# Patient Record
Sex: FEMALE | Race: WHITE | Employment: OTHER | URBAN - METROPOLITAN AREA
[De-identification: names, ages, dates, MRNs, and addresses within clinical notes are randomized per-mention and may not be internally consistent; named-entity substitution may affect disease eponyms.]

---

## 2018-10-14 ASSESSMENT — MIFFLIN-ST. JEOR
SCORE: 1066.95
SCORE: 1094.63

## 2018-10-15 ASSESSMENT — MIFFLIN-ST. JEOR: SCORE: 1114.63

## 2018-10-16 ASSESSMENT — MIFFLIN-ST. JEOR: SCORE: 1106.63

## 2018-10-17 ASSESSMENT — MIFFLIN-ST. JEOR: SCORE: 1136.63

## 2018-10-18 ENCOUNTER — ANESTHESIA - HEALTHEAST (OUTPATIENT)
Dept: SURGERY | Facility: CLINIC | Age: 73
End: 2018-10-18

## 2018-10-18 ASSESSMENT — MIFFLIN-ST. JEOR: SCORE: 1138.63

## 2018-10-19 ENCOUNTER — SURGERY - HEALTHEAST (OUTPATIENT)
Dept: SURGERY | Facility: CLINIC | Age: 73
End: 2018-10-19

## 2018-10-19 ASSESSMENT — MIFFLIN-ST. JEOR: SCORE: 1121.38

## 2018-10-20 ASSESSMENT — MIFFLIN-ST. JEOR: SCORE: 1128.18

## 2018-10-22 ENCOUNTER — COMMUNICATION - HEALTHEAST (OUTPATIENT)
Dept: NEUROSURGERY | Facility: CLINIC | Age: 73
End: 2018-10-22

## 2018-10-22 DIAGNOSIS — I61.9 HEMORRHAGIC STROKE (H): ICD-10-CM

## 2018-10-29 ENCOUNTER — COMMUNICATION - HEALTHEAST (OUTPATIENT)
Dept: NEUROSURGERY | Facility: CLINIC | Age: 73
End: 2018-10-29

## 2018-10-31 ENCOUNTER — OFFICE VISIT - HEALTHEAST (OUTPATIENT)
Dept: NEUROSURGERY | Facility: CLINIC | Age: 73
End: 2018-10-31

## 2018-10-31 ENCOUNTER — RECORDS - HEALTHEAST (OUTPATIENT)
Dept: LAB | Facility: CLINIC | Age: 73
End: 2018-10-31

## 2018-10-31 DIAGNOSIS — I62.9 INTRACRANIAL BLEED (H): ICD-10-CM

## 2018-10-31 LAB
ALBUMIN SERPL-MCNC: 2.8 G/DL (ref 3.5–5)
ALP SERPL-CCNC: 94 U/L (ref 45–120)
ALT SERPL W P-5'-P-CCNC: <9 U/L (ref 0–45)
ANION GAP SERPL CALCULATED.3IONS-SCNC: 12 MMOL/L (ref 5–18)
AST SERPL W P-5'-P-CCNC: 17 U/L (ref 0–40)
BILIRUB SERPL-MCNC: 0.5 MG/DL (ref 0–1)
BUN SERPL-MCNC: 9 MG/DL (ref 8–28)
CALCIUM SERPL-MCNC: 9.1 MG/DL (ref 8.5–10.5)
CHLORIDE BLD-SCNC: 102 MMOL/L (ref 98–107)
CO2 SERPL-SCNC: 24 MMOL/L (ref 22–31)
CREAT SERPL-MCNC: 0.67 MG/DL (ref 0.6–1.1)
DIGOXIN LEVEL LHE- HISTORICAL: 0.8 NG/ML (ref 0.5–2)
ERYTHROCYTE [DISTWIDTH] IN BLOOD BY AUTOMATED COUNT: 13.1 % (ref 11–14.5)
GFR SERPL CREATININE-BSD FRML MDRD: >60 ML/MIN/1.73M2
GLUCOSE BLD-MCNC: 89 MG/DL (ref 70–125)
HCT VFR BLD AUTO: 40 % (ref 35–47)
HGB BLD-MCNC: 12.8 G/DL (ref 12–16)
LEVETIRACETAM (KEPPRA): 24.2 UG/ML (ref 6–46)
MCH RBC QN AUTO: 32.1 PG (ref 27–34)
MCHC RBC AUTO-ENTMCNC: 32 G/DL (ref 32–36)
MCV RBC AUTO: 100 FL (ref 80–100)
PLATELET # BLD AUTO: 362 THOU/UL (ref 140–440)
PMV BLD AUTO: 9.7 FL (ref 8.5–12.5)
POTASSIUM BLD-SCNC: 4.1 MMOL/L (ref 3.5–5)
PROT SERPL-MCNC: 5.9 G/DL (ref 6–8)
RBC # BLD AUTO: 3.99 MILL/UL (ref 3.8–5.4)
SODIUM SERPL-SCNC: 138 MMOL/L (ref 136–145)
WBC: 9.1 THOU/UL (ref 4–11)

## 2018-10-31 ASSESSMENT — MIFFLIN-ST. JEOR: SCORE: 1121.38
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
## 2018-11-14 PROBLEM — S62.101A WRIST FRACTURE, RIGHT, CLOSED, INITIAL ENCOUNTER: Status: ACTIVE | Noted: 2018-10-16

## 2018-11-14 PROBLEM — I48.91 ATRIAL FIBRILLATION WITH RAPID VENTRICULAR RESPONSE (H): Status: ACTIVE | Noted: 2018-10-15

## 2018-11-14 PROBLEM — R41.4 HEMI-NEGLECT OF RIGHT SIDE: Status: ACTIVE | Noted: 2018-10-16

## 2018-11-14 PROBLEM — T79.6XXA TRAUMATIC RHABDOMYOLYSIS, INITIAL ENCOUNTER (H): Status: ACTIVE | Noted: 2018-10-15

## 2018-11-14 PROBLEM — L03.114 CELLULITIS OF LEFT FOREARM: Status: ACTIVE | Noted: 2018-10-18

## 2018-11-14 PROBLEM — R41.82 ALTERED MENTAL STATUS, UNSPECIFIED ALTERED MENTAL STATUS TYPE: Status: ACTIVE | Noted: 2018-10-16

## 2018-11-14 PROBLEM — S52.501A FRACTURE OF RIGHT DISTAL RADIUS: Status: ACTIVE | Noted: 2018-10-14

## 2018-11-14 PROBLEM — G93.5 COMPRESSION OF BRAIN (H): Status: ACTIVE | Noted: 2018-10-15

## 2018-11-20 ENCOUNTER — NURSING HOME VISIT (OUTPATIENT)
Dept: GERIATRICS | Facility: CLINIC | Age: 73
End: 2018-11-20
Payer: MEDICARE

## 2018-11-20 VITALS
DIASTOLIC BLOOD PRESSURE: 83 MMHG | SYSTOLIC BLOOD PRESSURE: 126 MMHG | OXYGEN SATURATION: 97 % | WEIGHT: 117 LBS | RESPIRATION RATE: 18 BRPM | HEART RATE: 68 BPM | TEMPERATURE: 97.2 F

## 2018-11-20 DIAGNOSIS — I63.422 CEREBROVASCULAR ACCIDENT (CVA) DUE TO EMBOLISM OF LEFT ANTERIOR CEREBRAL ARTERY (H): ICD-10-CM

## 2018-11-20 DIAGNOSIS — R29.6 FREQUENT FALLS: ICD-10-CM

## 2018-11-20 DIAGNOSIS — I62.9 INTRACRANIAL BLEED (H): Primary | ICD-10-CM

## 2018-11-20 DIAGNOSIS — I48.91 ATRIAL FIBRILLATION WITH RAPID VENTRICULAR RESPONSE (H): ICD-10-CM

## 2018-11-20 DIAGNOSIS — I65.23 CAROTID STENOSIS, BILATERAL: ICD-10-CM

## 2018-11-20 DIAGNOSIS — R41.82 ALTERED MENTAL STATUS, UNSPECIFIED ALTERED MENTAL STATUS TYPE: ICD-10-CM

## 2018-11-20 DIAGNOSIS — S62.101A WRIST FRACTURE, RIGHT, CLOSED, INITIAL ENCOUNTER: ICD-10-CM

## 2018-11-20 DIAGNOSIS — R41.89 COGNITIVE IMPAIRMENT: ICD-10-CM

## 2018-11-20 DIAGNOSIS — K92.2 GASTROINTESTINAL HEMORRHAGE, UNSPECIFIED GASTROINTESTINAL HEMORRHAGE TYPE: ICD-10-CM

## 2018-11-20 DIAGNOSIS — R41.4 HEMI-NEGLECT OF RIGHT SIDE: ICD-10-CM

## 2018-11-20 DIAGNOSIS — S52.501D CLOSED FRACTURE OF DISTAL END OF RIGHT RADIUS WITH ROUTINE HEALING, UNSPECIFIED FRACTURE MORPHOLOGY, SUBSEQUENT ENCOUNTER: ICD-10-CM

## 2018-11-20 DIAGNOSIS — R33.9 URINARY RETENTION WITH INCOMPLETE BLADDER EMPTYING: ICD-10-CM

## 2018-11-20 PROCEDURE — 99310 SBSQ NF CARE HIGH MDM 45: CPT | Performed by: NURSE PRACTITIONER

## 2018-11-20 RX ORDER — HYDROCODONE BITARTRATE AND ACETAMINOPHEN 7.5; 325 MG/1; MG/1
1-2 TABLET ORAL EVERY 4 HOURS PRN
COMMUNITY
End: 2019-01-02

## 2018-11-20 RX ORDER — HYDROCODONE BITARTRATE AND ACETAMINOPHEN 5; 325 MG/1; MG/1
1-2 TABLET ORAL EVERY 4 HOURS PRN
COMMUNITY
End: 2018-11-20

## 2018-11-20 NOTE — LETTER
11/20/2018        RE: Citlalli Villarreal  [address]  [facility] [address]        [facility]  PRIMARY CARE PROVIDER AND CLINIC:  No primary care provider on file. No primary physician on file.  Chief Complaint   Patient presents with     Hospital F/U     [facility] Medical Record Number:  [id number]  Place of Service where encounter took place:  [facility]  ([facility]) [[id number]]    HPI:    Citlalli Villarreal is a 72 year old  (1945),admitted to the above facility from  [facility].  Hospital stay 10/14/18 through 10/20/18, then [facility] TCU 10/20/18-10/26/18, and [facility] ED 10/26/18, then back to [facility] until her move to [facility] on 11/14/18.  Admitted to this facility for  rehab, medical management and nursing care.  HPI information obtained from: facility chart records, facility staff, patient report and [facility] chart review.      Citlalli Villarreal is a 73 yo patient who had not received medical care for several years, of unknown length.  She has been living in her own home, has 2 cats and 5 birds. She had been estranged from her spouse for many years (over 20 years?) and her children for over 15 years. Her significant other, who lived with her, was moved to a senior living option several years ago and she had been alone. Her home is reported to be in significant disrepair, dirty, and with cat feces and bird droppings throughout the home.  There is apparently an open vulnerable adult case regarding her situation.    She was admitted to the hospital on 10/14/18 after she did not answer her phone for at least 2 days, and was found down on the floor in her home, reporting that she hit her head on the table and the floor-trying to get up but was unable to do so. A R wrist deformity was noted, several bruises and low body temperature. She was found to have Afib with RVR, rhabdomyolysis, intracranial head bleed, + FOBT.    She was  found to have subacute large L occipital intraparenchymal hemorrhage with suspected ischemic infarct, possibly embolic in the setting of Afib. Also found to have severe high-grade narrowing of intracranial vertebral and basilar arteries, L PCA, L MCA, R MCA as well as L ICA stenosis 70-80%, R ICA stenosis 60-70%. She had R hemineglect, worsening dysarthria.  She was stabilized and transitioned to a TCU for ongoing care. At that TCU, she was found unresponsive on the floor and was seen at [facility] ED.    There, she was found to have urinary retention and CT scans of neck, head unremarkable for new issues, so she was returned to that TCU while awaiting placement at another facility.     Current issues are:         Intracranial bleed (H)  Cerebrovascular accident (CVA) due to embolism of left anterior cerebral artery (H)  Altered mental status, unspecified altered mental status type  Deny-neglect of right side  Atrial fibrillation with rapid ventricular response (H)  Carotid stenosis, bilateral  Frequent falls  Wrist fracture, right, closed, initial encounter  Closed fracture of distal end of right radius with routine healing, unspecified fracture morphology, subsequent encounter  Urinary retention with incomplete bladder emptying  Gastrointestinal hemorrhage, unspecified gastrointestinal hemorrhage type     Today, patient reports pain in the R chest wall without dyspnea, palpitations, or vital signs changes. The pain is un-reproducible and she reports it has been there 2-3 weeks.  She also reports significant feelings of dizziness leading to feeling as if she is going to fall.     CODE STATUS/ADVANCE DIRECTIVES DISCUSSION:   CPR/Full code   Patient's living condition: lives alone    ALLERGIES:Review of patient's allergies indicates no known allergies.  PAST MEDICAL HISTORY:  has a past medical history of Cerebral infarction (H) and Right wrist fracture (10/2018). She also has no past medical history of Thyroid  disease.  PAST SURGICAL HISTORY:  has no past surgical history on file.  FAMILY HISTORY: Family history is unknown by patient.  SOCIAL HISTORY:  unknown if smoking or alcohol use.     Post Discharge Medication Reconciliation Status: discharge medications reconciled and changed, per note/orders (see AVS).  Current Outpatient Prescriptions   Medication Sig Dispense Refill     ACETAMINOPHEN PO Take 650 mg by mouth 3 times daily And give 650 mg PO TID PRN for pain 1-3 out of 10       AMIODARONE HCL PO Take 400 mg by mouth daily Until 11/22 then decrease to 200 mg PO every day       ASPIRIN PO Take 81 mg by mouth daily       ATORVASTATIN CALCIUM PO Take 20 mg by mouth daily       DIGOXIN PO Take 125 mcg by mouth daily       DILTIAZEM HCL PO Take 240 mg by mouth daily       GABAPENTIN PO Take 100 mg by mouth At Bedtime       HYDROcodone-acetaminophen (NORCO) 7.5-325 MG per tablet Take 1-2 tablets by mouth every 4 hours as needed for severe pain Give 1 tab for pain 4-7 out of 10 and 2 tab for pain 8-10 out of 10       Multiple Vitamins-Minerals (MULTIVITAL-M PO) Take 1 tablet by mouth daily       OMEPRAZOLE PO Take 20 mg by mouth daily         ROS:  Limited secondary to cognitive impairment but today pt reports pain in chest wall, dizziness, weakness, difficulty sleeping.     Exam:  /83  Pulse 68  Temp 97.2  F (36.2  C)  Resp 18  Wt 117 lb (53.1 kg)  SpO2 97%  GENERAL APPEARANCE:  Alert, thin, appears ill, anxious  ENT:  Mouth and posterior oropharynx normal, moist mucous membranes  EYES:  EOM, conjunctivae, lids, pupils and irises normal  RESP:  respiratory effort and palpation of chest normal, lungs clear to auscultation , no respiratory distress  CV:  Palpation and auscultation of heart done , no edema, irregular rhythm irregularly irregular, grade systolic 2/6 murmur, No JVD, chest wall tender to palpation R mid chest at sternum  ABDOMEN:  normal bowel sounds, soft, nontender, no hepatosplenomegaly or other  masses  M/S:   Gait and station abnormal walking only with staff assist, transfers, high falls risk  SKIN:  Inspection of skin and subcutaneous tissue reveals no open areas, per nursing report  PSYCH:  oriented to month, town of [place], 4 children, vague recall of events    Lab/Diagnostic data:             ASSESSMENT/PLAN:  Intracranial bleed (H)  Cerebrovascular accident (CVA) due to embolism of left anterior cerebral artery (H)  Altered mental status, unspecified altered mental status type  Deny-neglect of right side  Found down on the floor after 2 days, with history of several falls.  Found to have suibactue L PCA infarct with hemorrhagic conversion, diffuse intracranial atherosclerosis.  Also has severe high-grade atherosclerotic narrowing of intracranial vertebral and basilar arteries, L PCA, L MCA, R MCA.  She completed a course of Keppra-EEG nonspecific, and is following with Dr. White, neurology at [facility].  Plan is for repeat CT on 10/21/18 and if resolution of hemorrhage is occurring to start aspirin (this was done).  Needs repeat CT head about 11/21/18 (scheduled for 11/28/18) and if compete resolution should start on Plavix.  However, if cardiology recommends anticoagulation (Eliquis or other), then discontinue aspirin and Plavix.   -neurology appointment with follow up CT next week  -if within normal limits, consider addition of Plavix  -recommend (per son Jose Armando's request) referral for ongoing follow up with neurology at [facility] - son will request transfer of care from Dr. White to [facility] at this follow up appointment.   -therapy to determine cognitive status.  -son, Jose Armando, working on guardianship paperwork    Atrial fibrillation with rapid ventricular response (H)  Found down with afib with RVR, on amiodarone taper-to 200 mg daily starting 11/23/18, digoxin 125 daily, and diltiazem 240 daily as well as aspirin, statin.  She has been lost to medical care for years. Heart rate still  irregularly irregular today with a faint murmur heard as well.  Echo 10/15/18 at [facility] shows normal LV size and function with EF 57%, moderate concentric hypertrophy noted, RV abnormal systolic function, mild aortic stenosis, pericardial effusion. Cardiology during hospital stay recommends Holter Monitor to determine extent of cardiac issues and next steps. If anticoagulation is recommended, then would re-evaluate need for aspirin and plavix for CVA.   -spoke with son, Jose Armando.  He would like all services moved to [place] due to it's proximity to [facility] and for ease of transport.    -CARDIOLOGY REFERRAL  -continue diltiazem, digoxin, amiodarone, and aspirin now  -check labs, including dig level  -orthostatic BP/P due to dizziness  -Full Code      Carotid stenosis, bilateral  Noted to have ICA stenosis L>R, 70-80%.  Will likely need vascular as well, may need evaluation for CEA.   -consider vascular referral    Frequent falls  Weak, dizziness likely contributing factor as well as R hemineglect.    -therapy   -close monitoring to prevent falls    Wrist fracture, right, closed, initial encounter  Closed fracture of distal end of right radius with routine healing, unspecified fracture morphology, subsequent encounter  Following with ortho, and reported healing is adequate. Has follow up with [facility] in the next 1-2 weeks, to determine next steps. Son would like to transition care to [facility] in [place], but needs to finish this course with [facility].   -son to transport for this follow up appointment.      Urinary retention with incomplete bladder emptying  Noted  To have urinary retention, previously had macedo cath.  No PVR while at [facility], but will continue to monitor.     Gastrointestinal hemorrhage, unspecified gastrointestinal hemorrhage type  Noted to have + FOBT while acutely ill, needs follow up with either repeat FOBT or colonoscopy as outpatient.   -check hemoglobin  -remain on PPI for now.     She  is having some R sided chest wall pain, points specifically to R medial sternal area 4th or 5th intercostal space and is somewhat tender to touch.  This is not correlated with increased dyspnea, palpitations, or vital signs changes. Relieved with Norco. May be related to muscle strain/bruising during frequent falls, does not seem to be cardiac in nature but this cannot be ruled out in this setting.   -schedule tylenol, monitor.     Cognitive  Impairment  OT and ST involved for cognitive testing, will also have in-house psych evaluation.  Family is seeking guardianship due to vulnerability of patient and lack of medical care over last many years, so will need documentation to show cognitive impairment.  -OT, ST to complete cognitive testing    Immunizations  Reviewed in [facility].  See orders. Received influenza shot 10/2/18 per [facility].        Orders:  1. Orthostatic BP and Pulse x3 at varying times over next 7 days and Record (lying, Sitting, Standing) Dx: Dizzyness  2. Ongoing PVR every shift. PVR every shift and Straight cath if >350mL Dx: Urinary Retention  3. discontinue tylenol 1000 mg Q4 PRN  4. Tylenol 650 mg TID and 650 mg PO  TID PRN Dx: Pain of 1-3 out of 10  5. Max daily dose of tylenol 4000 mg daily   6. Clarify Norco 7.5/325 1-2 tab for pain 4-7 out of 10 and 2 tabs for pain 8-10 out of 10   7. Ok for in house psych to evaluate and teat D: Depression  8. Please call cardiology to schedule consult - Tyrell lainez ([phone])  9. Administer T-dap Immunization x1 does Dx: Health Maintenance  10. Administer Prevnar 13 Vaccine IM x1 dose Dx: Health Maintenance   11. Lab Draw 11/26/18 Dx: Comprehensive Metabolic Panel, CBC, and digoxin Dx: HTN, anemia, A-fib      Total time spent with patient visit at the skilled nursing facility was 75 minutes including patient visit, review of past records and phone call to patient contact. Greater than 50% of total time spent with counseling and coordinating care  due to complex medical comorbid conditions   Time in:  9:15 am  Time out:  10:30 am     Electronically signed by:  JADA Us CNP      Sincerely,        JADA Us CNP

## 2018-11-20 NOTE — PROGRESS NOTES
[place] [facility]  PRIMARY CARE PROVIDER AND CLINIC:  No primary care provider on file. No primary physician on file.  Chief Complaint   Patient presents with     Hospital F/U     [place] Medical Record Number:  [id number]  Place of Service where encounter took place:  [facility]  ([facility]) [[id number]]    HPI:    Citlalli Villarreal is a 72 year old  (1945),admitted to the above facility from  [facility].  Hospital stay 10/14/18 through 10/20/18, then [facility] TCU 10/20/18-10/26/18, and [facility] ED 10/26/18, then back to [facility] until her move to [facility] on 11/14/18.  Admitted to this facility for  rehab, medical management and nursing care.  HPI information obtained from: facility chart records, facility staff, patient report and [facility] chart review.      Citlalli Villarreal is a 71 yo patient who had not received medical care for several years, of unknown length.  She has been living in her own home, has 2 cats and 5 birds. She had been estranged from her spouse for many years (over 20 years?) and her children for over 15 years. Her significant other, who lived with her, was moved to a senior living option several years ago and she had been alone. Her home is reported to be in significant disrepair, dirty, and with cat feces and bird droppings throughout the home.  There is apparently an open vulnerable adult case regarding her situation.    She was admitted to the hospital on 10/14/18 after she did not answer her phone for at least 2 days, and was found down on the floor in her home, reporting that she hit her head on the table and the floor-trying to get up but was unable to do so. A R wrist deformity was noted, several bruises and low body temperature. She was found to have Afib with RVR, rhabdomyolysis, intracranial head bleed, + FOBT.    She was found to have subacute large L occipital intraparenchymal hemorrhage with suspected ischemic  infarct, possibly embolic in the setting of Afib. Also found to have severe high-grade narrowing of intracranial vertebral and basilar arteries, L PCA, L MCA, R MCA as well as L ICA stenosis 70-80%, R ICA stenosis 60-70%. She had R hemineglect, worsening dysarthria.  She was stabilized and transitioned to a TCU for ongoing care. At that TCU, she was found unresponsive on the floor and was seen at [facility] ED.    There, she was found to have urinary retention and CT scans of neck, head unremarkable for new issues, so she was returned to that TCU while awaiting placement at another facility.     Current issues are:         Intracranial bleed (H)  Cerebrovascular accident (CVA) due to embolism of left anterior cerebral artery (H)  Altered mental status, unspecified altered mental status type  Deny-neglect of right side  Atrial fibrillation with rapid ventricular response (H)  Carotid stenosis, bilateral  Frequent falls  Wrist fracture, right, closed, initial encounter  Closed fracture of distal end of right radius with routine healing, unspecified fracture morphology, subsequent encounter  Urinary retention with incomplete bladder emptying  Gastrointestinal hemorrhage, unspecified gastrointestinal hemorrhage type     Today, patient reports pain in the R chest wall without dyspnea, palpitations, or vital signs changes. The pain is un-reproducible and she reports it has been there 2-3 weeks.  She also reports significant feelings of dizziness leading to feeling as if she is going to fall.     CODE STATUS/ADVANCE DIRECTIVES DISCUSSION:   CPR/Full code   Patient's living condition: lives alone    ALLERGIES:Review of patient's allergies indicates no known allergies.  PAST MEDICAL HISTORY:  has a past medical history of Cerebral infarction (H) and Right wrist fracture (10/2018). She also has no past medical history of Thyroid disease.  PAST SURGICAL HISTORY:  has no past surgical history on file.  FAMILY HISTORY: Family  history is unknown by patient.  SOCIAL HISTORY:  unknown if smoking or alcohol use.     Post Discharge Medication Reconciliation Status: discharge medications reconciled and changed, per note/orders (see AVS).  Current Outpatient Prescriptions   Medication Sig Dispense Refill     ACETAMINOPHEN PO Take 650 mg by mouth 3 times daily And give 650 mg PO TID PRN for pain 1-3 out of 10       AMIODARONE HCL PO Take 400 mg by mouth daily Until 11/22 then decrease to 200 mg PO every day       ASPIRIN PO Take 81 mg by mouth daily       ATORVASTATIN CALCIUM PO Take 20 mg by mouth daily       DIGOXIN PO Take 125 mcg by mouth daily       DILTIAZEM HCL PO Take 240 mg by mouth daily       GABAPENTIN PO Take 100 mg by mouth At Bedtime       HYDROcodone-acetaminophen (NORCO) 7.5-325 MG per tablet Take 1-2 tablets by mouth every 4 hours as needed for severe pain Give 1 tab for pain 4-7 out of 10 and 2 tab for pain 8-10 out of 10       Multiple Vitamins-Minerals (MULTIVITAL-M PO) Take 1 tablet by mouth daily       OMEPRAZOLE PO Take 20 mg by mouth daily         ROS:  Limited secondary to cognitive impairment but today pt reports pain in chest wall, dizziness, weakness, difficulty sleeping.     Exam:  /83  Pulse 68  Temp 97.2  F (36.2  C)  Resp 18  Wt 117 lb (53.1 kg)  SpO2 97%  GENERAL APPEARANCE:  Alert, thin, appears ill, anxious  ENT:  Mouth and posterior oropharynx normal, moist mucous membranes  EYES:  EOM, conjunctivae, lids, pupils and irises normal  RESP:  respiratory effort and palpation of chest normal, lungs clear to auscultation , no respiratory distress  CV:  Palpation and auscultation of heart done , no edema, irregular rhythm irregularly irregular, grade systolic 2/6 murmur, No JVD, chest wall tender to palpation R mid chest at sternum  ABDOMEN:  normal bowel sounds, soft, nontender, no hepatosplenomegaly or other masses  M/S:   Gait and station abnormal walking only with staff assist, transfers, high falls  risk  SKIN:  Inspection of skin and subcutaneous tissue reveals no open areas, per nursing report  PSYCH:  oriented to month, town of [place], 4 children, vague recall of events    Lab/Diagnostic data:             ASSESSMENT/PLAN:  Intracranial bleed (H)  Cerebrovascular accident (CVA) due to embolism of left anterior cerebral artery (H)  Altered mental status, unspecified altered mental status type  Deny-neglect of right side  Found down on the floor after 2 days, with history of several falls.  Found to have suibactue L PCA infarct with hemorrhagic conversion, diffuse intracranial atherosclerosis.  Also has severe high-grade atherosclerotic narrowing of intracranial vertebral and basilar arteries, L PCA, L MCA, R MCA.  She completed a course of Keppra-EEG nonspecific, and is following with Dr. White, neurology at [facility].  Plan is for repeat CT on 10/21/18 and if resolution of hemorrhage is occurring to start aspirin (this was done).  Needs repeat CT head about 11/21/18 (scheduled for 11/28/18) and if compete resolution should start on Plavix.  However, if cardiology recommends anticoagulation (Eliquis or other), then discontinue aspirin and Plavix.   -neurology appointment with follow up CT next week  -if within normal limits, consider addition of Plavix  -recommend (per son Jose Armando's request) referral for ongoing follow up with neurology at [facility] - son will request transfer of care from Dr. White to [facility] at this follow up appointment.   -therapy to determine cognitive status.  -son, Jose Armando, working on guardianship paperwork    Atrial fibrillation with rapid ventricular response (H)  Found down with afib with RVR, on amiodarone taper-to 200 mg daily starting 11/23/18, digoxin 125 daily, and diltiazem 240 daily as well as aspirin, statin.  She has been lost to medical care for years. Heart rate still irregularly irregular today with a faint murmur heard as well.  Echo 10/15/18 at [facility]  shows normal LV size and function with EF 57%, moderate concentric hypertrophy noted, RV abnormal systolic function, mild aortic stenosis, pericardial effusion. Cardiology during hospital stay recommends Holter Monitor to determine extent of cardiac issues and next steps. If anticoagulation is recommended, then would re-evaluate need for aspirin and plavix for CVA.   -spoke with son, Jose Armando.  He would like all services moved to [place] due to it's proximity to [facility] and for ease of transport.    -CARDIOLOGY REFERRAL  -continue diltiazem, digoxin, amiodarone, and aspirin now  -check labs, including dig level  -orthostatic BP/P due to dizziness  -Full Code      Carotid stenosis, bilateral  Noted to have ICA stenosis L>R, 70-80%.  Will likely need vascular as well, may need evaluation for CEA.   -consider vascular referral    Frequent falls  Weak, dizziness likely contributing factor as well as R hemineglect.    -therapy   -close monitoring to prevent falls    Wrist fracture, right, closed, initial encounter  Closed fracture of distal end of right radius with routine healing, unspecified fracture morphology, subsequent encounter  Following with ortho, and reported healing is adequate. Has follow up with [facility] in the next 1-2 weeks, to determine next steps. Son would like to transition care to [facility] in [place], but needs to finish this course with [facility].   -son to transport for this follow up appointment.      Urinary retention with incomplete bladder emptying  Noted  To have urinary retention, previously had macedo cath.  No PVR while at [place], but will continue to monitor.     Gastrointestinal hemorrhage, unspecified gastrointestinal hemorrhage type  Noted to have + FOBT while acutely ill, needs follow up with either repeat FOBT or colonoscopy as outpatient.   -check hemoglobin  -remain on PPI for now.     She is having some R sided chest wall pain, points specifically to R medial sternal area 4th or  5th intercostal space and is somewhat tender to touch.  This is not correlated with increased dyspnea, palpitations, or vital signs changes. Relieved with Norco. May be related to muscle strain/bruising during frequent falls, does not seem to be cardiac in nature but this cannot be ruled out in this setting.   -schedule tylenol, monitor.     Cognitive  Impairment  OT and ST involved for cognitive testing, will also have in-house psych evaluation.  Family is seeking guardianship due to vulnerability of patient and lack of medical care over last many years, so will need documentation to show cognitive impairment.  -OT, ST to complete cognitive testing    Immunizations  Reviewed in [facility], [facility].  See orders. Received influenza shot 10/2/18 per [facility].        Orders:  1. Orthostatic BP and Pulse x3 at varying times over next 7 days and Record (lying, Sitting, Standing) Dx: Dizzyness  2. Ongoing PVR every shift. PVR every shift and Straight cath if >350mL Dx: Urinary Retention  3. discontinue tylenol 1000 mg Q4 PRN  4. Tylenol 650 mg TID and 650 mg PO  TID PRN Dx: Pain of 1-3 out of 10  5. Max daily dose of tylenol 4000 mg daily   6. Clarify Norco 7.5/325 1-2 tab for pain 4-7 out of 10 and 2 tabs for pain 8-10 out of 10   7. Ok for in house psych to evaluate and teat D: Depression  8. Please call cardiology to schedule consult - Tyrell lainez ([phone])  9. Administer T-dap Immunization x1 does Dx: Health Maintenance  10. Administer Prevnar 13 Vaccine IM x1 dose Dx: Health Maintenance   11. Lab Draw 11/26/18 Dx: Comprehensive Metabolic Panel, CBC, and digoxin Dx: HTN, anemia, A-fib      Total time spent with patient visit at the skilled nursing facility was 75 minutes including patient visit, review of past records and phone call to patient contact. Greater than 50% of total time spent with counseling and coordinating care due to complex medical comorbid conditions   Time in:  9:15 am  Time out:  10:30  am     Electronically signed by:  JADA Us CNP

## 2018-11-21 PROBLEM — K92.2 GASTROINTESTINAL HEMORRHAGE, UNSPECIFIED GASTROINTESTINAL HEMORRHAGE TYPE: Status: ACTIVE | Noted: 2018-11-21

## 2018-11-26 ENCOUNTER — TRANSFERRED RECORDS (OUTPATIENT)
Dept: HEALTH INFORMATION MANAGEMENT | Facility: CLINIC | Age: 73
End: 2018-11-26

## 2018-11-26 ENCOUNTER — RECORDS - HEALTHEAST (OUTPATIENT)
Dept: LAB | Facility: CLINIC | Age: 73
End: 2018-11-26

## 2018-11-26 LAB
ALBUMIN SERPL-MCNC: 3 G/DL (ref 3.5–5)
ALBUMIN SERPL-MCNC: 3 G/DL (ref 3.5–5)
ALP SERPL-CCNC: 105 U/L (ref 45–120)
ALP SERPL-CCNC: 105 U/L (ref 45–120)
ALT SERPL W P-5'-P-CCNC: 9 U/L (ref 0–45)
ALT SERPL-CCNC: 9 U/L (ref 0–45)
ANION GAP SERPL CALCULATED.3IONS-SCNC: 7 MMOL/L (ref 5–18)
ANION GAP SERPL CALCULATED.3IONS-SCNC: 7 MMOL/L (ref 5–18)
AST SERPL W P-5'-P-CCNC: 13 U/L (ref 0–40)
AST SERPL-CCNC: 13 U/L (ref 0–40)
BILIRUB SERPL-MCNC: 0.3 MG/DL (ref 0–1)
BILIRUB SERPL-MCNC: 0.3 MG/DL (ref 0–1)
BUN SERPL-MCNC: 16 MG/DL (ref 8–28)
BUN SERPL-MCNC: 16 MG/DL (ref 8–28)
CALCIUM SERPL-MCNC: 9.3 MG/DL (ref 8.5–10.5)
CALCIUM SERPL-MCNC: 9.3 MG/DL (ref 8.5–10.5)
CHLORIDE BLD-SCNC: 105 MMOL/L (ref 98–107)
CHLORIDE SERPLBLD-SCNC: 105 MMOL/L (ref 98–107)
CO2 SERPL-SCNC: 29 MMOL/L (ref 22–31)
CO2 SERPL-SCNC: 29 MMOL/L (ref 22–31)
CREAT SERPL-MCNC: 0.83 MG/DL (ref 0.6–1.1)
CREAT SERPL-MCNC: 0.83 MG/DL (ref 0.6–1.1)
DIGOXIN LEVEL LHE- HISTORICAL: 1 NG/ML (ref 0.5–2)
ERYTHROCYTE [DISTWIDTH] IN BLOOD BY AUTOMATED COUNT: 13.7 % (ref 11–14.5)
ERYTHROCYTE [DISTWIDTH] IN BLOOD BY AUTOMATED COUNT: 13.7 % (ref 11–14.5)
GFR SERPL CREATININE-BSD FRML MDRD: >60 ML/MIN/1.73M2
GFR SERPL CREATININE-BSD FRML MDRD: >60 ML/MIN/1.73M2
GLUCOSE BLD-MCNC: 96 MG/DL (ref 70–125)
GLUCOSE SERPL-MCNC: 98 MG/DL (ref 70–125)
HCT VFR BLD AUTO: 42.9 % (ref 35–47)
HCT VFR BLD AUTO: 42.9 % (ref 35–47)
HEMOGLOBIN: 13.4 G/DL (ref 12–16)
HGB BLD-MCNC: 13.4 G/DL (ref 12–16)
MCH RBC QN AUTO: 31.2 PG (ref 27–34)
MCH RBC QN AUTO: 31.2 PG (ref 27–34)
MCHC RBC AUTO-ENTMCNC: 31.2 G/DL (ref 32–36)
MCHC RBC AUTO-ENTMCNC: 31.2 G/DL (ref 32–36)
MCV RBC AUTO: 100 FL (ref 80–100)
MCV RBC AUTO: 100 FL (ref 80–100)
PLATELET # BLD AUTO: 293 THOU/UL (ref 140–440)
PLATELET # BLD AUTO: 293 THOU/UL (ref 140–440)
PMV BLD AUTO: 10.1 FL (ref 8.5–12.5)
POTASSIUM BLD-SCNC: 4.8 MMOL/L (ref 3.5–5)
POTASSIUM SERPL-SCNC: 4.8 MMOL/L (ref 3.5–5)
PROT SERPL-MCNC: 6.3 G/DL (ref 6–8)
PROT SERPL-MCNC: 6.3 G/DL (ref 6–8)
RBC # BLD AUTO: 4.29 MILL/UL (ref 3.8–5.4)
RBC # BLD AUTO: 4.29 MILL/UL (ref 3.8–5.4)
SODIUM SERPL-SCNC: 141 MMOL/L (ref 136–145)
SODIUM SERPL-SCNC: 141 MMOL/L (ref 136–145)
WBC # BLD AUTO: 9.7 THOU/UL (ref 4–11)
WBC: 9.7 THOU/UL (ref 4–11)

## 2018-11-27 ENCOUNTER — COMMUNICATION - HEALTHEAST (OUTPATIENT)
Dept: NEUROSURGERY | Facility: CLINIC | Age: 73
End: 2018-11-27

## 2018-11-28 ENCOUNTER — NURSING HOME VISIT (OUTPATIENT)
Dept: GERIATRICS | Facility: CLINIC | Age: 73
End: 2018-11-28
Payer: MEDICARE

## 2018-11-28 VITALS
WEIGHT: 117 LBS | RESPIRATION RATE: 18 BRPM | DIASTOLIC BLOOD PRESSURE: 75 MMHG | SYSTOLIC BLOOD PRESSURE: 123 MMHG | TEMPERATURE: 98.3 F | OXYGEN SATURATION: 97 % | HEART RATE: 61 BPM

## 2018-11-28 DIAGNOSIS — S52.501D CLOSED FRACTURE OF DISTAL END OF RIGHT RADIUS WITH ROUTINE HEALING, UNSPECIFIED FRACTURE MORPHOLOGY, SUBSEQUENT ENCOUNTER: ICD-10-CM

## 2018-11-28 DIAGNOSIS — I63.422 CEREBROVASCULAR ACCIDENT (CVA) DUE TO EMBOLISM OF LEFT ANTERIOR CEREBRAL ARTERY (H): Primary | ICD-10-CM

## 2018-11-28 DIAGNOSIS — K92.2 GASTROINTESTINAL HEMORRHAGE, UNSPECIFIED GASTROINTESTINAL HEMORRHAGE TYPE: ICD-10-CM

## 2018-11-28 DIAGNOSIS — I48.91 ATRIAL FIBRILLATION WITH RAPID VENTRICULAR RESPONSE (H): ICD-10-CM

## 2018-11-28 DIAGNOSIS — R41.4 HEMI-NEGLECT OF RIGHT SIDE: ICD-10-CM

## 2018-11-28 DIAGNOSIS — I62.9 INTRACRANIAL BLEED (H): ICD-10-CM

## 2018-11-28 DIAGNOSIS — R41.89 COGNITIVE IMPAIRMENT: ICD-10-CM

## 2018-11-28 DIAGNOSIS — R33.9 URINARY RETENTION WITH INCOMPLETE BLADDER EMPTYING: ICD-10-CM

## 2018-11-28 PROCEDURE — 99309 SBSQ NF CARE MODERATE MDM 30: CPT | Performed by: NURSE PRACTITIONER

## 2018-11-28 NOTE — LETTER
11/28/2018        RE: Citlalli Villarreal  [address]  [place] [address]        [place] [facility]    Chief Complaint   Patient presents with     half-way Acute       [place] Medical Record Number:  [id number]  Place of Service where encounter took place:  [facility]  ([facility]) [[id number]]    HPI:    Citlalli Villarreal is a 72 year old  (1945), who is being seen today for an episodic care visit.  HPI information obtained from: facility chart records, facility staff, patient report and [facility] chart review.    Today's concern is:     Cerebrovascular accident (CVA) due to embolism of left anterior cerebral artery (H)  Intracranial bleed (H)  Deny-neglect of right side  Closed fracture of distal end of right radius with routine healing, unspecified fracture morphology, subsequent encounter  Atrial fibrillation with rapid ventricular response (H)  Gastrointestinal hemorrhage, unspecified gastrointestinal hemorrhage type  Urinary retention with incomplete bladder emptying  Cognitive impairment     Patient reports occasional pain in L arm, no chest pain, no cough, no congestion. She reports no dysuria.  She is angry and swearing throughout conversation, spitting and sneering.  She refuses to answer questions, rolls away (while sitting in wheelchair).     Therapy staff report the following scores on cognitive testing:  SLUMS 11/30  MOCA 11/30  CPT-refuses to complete the test, on partial completion scoring 3,3,4 on units  (perfect score 6 per unit)  Clock Drawing Test 5/67 - this is done with non-dominant hand so likely less accurate  CLQT 105/183 on memory portion and 24/37 on language portion--this indicates moderate cognitive impairment     Son, Jose Armando, is planning to try to obtain guardianship so has papers that need to be signed by physician for court.  These will be given to Dr. Johnson on Monday when he rounds.     ALLERGIES: Review of patient's allergies indicates no known  allergies.  Past Medical, Surgical, Family and Social History reviewed and updated in [facility].    Current Outpatient Prescriptions   Medication Sig Dispense Refill     ACETAMINOPHEN PO Take 650 mg by mouth 3 times daily And give 650 mg PO TID PRN for pain 1-3 out of 10       AMIODARONE HCL PO Take 200 mg by mouth daily        ASPIRIN PO Take 81 mg by mouth daily       ATORVASTATIN CALCIUM PO Take 20 mg by mouth daily       DIGOXIN PO Take 125 mcg by mouth daily       DILTIAZEM HCL PO Take 240 mg by mouth daily       GABAPENTIN PO Take 100 mg by mouth At Bedtime       HYDROcodone-acetaminophen (NORCO) 7.5-325 MG per tablet Take 1-2 tablets by mouth every 4 hours as needed for severe pain Give 1 tab for pain 4-7 out of 10 and 2 tab for pain 8-10 out of 10       Multiple Vitamins-Minerals (MULTIVITAL-M PO) Take 1 tablet by mouth daily       OMEPRAZOLE PO Take 20 mg by mouth daily       Medications reviewed:  Medications reconciled to facility chart and changes were made to reflect current medications as identified as above med list. Below are the changes that were made:   Medications stopped since last EPIC medication reconciliation:   There are no discontinued medications.    Medications started since last [facility] medication reconciliation:  No orders of the defined types were placed in this encounter.    REVIEW OF SYSTEMS:  Limited secondary to cognitive impairment but today pt reports pain in L shoulder, no other concerns and is not cooperative to answer questions.     Physical Exam:  /75  Pulse 61  Temp 98.3  F (36.8  C)  Resp 18  Wt 117 lb (53.1 kg)  SpO2 97%  GENERAL APPEARANCE:  Alert, thin, anxious, uncooperative  RESP:  respiratory effort and palpation of chest normal, lungs clear to auscultation , no respiratory distress  CV:  Palpation and auscultation of heart done , no edema, irregular rhythm with occ missed beats, extra beats-irregular pattern, rate-normal  M/S:   Gait and station abnormal ambulating  with therapy staff, unsteady on her feet  SKIN:  Inspection of skin and subcutaneous tissue reveals no open areas, dry skin of feet, dystrophic nails  PSYCH:  oriented to self with forgetfulness, insight and judgement impaired, cognitive testing as noted above, affect abnormal suspicious, belligerent, anxious, thought content scattered    Recent Labs:   11/26/2018   Hgb 13.4  11/26/18  Na 141, K 4.8, Creat 0.83  Dig 1.0  Albumin 3.0    Assessment/Plan:  Cerebrovascular accident (CVA) due to embolism of left anterior cerebral artery (H)  Intracranial bleed (H)  Deny-neglect of right side  Continues with mild right sided weakness, but is improving with therapy. She is not always compliant with therapy - sometimes angry and refusing.  She is improving from a physical standpoint. On aspirin. Per recommendation of neurology, needs follow up CT scan (currently scheduled for 12/13/18) then addition of plavix if ICH has resolved-unless cardiology recommends OAC for afib.     Closed fracture of distal end of right radius with routine healing, unspecified fracture morphology, subsequent encounter  Has removable wrist splint on R wrist. Underlying skin dry and flaking with previous incision CDI and well healed without open areas, no pain.  Has follow up appointment with ortho this week on  11/30/18.     Atrial fibrillation with rapid ventricular response (H)  Remains on aspirin, amiodarone, diltiazem, digoxin.  Most recent dig level within normal limits. Need follow up with cardiology and family requested referral to [employer] [facility] [employer] [facility], appointment not yet made.  Referral in EPIC, message left for nurse manager to follow up.     Gastrointestinal hemorrhage, unspecified gastrointestinal hemorrhage type  No reports of melena, hematochezia, or BRBPR.  Hemoglobin stable and improving as noted above, now 13.1. On omeprazole. The current medical regimen is effective;  continue present plan and medications.     Urinary retention  with incomplete bladder emptying  Urinary retention has resolved, documentation in nursing notes reveals <150 ml in bladder consistently.  No further checking needed.     Cognitive impairment  Cognitive scores as noted above. Per son, home where she was living is uninhabitable due to cat/bird feces, her bills were not paid, and she was not taking care of herself. He is pursuing guardianship, paperwork to be completed by physician.  She is safe, but unhappy, in her current living situation.       Orders:  1. discontinue PVRs now    Electronically signed by  JADA Us CNP      Sincerely,        JADA Us CNP

## 2018-11-28 NOTE — PROGRESS NOTES
[facility]    Chief Complaint   Patient presents with     correction Acute       [place] Medical Record Number:  [id number]  Place of Service where encounter took place:  [facility]  ([facility]) [[id number]]    HPI:    Citlalli Villarreal is a 72 year old  (1945), who is being seen today for an episodic care visit.  HPI information obtained from: facility chart records, facility staff, patient report and [facility] chart review.    Today's concern is:     Cerebrovascular accident (CVA) due to embolism of left anterior cerebral artery (H)  Intracranial bleed (H)  Deny-neglect of right side  Closed fracture of distal end of right radius with routine healing, unspecified fracture morphology, subsequent encounter  Atrial fibrillation with rapid ventricular response (H)  Gastrointestinal hemorrhage, unspecified gastrointestinal hemorrhage type  Urinary retention with incomplete bladder emptying  Cognitive impairment     Patient reports occasional pain in L arm, no chest pain, no cough, no congestion. She reports no dysuria.  She is angry and swearing throughout conversation, spitting and sneering.  She refuses to answer questions, rolls away (while sitting in wheelchair).     Therapy staff report the following scores on cognitive testing:  SLUMS 11/30  MOCA 11/30  CPT-refuses to complete the test, on partial completion scoring 3,3,4 on units  (perfect score 6 per unit)  Clock Drawing Test 5/67 - this is done with non-dominant hand so likely less accurate  CLQT 105/183 on memory portion and 24/37 on language portion--this indicates moderate cognitive impairment     Son, Jose Armando, is planning to try to obtain guardianship so has papers that need to be signed by physician for court.  These will be given to Dr. Johnson on Monday when he rounds.     ALLERGIES: Review of patient's allergies indicates no known allergies.  Past Medical, Surgical, Family and Social History reviewed and updated in  [facility].    Current Outpatient Prescriptions   Medication Sig Dispense Refill     ACETAMINOPHEN PO Take 650 mg by mouth 3 times daily And give 650 mg PO TID PRN for pain 1-3 out of 10       AMIODARONE HCL PO Take 200 mg by mouth daily        ASPIRIN PO Take 81 mg by mouth daily       ATORVASTATIN CALCIUM PO Take 20 mg by mouth daily       DIGOXIN PO Take 125 mcg by mouth daily       DILTIAZEM HCL PO Take 240 mg by mouth daily       GABAPENTIN PO Take 100 mg by mouth At Bedtime       HYDROcodone-acetaminophen (NORCO) 7.5-325 MG per tablet Take 1-2 tablets by mouth every 4 hours as needed for severe pain Give 1 tab for pain 4-7 out of 10 and 2 tab for pain 8-10 out of 10       Multiple Vitamins-Minerals (MULTIVITAL-M PO) Take 1 tablet by mouth daily       OMEPRAZOLE PO Take 20 mg by mouth daily       Medications reviewed:  Medications reconciled to facility chart and changes were made to reflect current medications as identified as above med list. Below are the changes that were made:   Medications stopped since last EPIC medication reconciliation:   There are no discontinued medications.    Medications started since last [facility] medication reconciliation:  No orders of the defined types were placed in this encounter.    REVIEW OF SYSTEMS:  Limited secondary to cognitive impairment but today pt reports pain in L shoulder, no other concerns and is not cooperative to answer questions.     Physical Exam:  /75  Pulse 61  Temp 98.3  F (36.8  C)  Resp 18  Wt 117 lb (53.1 kg)  SpO2 97%  GENERAL APPEARANCE:  Alert, thin, anxious, uncooperative  RESP:  respiratory effort and palpation of chest normal, lungs clear to auscultation , no respiratory distress  CV:  Palpation and auscultation of heart done , no edema, irregular rhythm with occ missed beats, extra beats-irregular pattern, rate-normal  M/S:   Gait and station abnormal ambulating with therapy staff, unsteady on her feet  SKIN:  Inspection of skin and subcutaneous  tissue reveals no open areas, dry skin of feet, dystrophic nails  PSYCH:  oriented to self with forgetfulness, insight and judgement impaired, cognitive testing as noted above, affect abnormal suspicious, belligerent, anxious, thought content scattered    Recent Labs:   11/26/2018   Hgb 13.4  11/26/18  Na 141, K 4.8, Creat 0.83  Dig 1.0  Albumin 3.0    Assessment/Plan:  Cerebrovascular accident (CVA) due to embolism of left anterior cerebral artery (H)  Intracranial bleed (H)  Deny-neglect of right side  Continues with mild right sided weakness, but is improving with therapy. She is not always compliant with therapy - sometimes angry and refusing.  She is improving from a physical standpoint. On aspirin. Per recommendation of neurology, needs follow up CT scan (currently scheduled for 12/13/18) then addition of plavix if ICH has resolved-unless cardiology recommends OAC for afib.     Closed fracture of distal end of right radius with routine healing, unspecified fracture morphology, subsequent encounter  Has removable wrist splint on R wrist. Underlying skin dry and flaking with previous incision CDI and well healed without open areas, no pain.  Has follow up appointment with ortho this week on  11/30/18.     Atrial fibrillation with rapid ventricular response (H)  Remains on aspirin, amiodarone, diltiazem, digoxin.  Most recent dig level within normal limits. Need follow up with cardiology and family requested referral to [facility], appointment not yet made.  Referral in EPIC, message left for nurse manager to follow up.     Gastrointestinal hemorrhage, unspecified gastrointestinal hemorrhage type  No reports of melena, hematochezia, or BRBPR.  Hemoglobin stable and improving as noted above, now 13.1. On omeprazole. The current medical regimen is effective;  continue present plan and medications.     Urinary retention with incomplete bladder emptying  Urinary retention has resolved, documentation in  nursing notes reveals <150 ml in bladder consistently.  No further checking needed.     Cognitive impairment  Cognitive scores as noted above. Per son, home where she was living is uninhabitable due to cat/bird feces, her bills were not paid, and she was not taking care of herself. He is pursuing guardianship, paperwork to be completed by physician.  She is safe, but unhappy, in her current living situation.       Orders:  1. discontinue PVRs now    Electronically signed by  JADA Us CNP

## 2018-11-30 NOTE — PROGRESS NOTES
[facility]  PRIMARY CARE PROVIDER AND CLINIC:  No primary care provider on file. No primary physician on file.  Chief Complaint   Patient presents with     Hospital F/U     [place] Medical Record Number:  [id number]  Place of Service where encounter took place:  [facility]  (S) [[id number]]    HPI:    Citlalli Villarreal is a 72 year old  (1945),admitted to the above facility from [facility].  Hospital stay 10/14/18 through 10/20/18, then [facility] TCU 10/20/18-10/26/18, and [facility] ED 10/26/18, then back to [facility] until her move to [facility] on 11/14/18. Admitted to this facility for  medical management and nursing care.  HPI information obtained from: facility chart records, facility staff, patient report and [place] Epic chart review.      GNP reports Resident got a new CVA, laid on the ground for 2 days before being found. She has significant cognitive impairment and a very difficult home situation, living in squalor and cannot go back there. She had been estranged from her family for 15+ years.     Current issues are:      -  Resident seen and examined. Reports aching pain in the left shoulder area, aggravated with movements.   - Reports doing fine otherwise  - Denies CP, SOB,.   - Reports sleep, appetite and BM are fine.   - RN has no concern today.   --------------------------------  CODE STATUS/ADVANCE DIRECTIVES DISCUSSION:   CPR/Full code   ALLERGIES:Review of patient's allergies indicates no known allergies.   Medical History    Medical History Date Comments   Intracranial bleed (H) 10/14/2018     HLD (hyperlipidemia) 10/15/2018     Recurrent falls 10/15/2018     Atrial fibrillation with rapid ventricular response (H) 10/15/2018     Traumatic rhabdomyolysis, initial encounter (H) 10/15/2018     Compression of brain (H) 10/15/2018     Wrist fracture, right, closed, initial encounter 10/16/2018     Intracranial  atherosclerosis 10/16/2018 Severe and diffuse   Carotid stenosis, asymptomatic, bilateral 10/16/2018     Deny-neglect of right side 10/16/2018     Altered mental status, unspecified altered mental status type 10/16/2018     Fracture of right distal radius 10/14/2018 Added automatically from request for surgery [id number]   Cellulitis of left forearm 10/18/2018     Fall, initial encounter 10/26/2018     Social History    Tobacco Use Types Packs/Day Years Used Date   Current Every Day Smoker           Smokeless Tobacco: Never Used           Alcohol Use Drinks/Week oz/Week Comments   No           Sex Assigned at Birth Date Recorded   Not on file       Job Start Date Occupation Industry   Not on file Not on file Not on file     Travel History Travel Start Travel End   No recent travel history available.       PAST SURGICAL HISTORY:     Surgery Date Laterality Comments   FOREARM FRACTURE SURGERY 10/19/2018 Right Procedure: OPEN REDUCTION INTERNAL FIXATION, FRACTURE, RADIUS RIGHT; Surgeon: Yudelka Briseno MD; Location: [facility]; Service:      FAMILY HISTORY: Family history is unknown by patient.  SOCIAL HISTORY:      Post Discharge Medication Reconciliation Status: discharge medications reconciled and changed, per note/orders (see AVS).  Current Outpatient Prescriptions   Medication Sig Dispense Refill     ACETAMINOPHEN PO Take 650 mg by mouth 3 times daily And give 650 mg PO TID PRN for pain 1-3 out of 10       AMIODARONE HCL PO Take 200 mg by mouth daily        ASPIRIN PO Take 81 mg by mouth daily       ATORVASTATIN CALCIUM PO Take 20 mg by mouth daily       DIGOXIN PO Take 125 mcg by mouth daily       DILTIAZEM HCL PO Take 240 mg by mouth daily       GABAPENTIN PO Take 100 mg by mouth At Bedtime       HYDROcodone-acetaminophen (NORCO) 7.5-325 MG per tablet Take 1-2 tablets by mouth every 4 hours as needed for severe pain Give 1 tab for pain 4-7 out of 10 and 2 tab for pain 8-10 out of 10       Multiple  Vitamins-Minerals (MULTIVITAL-M PO) Take 1 tablet by mouth daily       OMEPRAZOLE PO Take 20 mg by mouth daily         ROS:  10 point ROS of systems including Constitutional, Eyes, Respiratory, Cardiovascular, Gastroenterology, Genitourinary, Integumentary, Musculoskeletal, Psychiatric were all negative except for pertinent positives noted in my HPI.    Exam:  /66  Pulse 72  Temp 97.5  F (36.4  C)  Resp 18  SpO2 98%  GENERAL APPEARANCE:  Alert, in no distress  ENT:  Mouth and posterior oropharynx normal, moist mucous membranes  EYES:  EOM, conjunctivae, lids, pupils and irises normal  RESP:  respiratory effort and palpation of chest normal, lungs clear to auscultation   CV:  Palpation and auscultation of heart done , no edema, irregular rhythm . no murmur.   ABDOMEN:  normal bowel sounds, soft, nontender, no hepatosplenomegaly or other masses  M/S:   Gait and station abnormal uses WC.   SKIN:  Inspection of skin and subcutaneous tissue baseline, Palpation of skin and subcutaneous tissue baseline  NEURO:   Hand : 4/5 R, 5/5 L. DTR: tricep +3 on R, and 2+ on the left., no purposeful movement in upper and lower extremities  PSYCH:  oriented to person only. anxious mood.     Lab/Diagnostic data:    CBC RESULTS:   Recent Labs   Lab Test 11/26/18   WBC  9.7   RBC  4.29   HGB  13.4   HCT  42.9   MCV  100   MCH  31.2   MCHC  31.2*   RDW  13.7   PLT  293       Last Basic Metabolic Panel:  Recent Labs   Lab Test 11/26/18   NA  141   POTASSIUM  4.8   CHLORIDE  105   RODOLFO  9.3   CO2  29   BUN  16   CR  0.83   GLC  98       Liver Function Studies -   Recent Labs   Lab Test 11/26/18   PROTTOTAL  6.3   ALBUMIN  3.0*   BILITOTAL  0.3   ALKPHOS  105   AST  13   ALT  9       ASSESSMENT/PLAN:  Intracranial bleed (H)  Cerebrovascular accident (CVA) due to embolism of left anterior cerebral artery (H)  Altered mental status, unspecified altered mental status type  Deny-neglect of right side  - right sided weakness  improving. Followed by Neurology, CT scan next week, if ICH resolved, then plavix will be added unless Cardio recommend OAC for a-fib per medical records.   - continue to follow on the consultants' recommendations.     Atrial fibrillation with rapid ventricular response (H)  Carotid stenosis, bilateral  - CVR. On ASA, dig, and CCB. Followed by Cardio. Appreciate help.     Wrist fracture, right, closed, initial encounter  Closed fracture of distal end of right radius with routine healing, unspecified fracture morphology, subsequent encounter  - analgesia at  Baseline.     Gastrointestinal hemorrhage, unspecified gastrointestinal hemorrhage type  - at baseline. On omeprazole.     Cognitive Impairment  - SLUMS 11/30, MOCA 11/30, indicates severe  - CLQT 105/183 on memory portion and 24/37 on language portion--this indicates moderate cognitive impairment   - - Continue to anticipate needs. Chronic condition, ongoing decline expected.   -  Continue to provide redirection and reassurance as needed. Maintain safe living situation with goals focused on comfort.    Physical Deconditioning:  - started rehab program, making a progress, continue until desired goal is achieved.     Orders:  1. - See above, otherwise, continue the rest of the current POC.     Electronically signed by:  Pasquale Johnson MD

## 2018-12-02 VITALS
TEMPERATURE: 97.5 F | SYSTOLIC BLOOD PRESSURE: 120 MMHG | HEART RATE: 72 BPM | DIASTOLIC BLOOD PRESSURE: 66 MMHG | OXYGEN SATURATION: 98 % | RESPIRATION RATE: 18 BRPM

## 2018-12-03 ENCOUNTER — NURSING HOME VISIT (OUTPATIENT)
Dept: GERIATRICS | Facility: CLINIC | Age: 73
End: 2018-12-03
Payer: MEDICARE

## 2018-12-03 DIAGNOSIS — F03.90 DEMENTIA WITHOUT BEHAVIORAL DISTURBANCE, UNSPECIFIED DEMENTIA TYPE: ICD-10-CM

## 2018-12-03 DIAGNOSIS — I65.23 CAROTID STENOSIS, ASYMPTOMATIC, BILATERAL: ICD-10-CM

## 2018-12-03 DIAGNOSIS — I62.9 INTRACRANIAL BLEED (H): Primary | ICD-10-CM

## 2018-12-03 DIAGNOSIS — I63.422 CEREBROVASCULAR ACCIDENT (CVA) DUE TO EMBOLISM OF LEFT ANTERIOR CEREBRAL ARTERY (H): ICD-10-CM

## 2018-12-03 DIAGNOSIS — I48.20 CHRONIC ATRIAL FIBRILLATION (H): ICD-10-CM

## 2018-12-03 DIAGNOSIS — S62.101A WRIST FRACTURE, RIGHT, CLOSED, INITIAL ENCOUNTER: ICD-10-CM

## 2018-12-03 DIAGNOSIS — R41.4 HEMI-NEGLECT OF RIGHT SIDE: ICD-10-CM

## 2018-12-03 PROCEDURE — 99305 1ST NF CARE MODERATE MDM 35: CPT | Performed by: FAMILY MEDICINE

## 2018-12-03 NOTE — LETTER
12/3/2018        RE: Citlalli Villarreal  [address] [facility] [address]        [facility]  PRIMARY CARE PROVIDER AND CLINIC:  No primary care provider on file. No primary physician on file.  Chief Complaint   Patient presents with     Hospital F/U     [place] Medical Record Number:  [id number]  Place of Service where encounter took place:  [facility]  ([facility]) [[id number]]    HPI:    Citlalli Villarreal is a 72 year old  (1945),admitted to the above facility from [facility].  Hospital stay 10/14/18 through 10/20/18, then [facility] TCU 10/20/18-10/26/18, and [facility] ED 10/26/18, then back to [facility] until her move to [facility] on 11/14/18. Admitted to this facility for  medical management and nursing care.  HPI information obtained from: facility chart records, facility staff, patient report and [facility] chart review.      GNP reports Resident got a new CVA, laid on the ground for 2 days before being found. She has significant cognitive impairment and a very difficult home situation, living in squ[place]or and cannot go back there. She had been estranged from her family for 15+ years.     Current issues are:      -  Resident seen and examined. Reports aching pain in the left shoulder area, aggravated with movements.   - Reports doing fine otherwise  - Denies CP, SOB,.   - Reports sleep, appetite and BM are fine.   - RN has no concern today.   --------------------------------  CODE STATUS/ADVANCE DIRECTIVES DISCUSSION:   CPR/Full code   ALLERGIES:Review of patient's allergies indicates no known allergies.   Medical History    Medical History Date Comments   Intracranial bleed (H) 10/14/2018     HLD (hyperlipidemia) 10/15/2018     Recurrent falls 10/15/2018     Atrial fibrillation with rapid ventricular response (H) 10/15/2018     Traumatic rhabdomyolysis, initial encounter (H) 10/15/2018     Compression of brain (H) 10/15/2018      Wrist fracture, right, closed, initial encounter 10/16/2018     Intracranial atherosclerosis 10/16/2018 Severe and diffuse   Carotid stenosis, asymptomatic, bilateral 10/16/2018     Deny-neglect of right side 10/16/2018     Altered mental status, unspecified altered mental status type 10/16/2018     Fracture of right distal radius 10/14/2018 Added automatically from request for surgery [id number]   Cellulitis of left forearm 10/18/2018     Fall, initial encounter 10/26/2018     Social History    Tobacco Use Types Packs/Day Years Used Date   Current Every Day Smoker           Smokeless Tobacco: Never Used           Alcohol Use Drinks/Week oz/Week Comments   No           Sex Assigned at Birth Date Recorded   Not on file       Job Start Date Occupation Industry   Not on file Not on file Not on file     Travel History Travel Start Travel End   No recent travel history available.       PAST SURGICAL HISTORY:     Surgery Date Laterality Comments   FOREARM FRACTURE SURGERY 10/19/2018 Right Procedure: OPEN REDUCTION INTERNAL FIXATION, FRACTURE, RADIUS RIGHT; Surgeon: Yudelka Briseno MD; Location: [facility]; Service:      FAMILY HISTORY: Family history is unknown by patient.  SOCIAL HISTORY:      Post Discharge Medication Reconciliation Status: discharge medications reconciled and changed, per note/orders (see AVS).  Current Outpatient Prescriptions   Medication Sig Dispense Refill     ACETAMINOPHEN PO Take 650 mg by mouth 3 times daily And give 650 mg PO TID PRN for pain 1-3 out of 10       AMIODARONE HCL PO Take 200 mg by mouth daily        ASPIRIN PO Take 81 mg by mouth daily       ATORVASTATIN CALCIUM PO Take 20 mg by mouth daily       DIGOXIN PO Take 125 mcg by mouth daily       DILTIAZEM HCL PO Take 240 mg by mouth daily       GABAPENTIN PO Take 100 mg by mouth At Bedtime       HYDROcodone-acetaminophen (NORCO) 7.5-325 MG per tablet Take 1-2 tablets by mouth every 4 hours as needed for severe pain Give 1 tab  for pain 4-7 out of 10 and 2 tab for pain 8-10 out of 10       Multiple Vitamins-Minerals (MULTIVITAL-M PO) Take 1 tablet by mouth daily       OMEPRAZOLE PO Take 20 mg by mouth daily         ROS:  10 point ROS of systems including Constitutional, Eyes, Respiratory, Cardiovascular, Gastroenterology, Genitourinary, Integumentary, Musculoskeletal, Psychiatric were all negative except for pertinent positives noted in my HPI.    Exam:  /66  Pulse 72  Temp 97.5  F (36.4  C)  Resp 18  SpO2 98%  GENERAL APPEARANCE:  Alert, in no distress  ENT:  Mouth and posterior oropharynx normal, moist mucous membranes  EYES:  EOM, conjunctivae, lids, pupils and irises normal  RESP:  respiratory effort and palpation of chest normal, lungs clear to auscultation   CV:  Palpation and auscultation of heart done , no edema, irregular rhythm . no murmur.   ABDOMEN:  normal bowel sounds, soft, nontender, no hepatosplenomegaly or other masses  M/S:   Gait and station abnormal uses WC.   SKIN:  Inspection of skin and subcutaneous tissue baseline, Palpation of skin and subcutaneous tissue baseline  NEURO:   Hand : 4/5 R, 5/5 L. DTR: tricep +3 on R, and 2+ on the left., no purposeful movement in upper and lower extremities  PSYCH:  oriented to person only. anxious mood.     Lab/Diagnostic data:    CBC RESULTS:   Recent Labs   Lab Test 11/26/18   WBC  9.7   RBC  4.29   HGB  13.4   HCT  42.9   MCV  100   MCH  31.2   MCHC  31.2*   RDW  13.7   PLT  293       Last Basic Metabolic Panel:  Recent Labs   Lab Test 11/26/18   NA  141   POTASSIUM  4.8   CHLORIDE  105   RODOLFO  9.3   CO2  29   BUN  16   CR  0.83   GLC  98       Liver Function Studies -   Recent Labs   Lab Test 11/26/18   PROTTOTAL  6.3   ALBUMIN  3.0*   BILITOTAL  0.3   ALKPHOS  105   AST  13   ALT  9       ASSESSMENT/PLAN:  Intracranial bleed (H)  Cerebrovascular accident (CVA) due to embolism of left anterior cerebral artery (H)  Altered mental status, unspecified altered mental  status type  Deny-neglect of right side  - right sided weakness improving. Followed by Neurology, CT scan next week, if ICH resolved, then plavix will be added unless Cardio recommend OAC for a-fib per medical records.   - continue to follow on the consultants' recommendations.     Atrial fibrillation with rapid ventricular response (H)  Carotid stenosis, bilateral  - CVR. On ASA, dig, and CCB. Followed by Cardio. Appreciate help.     Wrist fracture, right, closed, initial encounter  Closed fracture of distal end of right radius with routine healing, unspecified fracture morphology, subsequent encounter  - analgesia at  Baseline.     Gastrointestinal hemorrhage, unspecified gastrointestinal hemorrhage type  - at baseline. On omeprazole.     Cognitive Impairment  - SLUMS 11/30, MOCA 11/30, indicates severe  - CLQT 105/183 on memory portion and 24/37 on language portion--this indicates moderate cognitive impairment   - - Continue to anticipate needs. Chronic condition, ongoing decline expected.   -  Continue to provide redirection and reassurance as needed. Maintain safe living situation with goals focused on comfort.    Physical Deconditioning:  - started rehab program, making a progress, continue until desired goal is achieved.     Orders:  1. - See above, otherwise, continue the rest of the current POC.     Electronically signed by:  Pasquale Johnson MD      Sincerely,        Pasquale Johnson MD

## 2018-12-10 ENCOUNTER — OFFICE VISIT (OUTPATIENT)
Dept: CARDIOLOGY | Facility: CLINIC | Age: 73
End: 2018-12-10
Attending: NURSE PRACTITIONER
Payer: MEDICARE

## 2018-12-10 ENCOUNTER — HOSPITAL ENCOUNTER (OUTPATIENT)
Dept: CARDIOLOGY | Facility: CLINIC | Age: 73
Discharge: HOME | End: 2018-12-10
Attending: INTERNAL MEDICINE | Admitting: INTERNAL MEDICINE
Payer: MEDICARE

## 2018-12-10 VITALS
WEIGHT: 121.4 LBS | HEART RATE: 67 BPM | DIASTOLIC BLOOD PRESSURE: 78 MMHG | OXYGEN SATURATION: 99 % | SYSTOLIC BLOOD PRESSURE: 154 MMHG

## 2018-12-10 DIAGNOSIS — I48.19 PERSISTENT ATRIAL FIBRILLATION (H): Primary | ICD-10-CM

## 2018-12-10 DIAGNOSIS — I48.19 PERSISTENT ATRIAL FIBRILLATION (H): ICD-10-CM

## 2018-12-10 PROCEDURE — 99204 OFFICE O/P NEW MOD 45 MIN: CPT | Mod: 25 | Performed by: INTERNAL MEDICINE

## 2018-12-10 PROCEDURE — 0296T ZIO PATCH HOLTER ADULT PEDIATRIC GREATER THAN 48 HRS: CPT

## 2018-12-10 PROCEDURE — 0298T ZIO PATCH HOLTER ADULT PEDIATRIC GREATER THAN 48 HRS: CPT | Performed by: INTERNAL MEDICINE

## 2018-12-10 RX ORDER — DILTIAZEM HYDROCHLORIDE 120 MG/1
360 TABLET, FILM COATED ORAL DAILY
Qty: 270 TABLET | Refills: 3 | COMMUNITY
Start: 2018-12-10 | End: 2019-02-03

## 2018-12-10 NOTE — PROGRESS NOTES
CARDIOLOGY CLINIC CONSULTATION    REASON FOR CONSULT: Atrial fibrillation and LVH    PRIMARY CARE PHYSICIAN:  Physician No Ref-Primary    HISTORY OF PRESENT ILLNESS:  Citlalli Villarreal is a 72 year old female who was admitted to [facility] 10/14/18 through 10/20/18, then [facility] TCU 10/20/18-10/26/18, and [facility] ED 10/26/18, then back to [facility] until her move to [facility] on 11/14/18. Patient has not received medical care for several years, of unknown length. Per prior notes - She has been living in her own home, has 2 cats and 5 birds. She had been estranged from her spouse for many years (over 20 years?) and her children for over 15 years. Her significant other, who lived with her, was moved to a senior living option several years ago and she had been alone. Her home is reported to be in significant disrepair, dirty, and with cat feces and bird droppings throughout the home.  She was admitted to the hospital on 10/14/18 after she did not answer her phone for at least 2 days, and was found down on the floor in her home, reporting that she hit her head on the table and the floor-trying to get up but was unable to do so. A R wrist deformity was noted, several bruises and low body temperature. She was found to have Afib with RVR, rhabdomyolysis, intracranial head bleed, + FOBT. She was found to have subacute large L occipital intraparenchymal hemorrhage transformation with suspected ischemic infarct, possibly embolic in the setting of Afib. Also found to have severe high-grade narrowing of intracranial vertebral and basilar arteries, L PCA, L MCA, R MCA as well as L ICA stenosis 70-80%, R ICA stenosis 60-70%. She had R hemineglect, worsening dysarthria.  She was stabilized and transitioned to a TCU for ongoing care. At that TCU, she was found unresponsive on the floor and was seen at [facility] ED. There, she was found to have urinary retention and CT scans of neck,  head unremarkable for new issues, so she was returned to that TCU while awaiting placement at another facility.     She was started on diltiazem 240 mg, amiodarone and digoxin.  That stabilized her heart rate during atrial fibrillation.  She also had an echocardiogram during that visit which reported normal EF with LVH and some trace pericardial effusion.  Some concern was mentioned about possible amyloidosis.    She was also started on statin for her stroke.  She has seen neurology and neurosurgery.  Per the last neuro notes it appears that her repeat CT scan showed significant resolution of hemorrhage.  As such aspirin was started.  Plan was to repeat a CT scan later this week and if there was no further hemorrhage then to start her on Plavix in addition to that.  However her appointment with her neurologist has gotten canceled and pushed out to end of December.      She is here to establish care with cardiology regarding her atrial fibrillation and LVH.  Patient is here with her son Jose Armando.  Denies chest pain shortness of breath syncope or presyncope.  She does complain of some palpitations.    PAST MEDICAL HISTORY:  Past Medical History:   Diagnosis Date     Cerebral infarction (H)      Right wrist fracture 10/2018       MEDICATIONS:  Current Outpatient Medications   Medication     ACETAMINOPHEN PO     AMIODARONE HCL PO     ASPIRIN PO     ATORVASTATIN CALCIUM PO     DIGOXIN PO     DILTIAZEM HCL PO     GABAPENTIN PO     HYDROcodone-acetaminophen (NORCO) 7.5-325 MG per tablet     Multiple Vitamins-Minerals (MULTIVITAL-M PO)     OMEPRAZOLE PO     No current facility-administered medications for this visit.        ALLERGIES:  No Known Allergies    SOCIAL HISTORY:  I have reviewed this patient's social history and updated it with pertinent information if needed. Citlalli Villarreal      FAMILY HISTORY:  I have reviewed this patient's family history and updated it with pertinent information if needed.   Family History    Family history unknown: Yes       REVIEW OF SYSTEMS:  Skin:  Negative     Eyes:  Positive for visual blurring;glasses  ENT:  Positive for sinus trouble  Respiratory:  Positive for shortness of breath  Cardiovascular:    Positive for;fatigue;lightheadedness;dizziness  Gastroenterology: Negative    Genitourinary:  Positive for urinary frequency;urgency  Musculoskeletal:  Positive for joint pain;joint swelling;joint stiffness  Neurologic:  Positive for stroke;numbness or tingling of hands;memory problems  Psychiatric:  Positive for anxiety;depression  Heme/Lymph/Imm:  Negative    Endocrine:  Negative        PHYSICAL EXAM:      BP: 154/78 Pulse: 67     SpO2: 99 %      Vital Signs with Ranges  Pulse:  [67-79] 67  BP: (154-195)/(78-99) 154/78  SpO2:  [99 %] 99 %  121 lbs 6.4 oz    Constitutional: awake, alert, no distress  Eyes: PERRL, sclera nonicteric  ENT: trachea midline  Respiratory: Clear to auscultation bilaterally  Cardiovascular: JVP is normal, there is no lower extremity edema.  Irregular heart sounds currently in atrial fibrillation.  Heart rate is 70.  Soft ejection systolic murmur.  No rubs or gallops.  GI: nondistended, nontender, bowel sounds present  Lymph/Hematologic: no lymphadenopathy  Skin: dry, no rash  Musculoskeletal: good muscle tone, strength 5/5 in upper and lower extremities  Neurologic: no focal deficits  Neuropsychiatric: appropriate affact    DATA:  Labs: Pertinent cardiac labs reviewed    Echocardiogram: In care everywhere from October 2018  Left Ventricle: Normal left ventricular size and systolic function.The calculated left ventricular ejection fraction is 57%. This represents a normal ejection fraction. Moderate concentric hypertrophy noted. E/e' 8 to 15, which is equivocal for estimating LV filling pressures. The left ventricular wall motion is normal. Right Ventricle: Normal right ventricular size and systolic function. TAPSE is abnormal, which is consistent with abnormal right  ventricular systolic function. Left Atrium: Left atrial volume is moderately increased. There is no atrial septal defect. Aortic Valve: Increased echodensities of the aortic valve leaflets. It is difficult to identify individual leaflets. Suspect trileaflet valve. There is moderate global calcification with reduced excursion of the aortic valve present. Mild aortic stenosis. No aortic regurgitation is present.Pericardium: Possible trace pericardial effusion. No previous study for comparison. Significant left ventricular hypertrophy along with pericardial effusion. Consider amyloidosis.    EKG: Atrial fibrillation    ASSESSMENT:  1. Atrial fibrillation persistent, ECG cannot be obtained at [facility] today.  2. Left ventricular hypertrophy  3. Ischemic stroke with hemorrhagic transformation in October 2018    RECOMMENDATIONS:  1. Patient continues to remain in atrial fibrillation.  She is currently on 200 mg of amiodarone, 125 of digoxin, and 240 mg of diltiazem.  She is currently not on anticoagulation.  As such I do not think amiodarone is a great drug for her (1) because it is not stabilizing her out of AFib and (2) due to risk of conversion and potentially precipitating another stroke as she is un-anticoagulated. I will discontinue amiodarone.  2. Her echocardiogram has shown left ventricular hypertrophy.  This is likely hypertensive heart disease however amyloidosis was mentioned on the differential.  She cannot get an MRI because she has metal in her body she says after recent hand surgery.  We will repeat an echocardiogram in 6 months to assess LVH progression.  In either case I will go ahead and stop her digoxin if it indeed turns out to be amyloid.   3. To compensate for stopping the amiodarone and digoxin I will increase her diltiazem to 360 mg daily.  I will get a 14-day cardiac monitor to assess heart rate control.  4. She is currently only on aspirin 81 mg daily.  She is scheduled to see her  neurologist end of December.  I recommend anticoagulation with DOAC if okay with the neurology teams.  5. Follow-up with advanced practice provider in 1 month after she sees neurology to make a decision on anticoagulation. If she is deemed to not be a candidate for anticoagulation per neurology due to bleeding risks, then I recommend that the advanced practice provider refer her for consideration of watchman device.  6. Can see me back in 6 months with an echo after her acute active current issues are stabilized.   7. PCP follow up for HTN management. First BP was high, repeat check was down to 150 systolic. Increase in diltiazem will help.     Nargis Bonds MD, [facility]  Cardiology - [facility] Heart  December 10, 2018

## 2018-12-10 NOTE — LETTER
12/10/2018    Physician No Ref-Primary  No address on file    RE: Citlalli Villarreal       Dear Colleague,    I had the pleasure of seeing Citlalli Villarreal in the [facility].    CARDIOLOGY CLINIC CONSULTATION    REASON FOR CONSULT: Atrial fibrillation and LVH    PRIMARY CARE PHYSICIAN:  Physician No Ref-Primary    HISTORY OF PRESENT ILLNESS:  Citlalli Villarreal is a 72 year old female who was admitted to [facility] 10/14/18 through 10/20/18, then [facility] TCU 10/20/18-10/26/18, and [facility] ED 10/26/18, then back to [facility] until her move to [facility] on 11/14/18. Patient has not received medical care for several years, of unknown length. Per prior notes - She has been living in her own home, has 2 cats and 5 birds. She had been estranged from her spouse for many years (over 20 years?) and her children for over 15 years. Her significant other, who lived with her, was moved to a senior living option several years ago and she had been alone. Her home is reported to be in significant disrepair, dirty, and with cat feces and bird droppings throughout the home.  She was admitted to the hospital on 10/14/18 after she did not answer her phone for at least 2 days, and was found down on the floor in her home, reporting that she hit her head on the table and the floor-trying to get up but was unable to do so. A R wrist deformity was noted, several bruises and low body temperature. She was found to have Afib with RVR, rhabdomyolysis, intracranial head bleed, + FOBT. She was found to have subacute large L occipital intraparenchymal hemorrhage transformation with suspected ischemic infarct, possibly embolic in the setting of Afib. Also found to have severe high-grade narrowing of intracranial vertebral and basilar arteries, L PCA, L MCA, R MCA as well as L ICA stenosis 70-80%, R ICA stenosis 60-70%. She had R hemineglect, worsening dysarthria.  She was stabilized  and transitioned to a TCU for ongoing care. At that TCU, she was found unresponsive on the floor and was seen at [facility] ED. There, she was found to have urinary retention and CT scans of neck, head unremarkable for new issues, so she was returned to that TCU while awaiting placement at another facility.     She was started on diltiazem 240 mg, amiodarone and digoxin.  That stabilized her heart rate during atrial fibrillation.  She also had an echocardiogram during that visit which reported normal EF with LVH and some trace pericardial effusion.  Some concern was mentioned about possible amyloidosis.    She was also started on statin for her stroke.  She has seen neurology and neurosurgery.  Per the last neuro notes it appears that her repeat CT scan showed significant resolution of hemorrhage.  As such aspirin was started.  Plan was to repeat a CT scan later this week and if there was no further hemorrhage then to start her on Plavix in addition to that.  However her appointment with her neurologist has gotten canceled and pushed out to end of December.      She is here to establish care with cardiology regarding her atrial fibrillation and LVH.  Patient is here with her son Jose Armando.  Denies chest pain shortness of breath syncope or presyncope.  She does complain of some palpitations.    PAST MEDICAL HISTORY:  Past Medical History:   Diagnosis Date     Cerebral infarction (H)      Right wrist fracture 10/2018       MEDICATIONS:  Current Outpatient Medications   Medication     ACETAMINOPHEN PO     AMIODARONE HCL PO     ASPIRIN PO     ATORVASTATIN CALCIUM PO     DIGOXIN PO     DILTIAZEM HCL PO     GABAPENTIN PO     HYDROcodone-acetaminophen (NORCO) 7.5-325 MG per tablet     Multiple Vitamins-Minerals (MULTIVITAL-M PO)     OMEPRAZOLE PO     No current facility-administered medications for this visit.        ALLERGIES:  No Known Allergies    SOCIAL HISTORY:  I have reviewed this patient's social history and updated it  with pertinent information if needed. Citlalli Villarreal      FAMILY HISTORY:  I have reviewed this patient's family history and updated it with pertinent information if needed.   Family History   Family history unknown: Yes       REVIEW OF SYSTEMS:  Skin:  Negative     Eyes:  Positive for visual blurring;glasses  ENT:  Positive for sinus trouble  Respiratory:  Positive for shortness of breath  Cardiovascular:    Positive for;fatigue;lightheadedness;dizziness  Gastroenterology: Negative    Genitourinary:  Positive for urinary frequency;urgency  Musculoskeletal:  Positive for joint pain;joint swelling;joint stiffness  Neurologic:  Positive for stroke;numbness or tingling of hands;memory problems  Psychiatric:  Positive for anxiety;depression  Heme/Lymph/Imm:  Negative    Endocrine:  Negative        PHYSICAL EXAM:      BP: 154/78 Pulse: 67     SpO2: 99 %      Vital Signs with Ranges  Pulse:  [67-79] 67  BP: (154-195)/(78-99) 154/78  SpO2:  [99 %] 99 %  121 lbs 6.4 oz    Constitutional: awake, alert, no distress  Eyes: PERRL, sclera nonicteric  ENT: trachea midline  Respiratory: Clear to auscultation bilaterally  Cardiovascular: JVP is normal, there is no lower extremity edema.  Irregular heart sounds currently in atrial fibrillation.  Heart rate is 70.  Soft ejection systolic murmur.  No rubs or gallops.  GI: nondistended, nontender, bowel sounds present  Lymph/Hematologic: no lymphadenopathy  Skin: dry, no rash  Musculoskeletal: good muscle tone, strength 5/5 in upper and lower extremities  Neurologic: no focal deficits  Neuropsychiatric: appropriate affact    DATA:  Labs: Pertinent cardiac labs reviewed    Echocardiogram: In care everywhere from October 2018  Left Ventricle: Normal left ventricular size and systolic function.The calculated left ventricular ejection fraction is 57%. This represents a normal ejection fraction. Moderate concentric hypertrophy noted. E/e' 8 to 15, which is equivocal for estimating LV  filling pressures. The left ventricular wall motion is normal. Right Ventricle: Normal right ventricular size and systolic function. TAPSE is abnormal, which is consistent with abnormal right ventricular systolic function. Left Atrium: Left atrial volume is moderately increased. There is no atrial septal defect. Aortic Valve: Increased echodensities of the aortic valve leaflets. It is difficult to identify individual leaflets. Suspect trileaflet valve. There is moderate global calcification with reduced excursion of the aortic valve present. Mild aortic stenosis. No aortic regurgitation is present.Pericardium: Possible trace pericardial effusion. No previous study for comparison. Significant left ventricular hypertrophy along with pericardial effusion. Consider amyloidosis.    EKG: Atrial fibrillation    ASSESSMENT:  1. Atrial fibrillation persistent, ECG cannot be obtained at [facility] today.  2. Left ventricular hypertrophy  3. Ischemic stroke with hemorrhagic transformation in October 2018    RECOMMENDATIONS:  1. Patient continues to remain in atrial fibrillation.  She is currently on 200 mg of amiodarone, 125 of digoxin, and 240 mg of diltiazem.  She is currently not on anticoagulation.  As such I do not think amiodarone is a great drug for her (1) because it is not stabilizing her out of AFib and (2) due to risk of conversion and potentially precipitating another stroke as she is un-anticoagulated. I will discontinue amiodarone.  2. Her echocardiogram has shown left ventricular hypertrophy.  This is likely hypertensive heart disease however amyloidosis was mentioned on the differential.  She cannot get an MRI because she has metal in her body she says after recent hand surgery.  We will repeat an echocardiogram in 6 months to assess LVH progression.  In either case I will go ahead and stop her digoxin if it indeed turns out to be amyloid.   3. To compensate for stopping the amiodarone and digoxin I will  increase her diltiazem to 360 mg daily.  I will get a 14-day cardiac monitor to assess heart rate control.  4. She is currently only on aspirin 81 mg daily.  She is scheduled to see her neurologist end of December.  I recommend anticoagulation with DOAC if okay with the neurology teams.  5. Follow-up with advanced practice provider in 1 month after she sees neurology to make a decision on anticoagulation. If she is deemed to not be a candidate for anticoagulation per neurology due to bleeding risks, then I recommend that the advanced practice provider refer her for consideration of watchman device.  6. Can see me back in 6 months with an echo after her acute active current issues are stabilized.   7. PCP follow up for HTN management. First BP was high, repeat check was down to 150 systolic. Increase in diltiazem will help.               Thank you for allowing me to participate in the care of your patient.    Sincerely,     Nargis Bonds MD     [facility]

## 2018-12-10 NOTE — PATIENT INSTRUCTIONS
"[facility] HEART CARE  [facility]~[address]~[place], [place]~[address]  Thank you for your [facility] visit today. If you have questions regarding your visit, please contact your cardiology RN's, Debbie Hung or Nichol Lopez, at [phone]. Your provider has recommended the following:    Medication Changes:  No Medication Changes at your Heart Care appointment today 12/10/2018    Recommendations:  Schedule [facility]    Follow-up:  See LARRY for cardiology follow up in 1 month-test results.    To schedule a future appointment, we kindly ask that you call cardiology scheduling at [phone] three months prior to requested revisit date  [facility] cardiology clinic is staffed with \"Advance Practice Providers\". These are our cardiology Physician Assistants and Nurse Practitioners. Please call cardiology scheduling if you feel you need clinical evaluation with them at any time for any cardiac reason.                 Reminder:  For your safety, we ask that you bring in your current medication(s) or an updated list of your medications with you to EACH office visit. Include the medication name, dose of pill on bottle and how you are taking it. Include over-the-counter medications or supplements. Your provider will review this at each visit and plan your care based on your current information.   "

## 2018-12-12 ENCOUNTER — HOSPITAL ENCOUNTER (OUTPATIENT)
Dept: CT IMAGING | Facility: HOSPITAL | Age: 73
Discharge: HOME | End: 2018-12-12

## 2018-12-12 ENCOUNTER — NURSING HOME VISIT (OUTPATIENT)
Dept: GERIATRICS | Facility: CLINIC | Age: 73
End: 2018-12-12
Payer: MEDICARE

## 2018-12-12 VITALS
SYSTOLIC BLOOD PRESSURE: 106 MMHG | WEIGHT: 115.4 LBS | RESPIRATION RATE: 16 BRPM | TEMPERATURE: 97.8 F | OXYGEN SATURATION: 97 % | HEART RATE: 87 BPM | DIASTOLIC BLOOD PRESSURE: 75 MMHG

## 2018-12-12 DIAGNOSIS — K59.04 CHRONIC IDIOPATHIC CONSTIPATION: ICD-10-CM

## 2018-12-12 DIAGNOSIS — I62.9 INTRACRANIAL BLEED (H): ICD-10-CM

## 2018-12-12 DIAGNOSIS — I62.9 INTRACRANIAL BLEED (H): Primary | ICD-10-CM

## 2018-12-12 DIAGNOSIS — R41.89 COGNITIVE IMPAIRMENT: ICD-10-CM

## 2018-12-12 DIAGNOSIS — R41.4 HEMI-NEGLECT OF RIGHT SIDE: ICD-10-CM

## 2018-12-12 DIAGNOSIS — S62.101A WRIST FRACTURE, RIGHT, CLOSED, INITIAL ENCOUNTER: ICD-10-CM

## 2018-12-12 DIAGNOSIS — I63.422 CEREBROVASCULAR ACCIDENT (CVA) DUE TO EMBOLISM OF LEFT ANTERIOR CEREBRAL ARTERY (H): ICD-10-CM

## 2018-12-12 DIAGNOSIS — I48.91 ATRIAL FIBRILLATION WITH RAPID VENTRICULAR RESPONSE (H): ICD-10-CM

## 2018-12-12 PROCEDURE — 99309 SBSQ NF CARE MODERATE MDM 30: CPT | Performed by: NURSE PRACTITIONER
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
## 2018-12-13 PROBLEM — K59.04 CHRONIC IDIOPATHIC CONSTIPATION: Status: ACTIVE | Noted: 2018-12-13

## 2018-12-13 RX ORDER — POLYETHYLENE GLYCOL 3350 17 G/17G
1 POWDER, FOR SOLUTION ORAL DAILY
COMMUNITY
End: 2019-02-03

## 2019-01-01 ENCOUNTER — NURSING HOME VISIT (OUTPATIENT)
Dept: GERIATRICS | Facility: CLINIC | Age: 74
End: 2019-01-01
Payer: COMMERCIAL

## 2019-01-01 ENCOUNTER — HOSPITAL ENCOUNTER (INPATIENT)
Facility: CLINIC | Age: 74
LOS: 5 days | Discharge: NURSING, REHAB OR LONG-TERM CARE | DRG: 309 | End: 2019-04-18
Attending: EMERGENCY MEDICINE | Admitting: FAMILY MEDICINE
Payer: COMMERCIAL

## 2019-01-01 ENCOUNTER — DOCUMENTATION ONLY (OUTPATIENT)
Dept: OTHER | Facility: CLINIC | Age: 74
End: 2019-01-01

## 2019-01-01 ENCOUNTER — AMBULATORY - HEALTHEAST (OUTPATIENT)
Dept: OTHER | Facility: CLINIC | Age: 74
End: 2019-01-01

## 2019-01-01 ENCOUNTER — RECORDS - HEALTHEAST (OUTPATIENT)
Dept: LAB | Facility: CLINIC | Age: 74
End: 2019-01-01

## 2019-01-01 ENCOUNTER — APPOINTMENT (OUTPATIENT)
Dept: CT IMAGING | Facility: CLINIC | Age: 74
DRG: 065 | End: 2019-01-01
Attending: FAMILY MEDICINE
Payer: COMMERCIAL

## 2019-01-01 ENCOUNTER — APPOINTMENT (OUTPATIENT)
Dept: CT IMAGING | Facility: CLINIC | Age: 74
DRG: 309 | End: 2019-01-01
Attending: EMERGENCY MEDICINE
Payer: COMMERCIAL

## 2019-01-01 ENCOUNTER — TELEPHONE (OUTPATIENT)
Dept: GERIATRICS | Facility: CLINIC | Age: 74
End: 2019-01-01

## 2019-01-01 ENCOUNTER — APPOINTMENT (OUTPATIENT)
Dept: GENERAL RADIOLOGY | Facility: CLINIC | Age: 74
DRG: 309 | End: 2019-01-01
Attending: EMERGENCY MEDICINE
Payer: COMMERCIAL

## 2019-01-01 ENCOUNTER — COMMUNICATION - HEALTHEAST (OUTPATIENT)
Dept: NEUROSURGERY | Facility: CLINIC | Age: 74
End: 2019-01-01

## 2019-01-01 ENCOUNTER — APPOINTMENT (OUTPATIENT)
Dept: SPEECH THERAPY | Facility: CLINIC | Age: 74
DRG: 065 | End: 2019-01-01
Payer: COMMERCIAL

## 2019-01-01 ENCOUNTER — APPOINTMENT (OUTPATIENT)
Dept: CT IMAGING | Facility: CLINIC | Age: 74
DRG: 065 | End: 2019-01-01
Attending: INTERNAL MEDICINE
Payer: COMMERCIAL

## 2019-01-01 ENCOUNTER — TRANSFERRED RECORDS (OUTPATIENT)
Dept: HEALTH INFORMATION MANAGEMENT | Facility: CLINIC | Age: 74
End: 2019-01-01

## 2019-01-01 ENCOUNTER — HOSPITAL ENCOUNTER (OUTPATIENT)
Facility: CLINIC | Age: 74
Setting detail: OBSERVATION
Discharge: NURSING, REHAB OR LONG-TERM CARE | End: 2019-11-11
Attending: FAMILY MEDICINE | Admitting: INTERNAL MEDICINE
Payer: COMMERCIAL

## 2019-01-01 ENCOUNTER — HOSPITAL ENCOUNTER (INPATIENT)
Facility: CLINIC | Age: 74
LOS: 3 days | Discharge: NURSING, REHAB OR LONG-TERM CARE | DRG: 065 | End: 2019-03-12
Attending: FAMILY MEDICINE | Admitting: FAMILY MEDICINE
Payer: COMMERCIAL

## 2019-01-01 ENCOUNTER — APPOINTMENT (OUTPATIENT)
Dept: CT IMAGING | Facility: CLINIC | Age: 74
End: 2019-01-01
Attending: FAMILY MEDICINE
Payer: COMMERCIAL

## 2019-01-01 VITALS
BODY MASS INDEX: 19.86 KG/M2 | SYSTOLIC BLOOD PRESSURE: 166 MMHG | HEIGHT: 67 IN | RESPIRATION RATE: 18 BRPM | HEART RATE: 72 BPM | WEIGHT: 126.54 LBS | DIASTOLIC BLOOD PRESSURE: 96 MMHG | TEMPERATURE: 98.7 F | OXYGEN SATURATION: 94 %

## 2019-01-01 VITALS
HEART RATE: 61 BPM | TEMPERATURE: 98.6 F | WEIGHT: 121.4 LBS | DIASTOLIC BLOOD PRESSURE: 84 MMHG | SYSTOLIC BLOOD PRESSURE: 146 MMHG | OXYGEN SATURATION: 100 % | BODY MASS INDEX: 19.01 KG/M2 | RESPIRATION RATE: 20 BRPM

## 2019-01-01 VITALS
TEMPERATURE: 97.3 F | RESPIRATION RATE: 16 BRPM | OXYGEN SATURATION: 99 % | SYSTOLIC BLOOD PRESSURE: 167 MMHG | HEART RATE: 74 BPM | DIASTOLIC BLOOD PRESSURE: 100 MMHG | WEIGHT: 118.8 LBS | BODY MASS INDEX: 18.61 KG/M2

## 2019-01-01 VITALS
OXYGEN SATURATION: 96 % | WEIGHT: 125 LBS | RESPIRATION RATE: 16 BRPM | DIASTOLIC BLOOD PRESSURE: 120 MMHG | SYSTOLIC BLOOD PRESSURE: 180 MMHG | TEMPERATURE: 96.7 F | HEART RATE: 120 BPM | BODY MASS INDEX: 19.58 KG/M2

## 2019-01-01 VITALS
WEIGHT: 129.4 LBS | SYSTOLIC BLOOD PRESSURE: 120 MMHG | HEART RATE: 78 BPM | OXYGEN SATURATION: 98 % | DIASTOLIC BLOOD PRESSURE: 84 MMHG | BODY MASS INDEX: 20.27 KG/M2 | RESPIRATION RATE: 18 BRPM | TEMPERATURE: 97.1 F

## 2019-01-01 VITALS
DIASTOLIC BLOOD PRESSURE: 76 MMHG | BODY MASS INDEX: 19.42 KG/M2 | WEIGHT: 124 LBS | TEMPERATURE: 98 F | OXYGEN SATURATION: 97 % | HEART RATE: 109 BPM | RESPIRATION RATE: 22 BRPM | SYSTOLIC BLOOD PRESSURE: 153 MMHG

## 2019-01-01 VITALS
BODY MASS INDEX: 23.9 KG/M2 | TEMPERATURE: 98.1 F | HEIGHT: 62 IN | SYSTOLIC BLOOD PRESSURE: 128 MMHG | HEART RATE: 75 BPM | WEIGHT: 129.85 LBS | OXYGEN SATURATION: 97 % | RESPIRATION RATE: 5 BRPM | DIASTOLIC BLOOD PRESSURE: 92 MMHG

## 2019-01-01 VITALS
BODY MASS INDEX: 19.79 KG/M2 | DIASTOLIC BLOOD PRESSURE: 72 MMHG | RESPIRATION RATE: 16 BRPM | SYSTOLIC BLOOD PRESSURE: 115 MMHG | WEIGHT: 126.1 LBS | HEART RATE: 47 BPM | OXYGEN SATURATION: 96 % | TEMPERATURE: 97.9 F | HEIGHT: 67 IN

## 2019-01-01 VITALS
SYSTOLIC BLOOD PRESSURE: 125 MMHG | WEIGHT: 128 LBS | BODY MASS INDEX: 20.05 KG/M2 | HEART RATE: 58 BPM | RESPIRATION RATE: 16 BRPM | TEMPERATURE: 96.2 F | DIASTOLIC BLOOD PRESSURE: 77 MMHG | OXYGEN SATURATION: 99 %

## 2019-01-01 VITALS
RESPIRATION RATE: 16 BRPM | OXYGEN SATURATION: 98 % | WEIGHT: 124.2 LBS | SYSTOLIC BLOOD PRESSURE: 153 MMHG | HEART RATE: 83 BPM | TEMPERATURE: 96.5 F | DIASTOLIC BLOOD PRESSURE: 103 MMHG | BODY MASS INDEX: 19.45 KG/M2

## 2019-01-01 VITALS
TEMPERATURE: 96.4 F | HEART RATE: 79 BPM | WEIGHT: 121 LBS | DIASTOLIC BLOOD PRESSURE: 112 MMHG | SYSTOLIC BLOOD PRESSURE: 159 MMHG | RESPIRATION RATE: 16 BRPM | BODY MASS INDEX: 22.13 KG/M2 | OXYGEN SATURATION: 97 %

## 2019-01-01 VITALS
RESPIRATION RATE: 16 BRPM | TEMPERATURE: 97.1 F | WEIGHT: 116.8 LBS | OXYGEN SATURATION: 95 % | HEIGHT: 67 IN | DIASTOLIC BLOOD PRESSURE: 108 MMHG | BODY MASS INDEX: 18.33 KG/M2 | SYSTOLIC BLOOD PRESSURE: 145 MMHG | HEART RATE: 130 BPM

## 2019-01-01 VITALS
SYSTOLIC BLOOD PRESSURE: 131 MMHG | RESPIRATION RATE: 16 BRPM | WEIGHT: 115.8 LBS | BODY MASS INDEX: 18.14 KG/M2 | DIASTOLIC BLOOD PRESSURE: 90 MMHG | TEMPERATURE: 98.1 F | HEART RATE: 86 BPM | OXYGEN SATURATION: 92 %

## 2019-01-01 VITALS
OXYGEN SATURATION: 98 % | HEART RATE: 66 BPM | SYSTOLIC BLOOD PRESSURE: 139 MMHG | BODY MASS INDEX: 19.42 KG/M2 | WEIGHT: 124 LBS | DIASTOLIC BLOOD PRESSURE: 87 MMHG | RESPIRATION RATE: 16 BRPM | TEMPERATURE: 97.8 F

## 2019-01-01 VITALS
HEART RATE: 98 BPM | DIASTOLIC BLOOD PRESSURE: 93 MMHG | RESPIRATION RATE: 18 BRPM | OXYGEN SATURATION: 99 % | BODY MASS INDEX: 19.62 KG/M2 | WEIGHT: 125 LBS | SYSTOLIC BLOOD PRESSURE: 127 MMHG | HEIGHT: 67 IN | TEMPERATURE: 97.7 F

## 2019-01-01 VITALS
HEART RATE: 72 BPM | SYSTOLIC BLOOD PRESSURE: 99 MMHG | WEIGHT: 122.8 LBS | DIASTOLIC BLOOD PRESSURE: 72 MMHG | RESPIRATION RATE: 16 BRPM | TEMPERATURE: 97.6 F | OXYGEN SATURATION: 96 % | HEIGHT: 67 IN | BODY MASS INDEX: 19.27 KG/M2

## 2019-01-01 VITALS
SYSTOLIC BLOOD PRESSURE: 153 MMHG | WEIGHT: 125 LBS | BODY MASS INDEX: 19.58 KG/M2 | OXYGEN SATURATION: 100 % | TEMPERATURE: 97.6 F | RESPIRATION RATE: 16 BRPM | DIASTOLIC BLOOD PRESSURE: 102 MMHG | HEART RATE: 94 BPM

## 2019-01-01 VITALS
WEIGHT: 121.47 LBS | RESPIRATION RATE: 18 BRPM | SYSTOLIC BLOOD PRESSURE: 132 MMHG | HEIGHT: 67 IN | TEMPERATURE: 98.2 F | BODY MASS INDEX: 19.07 KG/M2 | HEART RATE: 44 BPM | OXYGEN SATURATION: 98 % | DIASTOLIC BLOOD PRESSURE: 86 MMHG

## 2019-01-01 VITALS
OXYGEN SATURATION: 96 % | RESPIRATION RATE: 16 BRPM | HEART RATE: 92 BPM | BODY MASS INDEX: 19.7 KG/M2 | TEMPERATURE: 97.8 F | DIASTOLIC BLOOD PRESSURE: 91 MMHG | SYSTOLIC BLOOD PRESSURE: 157 MMHG | WEIGHT: 125.8 LBS

## 2019-01-01 VITALS
SYSTOLIC BLOOD PRESSURE: 131 MMHG | WEIGHT: 126.4 LBS | HEART RATE: 60 BPM | DIASTOLIC BLOOD PRESSURE: 91 MMHG | RESPIRATION RATE: 16 BRPM | OXYGEN SATURATION: 98 % | BODY MASS INDEX: 19.8 KG/M2 | TEMPERATURE: 97.8 F

## 2019-01-01 DIAGNOSIS — I11.9 HYPERTENSIVE HEART DISEASE WITHOUT HEART FAILURE: ICD-10-CM

## 2019-01-01 DIAGNOSIS — I48.20 CHRONIC ATRIAL FIBRILLATION (H): ICD-10-CM

## 2019-01-01 DIAGNOSIS — I63.422 CEREBROVASCULAR ACCIDENT (CVA) DUE TO EMBOLISM OF LEFT ANTERIOR CEREBRAL ARTERY (H): ICD-10-CM

## 2019-01-01 DIAGNOSIS — R41.89 COGNITIVE IMPAIRMENT: ICD-10-CM

## 2019-01-01 DIAGNOSIS — R52 PAIN: ICD-10-CM

## 2019-01-01 DIAGNOSIS — F03.90 DEMENTIA WITHOUT BEHAVIORAL DISTURBANCE, UNSPECIFIED DEMENTIA TYPE: ICD-10-CM

## 2019-01-01 DIAGNOSIS — I11.9 HYPERTENSIVE HEART DISEASE WITHOUT HEART FAILURE: Primary | ICD-10-CM

## 2019-01-01 DIAGNOSIS — K92.2 GASTROINTESTINAL HEMORRHAGE, UNSPECIFIED GASTROINTESTINAL HEMORRHAGE TYPE: ICD-10-CM

## 2019-01-01 DIAGNOSIS — R31.0 GROSS HEMATURIA: ICD-10-CM

## 2019-01-01 DIAGNOSIS — I10 POORLY-CONTROLLED HYPERTENSION: ICD-10-CM

## 2019-01-01 DIAGNOSIS — I71.40 ABDOMINAL AORTIC ANEURYSM (AAA) WITHOUT RUPTURE (H): ICD-10-CM

## 2019-01-01 DIAGNOSIS — Z51.5 HOSPICE CARE PATIENT: ICD-10-CM

## 2019-01-01 DIAGNOSIS — G31.84 MILD COGNITIVE IMPAIRMENT: ICD-10-CM

## 2019-01-01 DIAGNOSIS — I10 ESSENTIAL HYPERTENSION: ICD-10-CM

## 2019-01-01 DIAGNOSIS — R11.0 NAUSEA: ICD-10-CM

## 2019-01-01 DIAGNOSIS — R11.2 NAUSEA AND VOMITING, INTRACTABILITY OF VOMITING NOT SPECIFIED, UNSPECIFIED VOMITING TYPE: ICD-10-CM

## 2019-01-01 DIAGNOSIS — I21.4 NSTEMI (NON-ST ELEVATED MYOCARDIAL INFARCTION) (H): ICD-10-CM

## 2019-01-01 DIAGNOSIS — S62.101A WRIST FRACTURE, RIGHT, CLOSED, INITIAL ENCOUNTER: ICD-10-CM

## 2019-01-01 DIAGNOSIS — R47.01 EXPRESSIVE APHASIA: ICD-10-CM

## 2019-01-01 DIAGNOSIS — I21.4 NSTEMI (NON-ST ELEVATED MYOCARDIAL INFARCTION) (H): Primary | ICD-10-CM

## 2019-01-01 DIAGNOSIS — Z79.01 LONG TERM CURRENT USE OF ANTICOAGULANT THERAPY: ICD-10-CM

## 2019-01-01 DIAGNOSIS — I50.42 CHRONIC COMBINED SYSTOLIC AND DIASTOLIC CONGESTIVE HEART FAILURE (H): ICD-10-CM

## 2019-01-01 DIAGNOSIS — I10 ESSENTIAL HYPERTENSION, MALIGNANT: ICD-10-CM

## 2019-01-01 DIAGNOSIS — Z79.01 LONG TERM (CURRENT) USE OF ANTICOAGULANTS: ICD-10-CM

## 2019-01-01 DIAGNOSIS — I65.23 CAROTID STENOSIS, BILATERAL: ICD-10-CM

## 2019-01-01 DIAGNOSIS — K21.00 GASTROESOPHAGEAL REFLUX DISEASE WITH ESOPHAGITIS: ICD-10-CM

## 2019-01-01 DIAGNOSIS — D72.823 LEUKEMOID REACTION: ICD-10-CM

## 2019-01-01 DIAGNOSIS — I63.422 CEREBROVASCULAR ACCIDENT (CVA) DUE TO EMBOLISM OF LEFT ANTERIOR CEREBRAL ARTERY (H): Primary | ICD-10-CM

## 2019-01-01 DIAGNOSIS — E86.0 DEHYDRATION: ICD-10-CM

## 2019-01-01 DIAGNOSIS — I10 ESSENTIAL HYPERTENSION: Primary | ICD-10-CM

## 2019-01-01 DIAGNOSIS — Z91.81 PERSONAL HISTORY OF FALL: ICD-10-CM

## 2019-01-01 DIAGNOSIS — F22 PARANOIA (H): ICD-10-CM

## 2019-01-01 DIAGNOSIS — I50.42 CHRONIC COMBINED SYSTOLIC AND DIASTOLIC CONGESTIVE HEART FAILURE (H): Primary | ICD-10-CM

## 2019-01-01 DIAGNOSIS — F03.91 DEMENTIA WITH BEHAVIORAL DISTURBANCE, UNSPECIFIED DEMENTIA TYPE: ICD-10-CM

## 2019-01-01 DIAGNOSIS — I48.91 ATRIAL FIBRILLATION WITH RVR (H): ICD-10-CM

## 2019-01-01 DIAGNOSIS — M79.632 PAIN OF LEFT FOREARM: ICD-10-CM

## 2019-01-01 DIAGNOSIS — I48.0 PAROXYSMAL ATRIAL FIBRILLATION (H): ICD-10-CM

## 2019-01-01 DIAGNOSIS — R19.7 DIARRHEA, UNSPECIFIED TYPE: ICD-10-CM

## 2019-01-01 DIAGNOSIS — R29.6 FREQUENT FALLS: ICD-10-CM

## 2019-01-01 DIAGNOSIS — R54 FRAIL ELDERLY: ICD-10-CM

## 2019-01-01 DIAGNOSIS — R29.6 FREQUENT FALLS: Primary | ICD-10-CM

## 2019-01-01 DIAGNOSIS — Z79.899 POLYPHARMACY: ICD-10-CM

## 2019-01-01 DIAGNOSIS — R07.9 CHEST PAIN, UNSPECIFIED TYPE: ICD-10-CM

## 2019-01-01 DIAGNOSIS — I48.91 RAPID ATRIAL FIBRILLATION (H): ICD-10-CM

## 2019-01-01 DIAGNOSIS — I65.23 CAROTID STENOSIS, ASYMPTOMATIC, BILATERAL: ICD-10-CM

## 2019-01-01 DIAGNOSIS — I48.20 CHRONIC ATRIAL FIBRILLATION (H): Primary | ICD-10-CM

## 2019-01-01 DIAGNOSIS — R91.8 X-RAY OF LUNG, ABNORMAL: ICD-10-CM

## 2019-01-01 DIAGNOSIS — Z86.73 HISTORY OF EMBOLIC STROKE: ICD-10-CM

## 2019-01-01 DIAGNOSIS — Z86.73 HISTORY OF EMBOLIC STROKE: Primary | ICD-10-CM

## 2019-01-01 DIAGNOSIS — I48.91 ATRIAL FIBRILLATION, UNSPECIFIED TYPE (H): ICD-10-CM
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
## 2019-01-01 LAB
ALBUMIN SERPL-MCNC: 3.3 G/DL (ref 3.5–5)
ALBUMIN SERPL-MCNC: 3.8 G/DL (ref 3.4–5)
ALBUMIN SERPL-MCNC: 3.9 G/DL (ref 3.4–5)
ALBUMIN UR-MCNC: 30 MG/DL
ALBUMIN UR-MCNC: ABNORMAL MG/DL
ALBUMIN UR-MCNC: NEGATIVE MG/DL
ALP SERPL-CCNC: 109 U/L (ref 40–150)
ALP SERPL-CCNC: 115 U/L (ref 40–150)
ALP SERPL-CCNC: 84 U/L (ref 45–120)
ALT SERPL W P-5'-P-CCNC: 12 U/L (ref 0–45)
ALT SERPL W P-5'-P-CCNC: 25 U/L (ref 0–50)
ALT SERPL W P-5'-P-CCNC: 33 U/L (ref 0–50)
AMORPH CRY #/AREA URNS HPF: ABNORMAL /[HPF]
ANION GAP SERPL CALCULATED.3IONS-SCNC: 10 MMOL/L (ref 5–18)
ANION GAP SERPL CALCULATED.3IONS-SCNC: 10 MMOL/L (ref 5–18)
ANION GAP SERPL CALCULATED.3IONS-SCNC: 12 MMOL/L (ref 3–14)
ANION GAP SERPL CALCULATED.3IONS-SCNC: 4 MMOL/L (ref 3–14)
ANION GAP SERPL CALCULATED.3IONS-SCNC: 5 MMOL/L (ref 3–14)
ANION GAP SERPL CALCULATED.3IONS-SCNC: 5 MMOL/L (ref 3–14)
ANION GAP SERPL CALCULATED.3IONS-SCNC: 7 MMOL/L (ref 3–14)
ANION GAP SERPL CALCULATED.3IONS-SCNC: 7 MMOL/L (ref 5–18)
ANION GAP SERPL CALCULATED.3IONS-SCNC: 8 MMOL/L (ref 3–14)
APPEARANCE UR: ABNORMAL
APPEARANCE UR: CLEAR
APPEARANCE UR: CLEAR
AST SERPL W P-5'-P-CCNC: 20 U/L (ref 0–45)
AST SERPL W P-5'-P-CCNC: 23 U/L (ref 0–45)
AST SERPL W P-5'-P-CCNC: 24 U/L (ref 0–40)
BACTERIA #/AREA URNS HPF: ABNORMAL HPF
BACTERIA SPEC CULT: NO GROWTH
BACTERIA SPEC CULT: NORMAL
BASOPHILS # BLD AUTO: 0 10E9/L (ref 0–0.2)
BASOPHILS # BLD AUTO: 0.1 10E9/L (ref 0–0.2)
BASOPHILS # BLD AUTO: 0.1 THOU/UL (ref 0–0.2)
BASOPHILS NFR BLD AUTO: 0.5 %
BASOPHILS NFR BLD AUTO: 0.5 %
BASOPHILS NFR BLD AUTO: 0.9 %
BASOPHILS NFR BLD AUTO: 1 %
BASOPHILS NFR BLD AUTO: 1 % (ref 0–2)
BILIRUB SERPL-MCNC: 0.6 MG/DL (ref 0.2–1.3)
BILIRUB SERPL-MCNC: 0.7 MG/DL (ref 0–1)
BILIRUB SERPL-MCNC: 1 MG/DL (ref 0.2–1.3)
BILIRUB UR QL STRIP: NEGATIVE
BNP SERPL-MCNC: 500 PG/ML (ref 0–133)
BUN SERPL-MCNC: 14 MG/DL (ref 8–28)
BUN SERPL-MCNC: 15 MG/DL (ref 7–30)
BUN SERPL-MCNC: 19 MG/DL (ref 7–30)
BUN SERPL-MCNC: 19 MG/DL (ref 7–30)
BUN SERPL-MCNC: 20 MG/DL (ref 8–28)
BUN SERPL-MCNC: 24 MG/DL (ref 8–28)
BUN SERPL-MCNC: 27 MG/DL (ref 7–30)
BUN SERPL-MCNC: 27 MG/DL (ref 7–30)
BUN SERPL-MCNC: 34 MG/DL (ref 8–28)
BUN SERPL-MCNC: 34 MG/DL (ref 8–28)
BUN SERPL-MCNC: 35 MG/DL (ref 7–30)
CALCIUM SERPL-MCNC: 10.4 MG/DL (ref 8.5–10.5)
CALCIUM SERPL-MCNC: 8.3 MG/DL (ref 8.5–10.1)
CALCIUM SERPL-MCNC: 8.3 MG/DL (ref 8.5–10.1)
CALCIUM SERPL-MCNC: 8.6 MG/DL (ref 8.5–10.1)
CALCIUM SERPL-MCNC: 9 MG/DL (ref 8.5–10.1)
CALCIUM SERPL-MCNC: 9.1 MG/DL (ref 8.5–10.5)
CALCIUM SERPL-MCNC: 9.1 MG/DL (ref 8.5–10.5)
CALCIUM SERPL-MCNC: 9.4 MG/DL (ref 8.5–10.5)
CALCIUM SERPL-MCNC: 9.6 MG/DL (ref 8.5–10.1)
CALCIUM SERPL-MCNC: 9.7 MG/DL (ref 8.5–10.1)
CALCIUM SERPL-MCNC: 9.9 MG/DL (ref 8.5–10.5)
CHLORIDE BLD-SCNC: 101 MMOL/L (ref 98–107)
CHLORIDE BLD-SCNC: 103 MMOL/L (ref 98–107)
CHLORIDE BLD-SCNC: 106 MMOL/L (ref 98–107)
CHLORIDE BLD-SCNC: 106 MMOL/L (ref 98–107)
CHLORIDE SERPL-SCNC: 104 MMOL/L (ref 94–109)
CHLORIDE SERPL-SCNC: 106 MMOL/L (ref 94–109)
CHLORIDE SERPL-SCNC: 107 MMOL/L (ref 94–109)
CHLORIDE SERPL-SCNC: 107 MMOL/L (ref 94–109)
CHLORIDE SERPL-SCNC: 108 MMOL/L (ref 94–109)
CHLORIDE SERPL-SCNC: 110 MMOL/L (ref 94–109)
CHLORIDE SERPLBLD-SCNC: 103 MMOL/L (ref 98–107)
CO2 SERPL-SCNC: 19 MMOL/L (ref 20–32)
CO2 SERPL-SCNC: 23 MMOL/L (ref 20–32)
CO2 SERPL-SCNC: 23 MMOL/L (ref 22–31)
CO2 SERPL-SCNC: 25 MMOL/L (ref 20–32)
CO2 SERPL-SCNC: 26 MMOL/L (ref 22–31)
CO2 SERPL-SCNC: 26 MMOL/L (ref 22–31)
CO2 SERPL-SCNC: 27 MMOL/L (ref 20–32)
CO2 SERPL-SCNC: 27 MMOL/L (ref 22–31)
CO2 SERPL-SCNC: 28 MMOL/L (ref 22–31)
COLOR UR AUTO: ABNORMAL
COLOR UR AUTO: YELLOW
COLOR UR AUTO: YELLOW
CREAT SERPL-MCNC: 0.77 MG/DL (ref 0.52–1.04)
CREAT SERPL-MCNC: 0.78 MG/DL (ref 0.52–1.04)
CREAT SERPL-MCNC: 0.8 MG/DL (ref 0.52–1.04)
CREAT SERPL-MCNC: 0.8 MG/DL (ref 0.6–1.1)
CREAT SERPL-MCNC: 0.83 MG/DL (ref 0.6–1.1)
CREAT SERPL-MCNC: 0.84 MG/DL (ref 0.6–1.1)
CREAT SERPL-MCNC: 0.84 MG/DL (ref 0.6–1.1)
CREAT SERPL-MCNC: 0.87 MG/DL (ref 0.52–1.04)
CREAT SERPL-MCNC: 0.91 MG/DL (ref 0.6–1.1)
CREAT SERPL-MCNC: 0.92 MG/DL (ref 0.52–1.04)
CREAT SERPL-MCNC: 1.06 MG/DL (ref 0.52–1.04)
CREAT SERPL-MCNC: 1.19 MG/DL (ref 0.52–1.04)
DIFFERENTIAL METHOD BLD: ABNORMAL
DIFFERENTIAL METHOD BLD: ABNORMAL
DIFFERENTIAL METHOD BLD: NORMAL
DIFFERENTIAL METHOD BLD: NORMAL
DIFFERENTIAL: ABNORMAL
DIGOXIN LEVEL LHE- HISTORICAL: <0.3 NG/ML (ref 0.5–2)
DIGOXIN SERPL-MCNC: 0.4 UG/L (ref 0.5–2)
EOSINOPHIL # BLD AUTO: 0.1 10E9/L (ref 0–0.7)
EOSINOPHIL # BLD AUTO: 0.2 10E9/L (ref 0–0.7)
EOSINOPHIL # BLD AUTO: 0.3 10E9/L (ref 0–0.7)
EOSINOPHIL # BLD AUTO: 0.3 10E9/L (ref 0–0.7)
EOSINOPHIL # BLD AUTO: 0.4 THOU/UL (ref 0–0.4)
EOSINOPHIL NFR BLD AUTO: 0.4 %
EOSINOPHIL NFR BLD AUTO: 2.5 %
EOSINOPHIL NFR BLD AUTO: 3.3 %
EOSINOPHIL NFR BLD AUTO: 3.6 %
EOSINOPHIL NFR BLD AUTO: 5 % (ref 0–6)
ERYTHROCYTE [DISTWIDTH] IN BLOOD BY AUTOMATED COUNT: 12.6 % (ref 10–15)
ERYTHROCYTE [DISTWIDTH] IN BLOOD BY AUTOMATED COUNT: 13.2 % (ref 11–14.5)
ERYTHROCYTE [DISTWIDTH] IN BLOOD BY AUTOMATED COUNT: 13.6 % (ref 10–15)
ERYTHROCYTE [DISTWIDTH] IN BLOOD BY AUTOMATED COUNT: 13.7 % (ref 10–15)
ERYTHROCYTE [DISTWIDTH] IN BLOOD BY AUTOMATED COUNT: 13.7 % (ref 10–15)
ERYTHROCYTE [DISTWIDTH] IN BLOOD BY AUTOMATED COUNT: 13.9 % (ref 10–15)
ERYTHROCYTE [DISTWIDTH] IN BLOOD BY AUTOMATED COUNT: 14 % (ref 10–15)
ERYTHROCYTE [DISTWIDTH] IN BLOOD BY AUTOMATED COUNT: 14.2 % (ref 11–14.5)
ERYTHROCYTE [DISTWIDTH] IN BLOOD BY AUTOMATED COUNT: 14.2 % (ref 11–14.5)
FLUAV+FLUBV AG SPEC QL: NEGATIVE
FLUAV+FLUBV AG SPEC QL: NEGATIVE
GFR SERPL CREATININE-BSD FRML MDRD: 45 ML/MIN/{1.73_M2}
GFR SERPL CREATININE-BSD FRML MDRD: 52 ML/MIN/{1.73_M2}
GFR SERPL CREATININE-BSD FRML MDRD: 60 ML/MIN/1.73M2
GFR SERPL CREATININE-BSD FRML MDRD: 61 ML/MIN/{1.73_M2}
GFR SERPL CREATININE-BSD FRML MDRD: 66 ML/MIN/{1.73_M2}
GFR SERPL CREATININE-BSD FRML MDRD: 72 ML/MIN/{1.73_M2}
GFR SERPL CREATININE-BSD FRML MDRD: 74 ML/MIN/{1.73_M2}
GFR SERPL CREATININE-BSD FRML MDRD: 76 ML/MIN/{1.73_M2}
GFR SERPL CREATININE-BSD FRML MDRD: >60 ML/MIN/1.73M2
GLUCOSE BLD-MCNC: 101 MG/DL (ref 70–125)
GLUCOSE BLD-MCNC: 88 MG/DL (ref 70–125)
GLUCOSE BLD-MCNC: 92 MG/DL (ref 70–125)
GLUCOSE BLD-MCNC: 99 MG/DL (ref 70–125)
GLUCOSE BLDC GLUCOMTR-MCNC: 104 MG/DL (ref 70–99)
GLUCOSE BLDC GLUCOMTR-MCNC: 134 MG/DL (ref 70–99)
GLUCOSE BLDC GLUCOMTR-MCNC: 143 MG/DL (ref 70–99)
GLUCOSE SERPL-MCNC: 102 MG/DL (ref 70–99)
GLUCOSE SERPL-MCNC: 130 MG/DL (ref 70–99)
GLUCOSE SERPL-MCNC: 141 MG/DL (ref 70–99)
GLUCOSE SERPL-MCNC: 145 MG/DL (ref 70–99)
GLUCOSE SERPL-MCNC: 93 MG/DL (ref 70–99)
GLUCOSE SERPL-MCNC: 96 MG/DL (ref 70–99)
GLUCOSE SERPL-MCNC: 99 MG/DL (ref 70–125)
GLUCOSE UR STRIP-MCNC: NEGATIVE MG/DL
HBA1C MFR BLD: 6.3 % (ref 0–5.6)
HCT VFR BLD AUTO: 37.2 % (ref 35–47)
HCT VFR BLD AUTO: 39.8 % (ref 35–47)
HCT VFR BLD AUTO: 40.6 % (ref 35–47)
HCT VFR BLD AUTO: 41.7 % (ref 35–47)
HCT VFR BLD AUTO: 42.7 % (ref 35–47)
HCT VFR BLD AUTO: 42.7 % (ref 36–47)
HCT VFR BLD AUTO: 43 % (ref 35–47)
HCT VFR BLD AUTO: 46.2 % (ref 35–47)
HCT VFR BLD AUTO: 47.9 % (ref 35–47)
HEMOGLOBIN: 13.8 G/DL (ref 12–16)
HGB BLD-MCNC: 11.8 G/DL (ref 12–16)
HGB BLD-MCNC: 12.6 G/DL (ref 11.7–15.7)
HGB BLD-MCNC: 12.6 G/DL (ref 11.7–15.7)
HGB BLD-MCNC: 13.6 G/DL (ref 11.7–15.7)
HGB BLD-MCNC: 13.8 G/DL (ref 11.7–15.7)
HGB BLD-MCNC: 13.8 G/DL (ref 12–16)
HGB BLD-MCNC: 14.3 G/DL (ref 11.7–15.7)
HGB BLD-MCNC: 14.9 G/DL (ref 11.7–15.7)
HGB UR QL STRIP: ABNORMAL
HGB UR QL STRIP: NEGATIVE
HGB UR QL STRIP: NEGATIVE
HYALINE CASTS #/AREA URNS LPF: 3 /LPF (ref 0–2)
IMM GRANULOCYTES # BLD: 0 10E9/L (ref 0–0.4)
IMM GRANULOCYTES NFR BLD: 0.1 %
IMM GRANULOCYTES NFR BLD: 0.1 %
IMM GRANULOCYTES NFR BLD: 0.2 %
IMM GRANULOCYTES NFR BLD: 0.4 %
KETONES UR STRIP-MCNC: NEGATIVE MG/DL
LACTATE BLD-SCNC: 1.5 MMOL/L (ref 0.7–2)
LACTATE BLD-SCNC: 1.7 MMOL/L (ref 0.7–2)
LACTATE BLD-SCNC: 1.8 MMOL/L (ref 0.7–2)
LACTATE BLD-SCNC: 3.1 MMOL/L (ref 0.7–2)
LEUKOCYTE ESTERASE UR QL STRIP: NEGATIVE
LIPASE SERPL-CCNC: 60 U/L (ref 73–393)
LMWH PPP CHRO-ACNC: 0.14 IU/ML
LMWH PPP CHRO-ACNC: 0.2 IU/ML
LMWH PPP CHRO-ACNC: 0.22 IU/ML
LMWH PPP CHRO-ACNC: 0.26 IU/ML
LYMPHOCYTES # BLD AUTO: 1.2 10E9/L (ref 0.8–5.3)
LYMPHOCYTES # BLD AUTO: 1.4 10E9/L (ref 0.8–5.3)
LYMPHOCYTES # BLD AUTO: 1.9 THOU/UL (ref 0.8–4.4)
LYMPHOCYTES # BLD AUTO: 2.1 10E9/L (ref 0.8–5.3)
LYMPHOCYTES # BLD AUTO: 2.3 10E9/L (ref 0.8–5.3)
LYMPHOCYTES NFR BLD AUTO: 17.4 %
LYMPHOCYTES NFR BLD AUTO: 22 % (ref 20–40)
LYMPHOCYTES NFR BLD AUTO: 26.2 %
LYMPHOCYTES NFR BLD AUTO: 27.1 %
LYMPHOCYTES NFR BLD AUTO: 8.8 %
Lab: NORMAL
MAGNESIUM SERPL-MCNC: 2.1 MG/DL (ref 1.6–2.3)
MCH RBC QN AUTO: 29.6 PG (ref 26.5–33)
MCH RBC QN AUTO: 30 PG (ref 26.5–33)
MCH RBC QN AUTO: 30.3 PG (ref 26.5–33)
MCH RBC QN AUTO: 31.3 PG (ref 26.5–33)
MCH RBC QN AUTO: 31.6 PG (ref 26.5–33)
MCH RBC QN AUTO: 33.1 PG (ref 27–34)
MCH RBC QN AUTO: 33.3 PG (ref 26.5–33)
MCH RBC QN AUTO: 33.4 PG (ref 27–34)
MCH RBC QN AUTO: 33.4 PG (ref 27–34)
MCHC RBC AUTO-ENTMCNC: 29.9 G/DL (ref 31.5–36.5)
MCHC RBC AUTO-ENTMCNC: 31 G/DL (ref 31.5–36.5)
MCHC RBC AUTO-ENTMCNC: 31.6 G/DL (ref 31.5–36.5)
MCHC RBC AUTO-ENTMCNC: 31.7 G/DL (ref 31.5–36.5)
MCHC RBC AUTO-ENTMCNC: 31.7 G/DL (ref 32–36)
MCHC RBC AUTO-ENTMCNC: 32.3 G/DL (ref 31.5–36.5)
MCHC RBC AUTO-ENTMCNC: 32.3 G/DL (ref 32–36)
MCHC RBC AUTO-ENTMCNC: 32.3 G/DL (ref 32–36)
MCHC RBC AUTO-ENTMCNC: 33.1 G/DL (ref 31.5–36.5)
MCV RBC AUTO: 101 FL (ref 78–100)
MCV RBC AUTO: 103 FL (ref 80–100)
MCV RBC AUTO: 103 FL (ref 80–100)
MCV RBC AUTO: 104 FL (ref 80–100)
MCV RBC AUTO: 96 FL (ref 78–100)
MCV RBC AUTO: 97 FL (ref 78–100)
MCV RBC AUTO: 98 FL (ref 78–100)
MCV RBC AUTO: 99 FL (ref 78–100)
MCV RBC AUTO: 99 FL (ref 78–100)
MONOCYTES # BLD AUTO: 0.5 10E9/L (ref 0–1.3)
MONOCYTES # BLD AUTO: 0.5 10E9/L (ref 0–1.3)
MONOCYTES # BLD AUTO: 0.6 10E9/L (ref 0–1.3)
MONOCYTES # BLD AUTO: 0.7 10E9/L (ref 0–1.3)
MONOCYTES # BLD AUTO: 0.9 THOU/UL (ref 0–0.9)
MONOCYTES NFR BLD AUTO: 11 % (ref 2–10)
MONOCYTES NFR BLD AUTO: 4.8 %
MONOCYTES NFR BLD AUTO: 5.9 %
MONOCYTES NFR BLD AUTO: 6.1 %
MONOCYTES NFR BLD AUTO: 8.3 %
MRSA DNA SPEC QL NAA+PROBE: NEGATIVE
MUCOUS THREADS #/AREA URNS LPF: PRESENT /LPF
NEUTROPHILS # BLD AUTO: 11.7 10E9/L (ref 1.6–8.3)
NEUTROPHILS # BLD AUTO: 4.6 10E9/L (ref 1.6–8.3)
NEUTROPHILS # BLD AUTO: 5.1 THOU/UL (ref 2–7.7)
NEUTROPHILS # BLD AUTO: 5.4 10E9/L (ref 1.6–8.3)
NEUTROPHILS # BLD AUTO: 5.9 10E9/L (ref 1.6–8.3)
NEUTROPHILS NFR BLD AUTO: 60 %
NEUTROPHILS NFR BLD AUTO: 61 % (ref 50–70)
NEUTROPHILS NFR BLD AUTO: 63.3 %
NEUTROPHILS NFR BLD AUTO: 73.3 %
NEUTROPHILS NFR BLD AUTO: 85.3 %
NITRATE UR QL: NEGATIVE
NRBC # BLD AUTO: 0 10*3/UL
NRBC BLD AUTO-RTO: 0 /100
NT-PROBNP SERPL-MCNC: 2560 PG/ML (ref 0–900)
NT-PROBNP SERPL-MCNC: 8013 PG/ML (ref 0–900)
PH UR STRIP: 6 PH (ref 5–7)
PH UR STRIP: 6 [PH] (ref 4.5–8)
PH UR STRIP: 7 PH (ref 5–7)
PLATELET # BLD AUTO: 195 10E9/L (ref 150–450)
PLATELET # BLD AUTO: 209 THOU/UL (ref 140–440)
PLATELET # BLD AUTO: 213 10E9/L (ref 150–450)
PLATELET # BLD AUTO: 217 THOU/UL (ref 140–440)
PLATELET # BLD AUTO: 217 THOU/UL (ref 140–440)
PLATELET # BLD AUTO: 237 10E9/L (ref 150–450)
PLATELET # BLD AUTO: 242 10E9/L (ref 150–450)
PLATELET # BLD AUTO: 243 10E9/L (ref 150–450)
PLATELET # BLD AUTO: 248 10E9/L (ref 150–450)
PLATELET # BLD AUTO: 288 10E9/L (ref 150–450)
PMV BLD AUTO: 10.8 FL (ref 8.5–12.5)
PMV BLD AUTO: 11.4 FL (ref 8.5–12.5)
POTASSIUM BLD-SCNC: 4.1 MMOL/L (ref 3.5–5)
POTASSIUM BLD-SCNC: 4.1 MMOL/L (ref 3.5–5)
POTASSIUM BLD-SCNC: 4.5 MMOL/L (ref 3.5–5)
POTASSIUM BLD-SCNC: 4.8 MMOL/L (ref 3.5–5)
POTASSIUM SERPL-SCNC: 4.1 MMOL/L (ref 3.4–5.3)
POTASSIUM SERPL-SCNC: 4.1 MMOL/L (ref 3.4–5.3)
POTASSIUM SERPL-SCNC: 4.1 MMOL/L (ref 3.5–5)
POTASSIUM SERPL-SCNC: 4.7 MMOL/L (ref 3.4–5.3)
POTASSIUM SERPL-SCNC: 5.4 MMOL/L (ref 3.4–5.3)
PROT SERPL-MCNC: 6.3 G/DL (ref 6–8)
PROT SERPL-MCNC: 8.1 G/DL (ref 6.8–8.8)
PROT SERPL-MCNC: 8.1 G/DL (ref 6.8–8.8)
RBC # BLD AUTO: 3.57 MILL/UL (ref 3.8–5.4)
RBC # BLD AUTO: 4.13 MILL/UL (ref 3.8–5.4)
RBC # BLD AUTO: 4.13 MILL/UL (ref 3.8–5.4)
RBC # BLD AUTO: 4.14 10E12/L (ref 3.8–5.2)
RBC # BLD AUTO: 4.16 10E12/L (ref 3.8–5.2)
RBC # BLD AUTO: 4.2 10E12/L (ref 3.8–5.2)
RBC # BLD AUTO: 4.34 10E12/L (ref 3.8–5.2)
RBC # BLD AUTO: 4.72 10E12/L (ref 3.8–5.2)
RBC # BLD AUTO: 4.83 10E12/L (ref 3.8–5.2)
RBC #/AREA URNS AUTO: 3 /HPF (ref 0–2)
RBC #/AREA URNS AUTO: 3 /HPF (ref 0–2)
RBC #/AREA URNS AUTO: >100 HPF
SODIUM SERPL-SCNC: 136 MMOL/L (ref 133–144)
SODIUM SERPL-SCNC: 136 MMOL/L (ref 133–144)
SODIUM SERPL-SCNC: 136 MMOL/L (ref 136–145)
SODIUM SERPL-SCNC: 136 MMOL/L (ref 136–145)
SODIUM SERPL-SCNC: 137 MMOL/L (ref 133–144)
SODIUM SERPL-SCNC: 138 MMOL/L (ref 133–144)
SODIUM SERPL-SCNC: 138 MMOL/L (ref 136–145)
SODIUM SERPL-SCNC: 139 MMOL/L (ref 136–145)
SODIUM SERPL-SCNC: 140 MMOL/L (ref 133–144)
SODIUM SERPL-SCNC: 140 MMOL/L (ref 133–144)
SODIUM SERPL-SCNC: 141 MMOL/L (ref 136–145)
SOURCE: ABNORMAL
SOURCE: ABNORMAL
SP GR UR STRIP: 1.01 (ref 1–1.03)
SP GR UR STRIP: 1.02 (ref 1–1.03)
SP GR UR STRIP: 1.02 (ref 1–1.03)
SPECIMEN SOURCE: NORMAL
SQUAMOUS #/AREA URNS AUTO: ABNORMAL LPF
T4 FREE SERPL-MCNC: 1.45 NG/DL (ref 0.76–1.46)
TROPONIN I SERPL-MCNC: 0.02 UG/L (ref 0–0.04)
TROPONIN I SERPL-MCNC: 0.04 UG/L (ref 0–0.04)
TROPONIN I SERPL-MCNC: 0.04 UG/L (ref 0–0.04)
TROPONIN I SERPL-MCNC: 0.05 UG/L (ref 0–0.04)
TROPONIN I SERPL-MCNC: 0.38 UG/L (ref 0–0.04)
TROPONIN I SERPL-MCNC: 6.03 UG/L (ref 0–0.04)
TROPONIN I SERPL-MCNC: 6.24 UG/L (ref 0–0.04)
TSH SERPL DL<=0.005 MIU/L-ACNC: 4.82 MU/L (ref 0.4–4)
UROBILINOGEN UR STRIP-ACNC: ABNORMAL
UROBILINOGEN UR STRIP-MCNC: 2 MG/DL (ref 0–2)
UROBILINOGEN UR STRIP-MCNC: 4 MG/DL (ref 0–2)
WBC # BLD AUTO: 13.7 10E9/L (ref 4–11)
WBC # BLD AUTO: 14.3 10E9/L (ref 4–11)
WBC # BLD AUTO: 7.7 10E9/L (ref 4–11)
WBC # BLD AUTO: 8 10E9/L (ref 4–11)
WBC # BLD AUTO: 8.4 10E9/L (ref 4–11)
WBC # BLD AUTO: 8.4 THOU/UL (ref 4–11)
WBC # BLD AUTO: 8.6 10E9/L (ref 4–11)
WBC #/AREA URNS AUTO: 2 /HPF (ref 0–5)
WBC #/AREA URNS AUTO: <1 /HPF (ref 0–5)
WBC #/AREA URNS AUTO: ABNORMAL HPF
WBC: 6.8 THOU/UL (ref 4–11)
WBC: 8.4 THOU/UL (ref 4–11)

## 2019-01-01 PROCEDURE — 25000125 ZZHC RX 250: Performed by: FAMILY MEDICINE

## 2019-01-01 PROCEDURE — 25000132 ZZH RX MED GY IP 250 OP 250 PS 637

## 2019-01-01 PROCEDURE — 25800030 ZZH RX IP 258 OP 636: Performed by: FAMILY MEDICINE

## 2019-01-01 PROCEDURE — 85025 COMPLETE CBC W/AUTO DIFF WBC: CPT | Performed by: FAMILY MEDICINE

## 2019-01-01 PROCEDURE — 93010 ELECTROCARDIOGRAM REPORT: CPT | Mod: Z6 | Performed by: FAMILY MEDICINE

## 2019-01-01 PROCEDURE — 70450 CT HEAD/BRAIN W/O DYE: CPT

## 2019-01-01 PROCEDURE — 93005 ELECTROCARDIOGRAM TRACING: CPT | Performed by: EMERGENCY MEDICINE

## 2019-01-01 PROCEDURE — 40000275 ZZH STATISTIC RCP TIME EA 10 MIN

## 2019-01-01 PROCEDURE — 99309 SBSQ NF CARE MODERATE MDM 30: CPT | Performed by: FAMILY MEDICINE

## 2019-01-01 PROCEDURE — 00000146 ZZHCL STATISTIC GLUCOSE BY METER IP

## 2019-01-01 PROCEDURE — 73090 X-RAY EXAM OF FOREARM: CPT | Mod: LT

## 2019-01-01 PROCEDURE — 96365 THER/PROPH/DIAG IV INF INIT: CPT | Performed by: EMERGENCY MEDICINE

## 2019-01-01 PROCEDURE — 99221 1ST HOSP IP/OBS SF/LOW 40: CPT | Mod: AI | Performed by: FAMILY MEDICINE

## 2019-01-01 PROCEDURE — 36415 COLL VENOUS BLD VENIPUNCTURE: CPT | Performed by: FAMILY MEDICINE

## 2019-01-01 PROCEDURE — 25000128 H RX IP 250 OP 636

## 2019-01-01 PROCEDURE — 99232 SBSQ HOSP IP/OBS MODERATE 35: CPT | Performed by: INTERNAL MEDICINE

## 2019-01-01 PROCEDURE — 93005 ELECTROCARDIOGRAM TRACING: CPT

## 2019-01-01 PROCEDURE — 25000132 ZZH RX MED GY IP 250 OP 250 PS 637: Performed by: FAMILY MEDICINE

## 2019-01-01 PROCEDURE — 99310 SBSQ NF CARE HIGH MDM 45: CPT | Performed by: NURSE PRACTITIONER

## 2019-01-01 PROCEDURE — 99309 SBSQ NF CARE MODERATE MDM 30: CPT | Mod: GV | Performed by: NURSE PRACTITIONER

## 2019-01-01 PROCEDURE — 71046 X-RAY EXAM CHEST 2 VIEWS: CPT

## 2019-01-01 PROCEDURE — 25000132 ZZH RX MED GY IP 250 OP 250 PS 637: Performed by: INTERNAL MEDICINE

## 2019-01-01 PROCEDURE — 99285 EMERGENCY DEPT VISIT HI MDM: CPT | Mod: 25 | Performed by: FAMILY MEDICINE

## 2019-01-01 PROCEDURE — 99309 SBSQ NF CARE MODERATE MDM 30: CPT | Performed by: NURSE PRACTITIONER

## 2019-01-01 PROCEDURE — 36415 COLL VENOUS BLD VENIPUNCTURE: CPT

## 2019-01-01 PROCEDURE — 83605 ASSAY OF LACTIC ACID: CPT

## 2019-01-01 PROCEDURE — 85027 COMPLETE CBC AUTOMATED: CPT

## 2019-01-01 PROCEDURE — 84484 ASSAY OF TROPONIN QUANT: CPT | Performed by: INTERNAL MEDICINE

## 2019-01-01 PROCEDURE — 25000128 H RX IP 250 OP 636: Performed by: INTERNAL MEDICINE

## 2019-01-01 PROCEDURE — 80048 BASIC METABOLIC PNL TOTAL CA: CPT | Performed by: FAMILY MEDICINE

## 2019-01-01 PROCEDURE — 25800030 ZZH RX IP 258 OP 636: Performed by: PHYSICIAN ASSISTANT

## 2019-01-01 PROCEDURE — 20000003 ZZH R&B ICU

## 2019-01-01 PROCEDURE — 80053 COMPREHEN METABOLIC PANEL: CPT | Performed by: FAMILY MEDICINE

## 2019-01-01 PROCEDURE — 99232 SBSQ HOSP IP/OBS MODERATE 35: CPT | Performed by: FAMILY MEDICINE

## 2019-01-01 PROCEDURE — 82565 ASSAY OF CREATININE: CPT | Performed by: INTERNAL MEDICINE

## 2019-01-01 PROCEDURE — 96374 THER/PROPH/DIAG INJ IV PUSH: CPT | Mod: 59 | Performed by: FAMILY MEDICINE

## 2019-01-01 PROCEDURE — 25800030 ZZH RX IP 258 OP 636: Performed by: INTERNAL MEDICINE

## 2019-01-01 PROCEDURE — 85049 AUTOMATED PLATELET COUNT: CPT | Performed by: INTERNAL MEDICINE

## 2019-01-01 PROCEDURE — 36415 COLL VENOUS BLD VENIPUNCTURE: CPT | Performed by: INTERNAL MEDICINE

## 2019-01-01 PROCEDURE — 12000011 ZZH R&B MS OVERFLOW

## 2019-01-01 PROCEDURE — 25800030 ZZH RX IP 258 OP 636

## 2019-01-01 PROCEDURE — 25000128 H RX IP 250 OP 636: Performed by: FAMILY MEDICINE

## 2019-01-01 PROCEDURE — 85520 HEPARIN ASSAY: CPT | Performed by: INTERNAL MEDICINE

## 2019-01-01 PROCEDURE — 96375 TX/PRO/DX INJ NEW DRUG ADDON: CPT | Performed by: EMERGENCY MEDICINE

## 2019-01-01 PROCEDURE — 96361 HYDRATE IV INFUSION ADD-ON: CPT | Performed by: FAMILY MEDICINE

## 2019-01-01 PROCEDURE — 96376 TX/PRO/DX INJ SAME DRUG ADON: CPT | Performed by: EMERGENCY MEDICINE

## 2019-01-01 PROCEDURE — 99310 SBSQ NF CARE HIGH MDM 45: CPT | Mod: GV | Performed by: NURSE PRACTITIONER

## 2019-01-01 PROCEDURE — 84484 ASSAY OF TROPONIN QUANT: CPT | Performed by: FAMILY MEDICINE

## 2019-01-01 PROCEDURE — 99291 CRITICAL CARE FIRST HOUR: CPT

## 2019-01-01 PROCEDURE — 87804 INFLUENZA ASSAY W/OPTIC: CPT | Mod: 91 | Performed by: FAMILY MEDICINE

## 2019-01-01 PROCEDURE — 72125 CT NECK SPINE W/O DYE: CPT

## 2019-01-01 PROCEDURE — 83735 ASSAY OF MAGNESIUM: CPT | Performed by: FAMILY MEDICINE

## 2019-01-01 PROCEDURE — 99285 EMERGENCY DEPT VISIT HI MDM: CPT | Mod: 25 | Performed by: EMERGENCY MEDICINE

## 2019-01-01 PROCEDURE — 99318 ZZC ANNUAL NURSING FAC ASSESSMNT, STABLE: CPT | Performed by: NURSE PRACTITIONER

## 2019-01-01 PROCEDURE — 74177 CT ABD & PELVIS W/CONTRAST: CPT

## 2019-01-01 PROCEDURE — 99217 ZZC OBSERVATION CARE DISCHARGE: CPT | Performed by: FAMILY MEDICINE

## 2019-01-01 PROCEDURE — 83880 ASSAY OF NATRIURETIC PEPTIDE: CPT | Performed by: FAMILY MEDICINE

## 2019-01-01 PROCEDURE — 25000125 ZZHC RX 250: Performed by: INTERNAL MEDICINE

## 2019-01-01 PROCEDURE — 93005 ELECTROCARDIOGRAM TRACING: CPT | Performed by: FAMILY MEDICINE

## 2019-01-01 PROCEDURE — 93010 ELECTROCARDIOGRAM REPORT: CPT | Mod: Z6 | Performed by: EMERGENCY MEDICINE

## 2019-01-01 PROCEDURE — 85520 HEPARIN ASSAY: CPT

## 2019-01-01 PROCEDURE — 25800030 ZZH RX IP 258 OP 636: Performed by: EMERGENCY MEDICINE

## 2019-01-01 PROCEDURE — 83036 HEMOGLOBIN GLYCOSYLATED A1C: CPT | Performed by: INTERNAL MEDICINE

## 2019-01-01 PROCEDURE — 85027 COMPLETE CBC AUTOMATED: CPT | Performed by: FAMILY MEDICINE

## 2019-01-01 PROCEDURE — G0378 HOSPITAL OBSERVATION PER HR: HCPCS

## 2019-01-01 PROCEDURE — 84439 ASSAY OF FREE THYROXINE: CPT | Performed by: FAMILY MEDICINE

## 2019-01-01 PROCEDURE — 25000128 H RX IP 250 OP 636: Performed by: EMERGENCY MEDICINE

## 2019-01-01 PROCEDURE — 84443 ASSAY THYROID STIM HORMONE: CPT | Performed by: FAMILY MEDICINE

## 2019-01-01 PROCEDURE — 80162 ASSAY OF DIGOXIN TOTAL: CPT | Performed by: FAMILY MEDICINE

## 2019-01-01 PROCEDURE — 83605 ASSAY OF LACTIC ACID: CPT | Performed by: FAMILY MEDICINE

## 2019-01-01 PROCEDURE — 99223 1ST HOSP IP/OBS HIGH 75: CPT | Mod: AI | Performed by: FAMILY MEDICINE

## 2019-01-01 PROCEDURE — 96376 TX/PRO/DX INJ SAME DRUG ADON: CPT | Mod: 59

## 2019-01-01 PROCEDURE — 84484 ASSAY OF TROPONIN QUANT: CPT | Mod: 91 | Performed by: INTERNAL MEDICINE

## 2019-01-01 PROCEDURE — 96375 TX/PRO/DX INJ NEW DRUG ADDON: CPT | Mod: 59 | Performed by: FAMILY MEDICINE

## 2019-01-01 PROCEDURE — 92526 ORAL FUNCTION THERAPY: CPT | Mod: GN | Performed by: SPEECH-LANGUAGE PATHOLOGIST

## 2019-01-01 PROCEDURE — 87640 STAPH A DNA AMP PROBE: CPT | Performed by: FAMILY MEDICINE

## 2019-01-01 PROCEDURE — 99207 ZZC CDG-CHARGE REQUIRED MANUAL ENTRY: CPT | Performed by: FAMILY MEDICINE

## 2019-01-01 PROCEDURE — 25000132 ZZH RX MED GY IP 250 OP 250 PS 637: Performed by: PHYSICIAN ASSISTANT

## 2019-01-01 PROCEDURE — 99239 HOSP IP/OBS DSCHRG MGMT >30: CPT | Performed by: INTERNAL MEDICINE

## 2019-01-01 PROCEDURE — 96366 THER/PROPH/DIAG IV INF ADDON: CPT | Performed by: EMERGENCY MEDICINE

## 2019-01-01 PROCEDURE — 73060 X-RAY EXAM OF HUMERUS: CPT | Mod: LT

## 2019-01-01 PROCEDURE — 99207 ZZC CDG-CODE CATEGORY CHANGED: CPT | Performed by: FAMILY MEDICINE

## 2019-01-01 PROCEDURE — 80048 BASIC METABOLIC PNL TOTAL CA: CPT

## 2019-01-01 PROCEDURE — 96365 THER/PROPH/DIAG IV INF INIT: CPT | Performed by: FAMILY MEDICINE

## 2019-01-01 PROCEDURE — 99217 ZZC OBSERVATION CARE DISCHARGE: CPT

## 2019-01-01 PROCEDURE — 3E033XZ INTRODUCTION OF VASOPRESSOR INTO PERIPHERAL VEIN, PERCUTANEOUS APPROACH: ICD-10-PCS | Performed by: INTERNAL MEDICINE

## 2019-01-01 PROCEDURE — 71275 CT ANGIOGRAPHY CHEST: CPT

## 2019-01-01 PROCEDURE — 81001 URINALYSIS AUTO W/SCOPE: CPT | Performed by: FAMILY MEDICINE

## 2019-01-01 PROCEDURE — 81001 URINALYSIS AUTO W/SCOPE: CPT | Performed by: EMERGENCY MEDICINE

## 2019-01-01 PROCEDURE — 97161 PT EVAL LOW COMPLEX 20 MIN: CPT | Mod: GP | Performed by: PHYSICAL THERAPIST

## 2019-01-01 PROCEDURE — 25000128 H RX IP 250 OP 636: Performed by: PHYSICIAN ASSISTANT

## 2019-01-01 PROCEDURE — 96376 TX/PRO/DX INJ SAME DRUG ADON: CPT | Mod: 59 | Performed by: FAMILY MEDICINE

## 2019-01-01 PROCEDURE — 99232 SBSQ HOSP IP/OBS MODERATE 35: CPT

## 2019-01-01 PROCEDURE — 99238 HOSP IP/OBS DSCHRG MGMT 30/<: CPT

## 2019-01-01 PROCEDURE — 96366 THER/PROPH/DIAG IV INF ADDON: CPT | Performed by: FAMILY MEDICINE

## 2019-01-01 PROCEDURE — 70498 CT ANGIOGRAPHY NECK: CPT

## 2019-01-01 PROCEDURE — 25000125 ZZHC RX 250: Performed by: EMERGENCY MEDICINE

## 2019-01-01 PROCEDURE — 25000132 ZZH RX MED GY IP 250 OP 250 PS 637: Performed by: EMERGENCY MEDICINE

## 2019-01-01 PROCEDURE — 99310 SBSQ NF CARE HIGH MDM 45: CPT | Performed by: FAMILY MEDICINE

## 2019-01-01 PROCEDURE — 96375 TX/PRO/DX INJ NEW DRUG ADDON: CPT | Performed by: FAMILY MEDICINE

## 2019-01-01 PROCEDURE — 99220 ZZC INITIAL OBSERVATION CARE,LEVL III: CPT | Performed by: INTERNAL MEDICINE

## 2019-01-01 PROCEDURE — 87641 MR-STAPH DNA AMP PROBE: CPT | Performed by: FAMILY MEDICINE

## 2019-01-01 PROCEDURE — 83690 ASSAY OF LIPASE: CPT | Performed by: FAMILY MEDICINE

## 2019-01-01 RX ORDER — HYDROCODONE BITARTRATE AND ACETAMINOPHEN 5; 325 MG/1; MG/1
1-2 TABLET ORAL EVERY 4 HOURS PRN
Status: DISCONTINUED | OUTPATIENT
Start: 2019-01-01 | End: 2019-01-01 | Stop reason: HOSPADM

## 2019-01-01 RX ORDER — METOPROLOL TARTRATE 25 MG/1
25 TABLET, FILM COATED ORAL 2 TIMES DAILY
Status: DISCONTINUED | OUTPATIENT
Start: 2019-01-01 | End: 2019-01-01

## 2019-01-01 RX ORDER — CLONIDINE HYDROCHLORIDE 0.2 MG/1
0.2 TABLET ORAL 2 TIMES DAILY
Status: DISCONTINUED | OUTPATIENT
Start: 2019-01-01 | End: 2019-01-01 | Stop reason: HOSPADM

## 2019-01-01 RX ORDER — ALUMINA, MAGNESIA, AND SIMETHICONE 2400; 2400; 240 MG/30ML; MG/30ML; MG/30ML
SUSPENSION ORAL
Status: COMPLETED
Start: 2019-01-01 | End: 2019-01-01

## 2019-01-01 RX ORDER — ONDANSETRON 2 MG/ML
4 INJECTION INTRAMUSCULAR; INTRAVENOUS EVERY 6 HOURS PRN
Status: DISCONTINUED | OUTPATIENT
Start: 2019-01-01 | End: 2019-01-01 | Stop reason: HOSPADM

## 2019-01-01 RX ORDER — ACETAMINOPHEN 325 MG/1
650 TABLET ORAL 3 TIMES DAILY
Status: DISCONTINUED | OUTPATIENT
Start: 2019-01-01 | End: 2019-01-01 | Stop reason: HOSPADM

## 2019-01-01 RX ORDER — QUETIAPINE FUMARATE 25 MG/1
25 TABLET, FILM COATED ORAL 2 TIMES DAILY PRN
Status: DISCONTINUED | OUTPATIENT
Start: 2019-01-01 | End: 2019-01-01 | Stop reason: HOSPADM

## 2019-01-01 RX ORDER — ATORVASTATIN CALCIUM 20 MG/1
20 TABLET, FILM COATED ORAL EVERY MORNING
Status: DISCONTINUED | OUTPATIENT
Start: 2019-01-01 | End: 2019-01-01 | Stop reason: HOSPADM

## 2019-01-01 RX ORDER — DILTIAZEM HYDROCHLORIDE 180 MG/1
360 CAPSULE, COATED, EXTENDED RELEASE ORAL DAILY
Status: DISCONTINUED | OUTPATIENT
Start: 2019-01-01 | End: 2019-01-01 | Stop reason: HOSPADM

## 2019-01-01 RX ORDER — QUETIAPINE FUMARATE 25 MG/1
25 TABLET, FILM COATED ORAL ONCE
Status: COMPLETED | OUTPATIENT
Start: 2019-01-01 | End: 2019-01-01

## 2019-01-01 RX ORDER — ONDANSETRON 2 MG/ML
4 INJECTION INTRAMUSCULAR; INTRAVENOUS ONCE
Status: COMPLETED | OUTPATIENT
Start: 2019-01-01 | End: 2019-01-01

## 2019-01-01 RX ORDER — SODIUM CHLORIDE 9 MG/ML
INJECTION, SOLUTION INTRAVENOUS CONTINUOUS
Status: DISCONTINUED | OUTPATIENT
Start: 2019-01-01 | End: 2019-01-01

## 2019-01-01 RX ORDER — CLONIDINE HYDROCHLORIDE 0.2 MG/1
0.1 TABLET ORAL DAILY
Start: 2019-01-01 | End: 2019-01-01

## 2019-01-01 RX ORDER — DOPAMINE HYDROCHLORIDE 160 MG/100ML
2-20 INJECTION, SOLUTION INTRAVENOUS CONTINUOUS
Status: DISCONTINUED | OUTPATIENT
Start: 2019-01-01 | End: 2019-01-01

## 2019-01-01 RX ORDER — OSELTAMIVIR PHOSPHATE 75 MG/1
75 CAPSULE ORAL DAILY
Status: ON HOLD | COMMUNITY
Start: 2019-01-01 | End: 2019-01-01

## 2019-01-01 RX ORDER — METOPROLOL TARTRATE 25 MG/1
25 TABLET, FILM COATED ORAL 2 TIMES DAILY
Status: DISCONTINUED | OUTPATIENT
Start: 2019-01-01 | End: 2019-01-01 | Stop reason: HOSPADM

## 2019-01-01 RX ORDER — ATORVASTATIN CALCIUM 20 MG/1
20 TABLET, FILM COATED ORAL DAILY
Status: DISCONTINUED | OUTPATIENT
Start: 2019-01-01 | End: 2019-01-01 | Stop reason: HOSPADM

## 2019-01-01 RX ORDER — DILTIAZEM HCL 60 MG
60 TABLET ORAL EVERY 6 HOURS SCHEDULED
Status: DISCONTINUED | OUTPATIENT
Start: 2019-01-01 | End: 2019-01-01

## 2019-01-01 RX ORDER — ONDANSETRON 4 MG/1
4 TABLET, FILM COATED ORAL EVERY 6 HOURS PRN
COMMUNITY
End: 2020-01-01

## 2019-01-01 RX ORDER — SENNOSIDES 8.6 MG
1 TABLET ORAL 2 TIMES DAILY
Status: DISCONTINUED | OUTPATIENT
Start: 2019-01-01 | End: 2019-01-01 | Stop reason: HOSPADM

## 2019-01-01 RX ORDER — LISINOPRIL 5 MG/1
5 TABLET ORAL DAILY
Start: 2019-01-01 | End: 2019-01-01 | Stop reason: DRUGHIGH

## 2019-01-01 RX ORDER — ASPIRIN 81 MG/1
162 TABLET, CHEWABLE ORAL DAILY
Status: DISCONTINUED | OUTPATIENT
Start: 2019-01-01 | End: 2019-01-01 | Stop reason: HOSPADM

## 2019-01-01 RX ORDER — IOPAMIDOL 755 MG/ML
60 INJECTION, SOLUTION INTRAVASCULAR ONCE
Status: COMPLETED | OUTPATIENT
Start: 2019-01-01 | End: 2019-01-01

## 2019-01-01 RX ORDER — DIGOXIN 125 MCG
125 TABLET ORAL DAILY
Status: DISCONTINUED | OUTPATIENT
Start: 2019-01-01 | End: 2019-01-01 | Stop reason: HOSPADM

## 2019-01-01 RX ORDER — ISOSORBIDE MONONITRATE 30 MG/1
30 TABLET, EXTENDED RELEASE ORAL DAILY
Start: 2019-01-01 | End: 2019-01-01

## 2019-01-01 RX ORDER — CLONIDINE HYDROCHLORIDE 0.2 MG/1
0.2 TABLET ORAL 2 TIMES DAILY
DISCHARGE
Start: 2019-01-01 | End: 2019-01-01

## 2019-01-01 RX ORDER — MAGNESIUM CARB/ALUMINUM HYDROX 105-160MG
148 TABLET,CHEWABLE ORAL
Status: DISCONTINUED | OUTPATIENT
Start: 2019-01-01 | End: 2019-01-01 | Stop reason: HOSPADM

## 2019-01-01 RX ORDER — NICOTINE POLACRILEX 4 MG/1
20 GUM, CHEWING ORAL DAILY
Status: DISCONTINUED | OUTPATIENT
Start: 2019-01-01 | End: 2019-01-01

## 2019-01-01 RX ORDER — METOPROLOL TARTRATE 25 MG/1
12.5 TABLET, FILM COATED ORAL 2 TIMES DAILY
Start: 2019-01-01 | End: 2019-01-01

## 2019-01-01 RX ORDER — LISINOPRIL 5 MG/1
2.5 TABLET ORAL DAILY
COMMUNITY
End: 2020-01-01

## 2019-01-01 RX ORDER — GABAPENTIN 100 MG/1
100 CAPSULE ORAL AT BEDTIME
Status: DISCONTINUED | OUTPATIENT
Start: 2019-01-01 | End: 2019-01-01 | Stop reason: HOSPADM

## 2019-01-01 RX ORDER — LIDOCAINE 40 MG/G
CREAM TOPICAL
Status: DISCONTINUED | OUTPATIENT
Start: 2019-01-01 | End: 2019-01-01 | Stop reason: HOSPADM

## 2019-01-01 RX ORDER — DILTIAZEM HYDROCHLORIDE 5 MG/ML
10 INJECTION INTRAVENOUS ONCE
Status: COMPLETED | OUTPATIENT
Start: 2019-01-01 | End: 2019-01-01

## 2019-01-01 RX ORDER — ONDANSETRON 2 MG/ML
4 INJECTION INTRAMUSCULAR; INTRAVENOUS EVERY 30 MIN PRN
Status: DISCONTINUED | OUTPATIENT
Start: 2019-01-01 | End: 2019-01-01

## 2019-01-01 RX ORDER — AMOXICILLIN 250 MG
2 CAPSULE ORAL 2 TIMES DAILY PRN
Status: DISCONTINUED | OUTPATIENT
Start: 2019-01-01 | End: 2019-01-01 | Stop reason: HOSPADM

## 2019-01-01 RX ORDER — CLONIDINE HYDROCHLORIDE 0.1 MG/1
0.1 TABLET ORAL 2 TIMES DAILY PRN
Refills: 99 | Status: ON HOLD | COMMUNITY
Start: 2019-02-27 | End: 2019-01-01

## 2019-01-01 RX ORDER — CLONIDINE HYDROCHLORIDE 0.1 MG/1
0.1 TABLET ORAL 2 TIMES DAILY
Status: DISCONTINUED | OUTPATIENT
Start: 2019-01-01 | End: 2019-01-01

## 2019-01-01 RX ORDER — DIGOXIN 125 MCG
125 TABLET ORAL DAILY
Status: DISCONTINUED | OUTPATIENT
Start: 2019-01-01 | End: 2019-01-01

## 2019-01-01 RX ORDER — ALUMINA, MAGNESIA, AND SIMETHICONE 2400; 2400; 240 MG/30ML; MG/30ML; MG/30ML
20 SUSPENSION ORAL ONCE
Status: COMPLETED | OUTPATIENT
Start: 2019-01-01 | End: 2019-01-01

## 2019-01-01 RX ORDER — DIGOXIN 0.25 MG/ML
250 INJECTION INTRAMUSCULAR; INTRAVENOUS ONCE
Status: DISCONTINUED | OUTPATIENT
Start: 2019-01-01 | End: 2019-01-01

## 2019-01-01 RX ORDER — ACETAMINOPHEN 325 MG/1
650 TABLET ORAL EVERY 4 HOURS PRN
Status: DISCONTINUED | OUTPATIENT
Start: 2019-01-01 | End: 2019-01-01 | Stop reason: HOSPADM

## 2019-01-01 RX ORDER — OSELTAMIVIR PHOSPHATE 30 MG/1
30 CAPSULE ORAL DAILY
Status: DISCONTINUED | OUTPATIENT
Start: 2019-01-01 | End: 2019-01-01 | Stop reason: HOSPADM

## 2019-01-01 RX ORDER — SERTRALINE HYDROCHLORIDE 25 MG/1
25 TABLET, FILM COATED ORAL DAILY
Status: DISCONTINUED | OUTPATIENT
Start: 2019-01-01 | End: 2019-01-01 | Stop reason: HOSPADM

## 2019-01-01 RX ORDER — CLONIDINE HYDROCHLORIDE 0.1 MG/1
0.1 TABLET ORAL 2 TIMES DAILY PRN
Status: DISCONTINUED | OUTPATIENT
Start: 2019-01-01 | End: 2019-01-01

## 2019-01-01 RX ORDER — OSELTAMIVIR PHOSPHATE 75 MG/1
75 CAPSULE ORAL DAILY
DISCHARGE
Start: 2019-01-01 | End: 2019-01-01

## 2019-01-01 RX ORDER — CARVEDILOL 3.12 MG/1
3.12 TABLET ORAL 2 TIMES DAILY WITH MEALS
Start: 2019-01-01

## 2019-01-01 RX ORDER — LISINOPRIL 2.5 MG/1
2.5 TABLET ORAL DAILY
Status: DISCONTINUED | OUTPATIENT
Start: 2019-01-01 | End: 2019-01-01 | Stop reason: HOSPADM

## 2019-01-01 RX ORDER — LISINOPRIL 2.5 MG/1
2.5 TABLET ORAL DAILY
Start: 2019-01-01 | End: 2019-01-01

## 2019-01-01 RX ORDER — NITROGLYCERIN 0.4 MG/1
TABLET SUBLINGUAL
DISCHARGE
Start: 2019-01-01 | End: 2020-01-01

## 2019-01-01 RX ORDER — ONDANSETRON 4 MG/1
4 TABLET, ORALLY DISINTEGRATING ORAL EVERY 6 HOURS PRN
Status: DISCONTINUED | OUTPATIENT
Start: 2019-01-01 | End: 2019-01-01 | Stop reason: HOSPADM

## 2019-01-01 RX ORDER — NALOXONE HYDROCHLORIDE 0.4 MG/ML
.1-.4 INJECTION, SOLUTION INTRAMUSCULAR; INTRAVENOUS; SUBCUTANEOUS
Status: DISCONTINUED | OUTPATIENT
Start: 2019-01-01 | End: 2019-01-01 | Stop reason: HOSPADM

## 2019-01-01 RX ORDER — NALOXONE HYDROCHLORIDE 0.4 MG/ML
.1-.4 INJECTION, SOLUTION INTRAMUSCULAR; INTRAVENOUS; SUBCUTANEOUS
Status: DISCONTINUED | OUTPATIENT
Start: 2019-01-01 | End: 2019-01-01

## 2019-01-01 RX ORDER — METOPROLOL TARTRATE 1 MG/ML
2.5 INJECTION, SOLUTION INTRAVENOUS ONCE
Status: COMPLETED | OUTPATIENT
Start: 2019-01-01 | End: 2019-01-01

## 2019-01-01 RX ORDER — SODIUM CHLORIDE 9 MG/ML
INJECTION, SOLUTION INTRAVENOUS CONTINUOUS
Status: ACTIVE | OUTPATIENT
Start: 2019-01-01 | End: 2019-01-01

## 2019-01-01 RX ORDER — IOPAMIDOL 755 MG/ML
70 INJECTION, SOLUTION INTRAVASCULAR ONCE
Status: COMPLETED | OUTPATIENT
Start: 2019-01-01 | End: 2019-01-01

## 2019-01-01 RX ORDER — PROCHLORPERAZINE 25 MG
12.5 SUPPOSITORY, RECTAL RECTAL EVERY 12 HOURS PRN
Status: DISCONTINUED | OUTPATIENT
Start: 2019-01-01 | End: 2019-01-01 | Stop reason: HOSPADM

## 2019-01-01 RX ORDER — ONDANSETRON 4 MG/1
4 TABLET, ORALLY DISINTEGRATING ORAL EVERY 6 HOURS PRN
Status: DISCONTINUED | OUTPATIENT
Start: 2019-01-01 | End: 2019-01-01

## 2019-01-01 RX ORDER — DIGOXIN 0.25 MG/ML
250 INJECTION INTRAMUSCULAR; INTRAVENOUS ONCE
Status: COMPLETED | OUTPATIENT
Start: 2019-01-01 | End: 2019-01-01

## 2019-01-01 RX ORDER — METOPROLOL TARTRATE 1 MG/ML
2.5 INJECTION, SOLUTION INTRAVENOUS EVERY 30 MIN PRN
Status: COMPLETED | OUTPATIENT
Start: 2019-01-01 | End: 2019-01-01

## 2019-01-01 RX ORDER — DIGOXIN 0.25 MG/ML
500 INJECTION INTRAMUSCULAR; INTRAVENOUS ONCE
Status: COMPLETED | OUTPATIENT
Start: 2019-01-01 | End: 2019-01-01

## 2019-01-01 RX ORDER — METOPROLOL TARTRATE 25 MG/1
25 TABLET, FILM COATED ORAL ONCE
Status: COMPLETED | OUTPATIENT
Start: 2019-01-01 | End: 2019-01-01

## 2019-01-01 RX ORDER — POLYETHYLENE GLYCOL 3350 17 G/17G
17 POWDER, FOR SOLUTION ORAL DAILY
Status: DISCONTINUED | OUTPATIENT
Start: 2019-01-01 | End: 2019-01-01 | Stop reason: HOSPADM

## 2019-01-01 RX ORDER — DEXTROSE MONOHYDRATE 25 G/50ML
25-50 INJECTION, SOLUTION INTRAVENOUS
Status: DISCONTINUED | OUTPATIENT
Start: 2019-01-01 | End: 2019-01-01 | Stop reason: HOSPADM

## 2019-01-01 RX ORDER — NITROGLYCERIN 0.4 MG/1
0.4 TABLET SUBLINGUAL EVERY 5 MIN PRN
Status: DISCONTINUED | OUTPATIENT
Start: 2019-01-01 | End: 2019-01-01 | Stop reason: HOSPADM

## 2019-01-01 RX ORDER — DEXTROSE MONOHYDRATE 25 G/50ML
25-50 INJECTION, SOLUTION INTRAVENOUS
Status: DISCONTINUED | OUTPATIENT
Start: 2019-01-01 | End: 2019-01-01

## 2019-01-01 RX ORDER — DILTIAZEM HYDROCHLORIDE 180 MG/1
360 CAPSULE, COATED, EXTENDED RELEASE ORAL DAILY
Status: DISCONTINUED | OUTPATIENT
Start: 2019-01-01 | End: 2019-01-01

## 2019-01-01 RX ORDER — DIGOXIN 125 MCG
125 TABLET ORAL DAILY
DISCHARGE
Start: 2019-01-01 | End: 2019-01-01

## 2019-01-01 RX ORDER — ASPIRIN 81 MG/1
81 TABLET, CHEWABLE ORAL DAILY
Status: DISCONTINUED | OUTPATIENT
Start: 2019-01-01 | End: 2019-01-01 | Stop reason: HOSPADM

## 2019-01-01 RX ORDER — DIGOXIN 125 MCG
125 TABLET ORAL EVERY OTHER DAY
Start: 2019-01-01 | End: 2019-01-01

## 2019-01-01 RX ORDER — CLONIDINE HYDROCHLORIDE 0.2 MG/1
0.1 TABLET ORAL 2 TIMES DAILY
Start: 2019-01-01 | End: 2019-01-01

## 2019-01-01 RX ORDER — METOPROLOL TARTRATE 25 MG/1
25 TABLET, FILM COATED ORAL 2 TIMES DAILY
Status: ON HOLD
Start: 2019-01-01 | End: 2019-01-01

## 2019-01-01 RX ORDER — NICOTINE POLACRILEX 4 MG
15-30 LOZENGE BUCCAL
Status: DISCONTINUED | OUTPATIENT
Start: 2019-01-01 | End: 2019-01-01 | Stop reason: HOSPADM

## 2019-01-01 RX ORDER — LIDOCAINE 40 MG/G
CREAM TOPICAL
Status: DISCONTINUED | OUTPATIENT
Start: 2019-01-01 | End: 2019-01-01 | Stop reason: CLARIF

## 2019-01-01 RX ORDER — ISOSORBIDE MONONITRATE 60 MG/1
60 TABLET, EXTENDED RELEASE ORAL DAILY
Start: 2019-01-01 | End: 2020-01-01

## 2019-01-01 RX ORDER — PROCHLORPERAZINE MALEATE 5 MG
5 TABLET ORAL EVERY 6 HOURS PRN
Status: DISCONTINUED | OUTPATIENT
Start: 2019-01-01 | End: 2019-01-01 | Stop reason: HOSPADM

## 2019-01-01 RX ORDER — AMOXICILLIN 250 MG
1 CAPSULE ORAL 2 TIMES DAILY PRN
Status: DISCONTINUED | OUTPATIENT
Start: 2019-01-01 | End: 2019-01-01 | Stop reason: HOSPADM

## 2019-01-01 RX ORDER — METOPROLOL TARTRATE 1 MG/ML
5 INJECTION, SOLUTION INTRAVENOUS ONCE
Status: COMPLETED | OUTPATIENT
Start: 2019-01-01 | End: 2019-01-01

## 2019-01-01 RX ORDER — ACETAMINOPHEN 325 MG/1
650 TABLET ORAL 3 TIMES DAILY
Start: 2019-01-01

## 2019-01-01 RX ORDER — NICOTINE POLACRILEX 4 MG
15-30 LOZENGE BUCCAL
Status: DISCONTINUED | OUTPATIENT
Start: 2019-01-01 | End: 2019-01-01

## 2019-01-01 RX ORDER — DILTIAZEM HYDROCHLORIDE 360 MG/1
360 CAPSULE, EXTENDED RELEASE ORAL DAILY
Status: DISCONTINUED | OUTPATIENT
Start: 2019-01-01 | End: 2019-01-01 | Stop reason: RX

## 2019-01-01 RX ORDER — METOPROLOL TARTRATE 1 MG/ML
2.5 INJECTION, SOLUTION INTRAVENOUS
Status: DISCONTINUED | OUTPATIENT
Start: 2019-01-01 | End: 2019-01-01

## 2019-01-01 RX ORDER — MORPHINE SULFATE 30 MG/1
2.5 TABLET ORAL
COMMUNITY

## 2019-01-01 RX ORDER — POLYETHYLENE GLYCOL 3350 17 G/17G
17 POWDER, FOR SOLUTION ORAL DAILY PRN
Status: DISCONTINUED | OUTPATIENT
Start: 2019-01-01 | End: 2019-01-01 | Stop reason: HOSPADM

## 2019-01-01 RX ORDER — DIGOXIN 0.25 MG/ML
250 INJECTION INTRAMUSCULAR; INTRAVENOUS DAILY
Status: DISCONTINUED | OUTPATIENT
Start: 2019-01-01 | End: 2019-01-01

## 2019-01-01 RX ADMIN — APIXABAN 5 MG: 5 TABLET, FILM COATED ORAL at 10:22

## 2019-01-01 RX ADMIN — AMIODARONE HYDROCHLORIDE 1 MG/MIN: 50 INJECTION, SOLUTION INTRAVENOUS at 22:55

## 2019-01-01 RX ADMIN — SERTRALINE HYDROCHLORIDE 25 MG: 25 TABLET ORAL at 07:55

## 2019-01-01 RX ADMIN — APIXABAN 5 MG: 5 TABLET, FILM COATED ORAL at 21:24

## 2019-01-01 RX ADMIN — AMIODARONE HYDROCHLORIDE 1 MG/MIN: 50 INJECTION, SOLUTION INTRAVENOUS at 04:43

## 2019-01-01 RX ADMIN — ACETAMINOPHEN 650 MG: 325 TABLET, FILM COATED ORAL at 13:48

## 2019-01-01 RX ADMIN — ATORVASTATIN CALCIUM 20 MG: 20 TABLET, FILM COATED ORAL at 08:27

## 2019-01-01 RX ADMIN — SODIUM CHLORIDE, POTASSIUM CHLORIDE, SODIUM LACTATE AND CALCIUM CHLORIDE 500 ML: 600; 310; 30; 20 INJECTION, SOLUTION INTRAVENOUS at 11:35

## 2019-01-01 RX ADMIN — SENNOSIDES 1 TABLET: 8.6 TABLET, FILM COATED ORAL at 21:39

## 2019-01-01 RX ADMIN — ASPIRIN 81 MG 81 MG: 81 TABLET ORAL at 08:27

## 2019-01-01 RX ADMIN — METOPROLOL TARTRATE 2.5 MG: 1 INJECTION, SOLUTION INTRAVENOUS at 15:47

## 2019-01-01 RX ADMIN — DEXTROSE MONOHYDRATE 0.5 MG/MIN: 5 INJECTION, SOLUTION INTRAVENOUS at 20:29

## 2019-01-01 RX ADMIN — SERTRALINE HYDROCHLORIDE 25 MG: 25 TABLET ORAL at 08:57

## 2019-01-01 RX ADMIN — OMEPRAZOLE 20 MG: 20 CAPSULE, DELAYED RELEASE ORAL at 08:59

## 2019-01-01 RX ADMIN — ACETAMINOPHEN 650 MG: 325 TABLET, FILM COATED ORAL at 08:59

## 2019-01-01 RX ADMIN — ACETAMINOPHEN 650 MG: 325 TABLET, FILM COATED ORAL at 21:23

## 2019-01-01 RX ADMIN — OMEPRAZOLE 20 MG: 20 CAPSULE, DELAYED RELEASE ORAL at 08:28

## 2019-01-01 RX ADMIN — APIXABAN 5 MG: 5 TABLET, FILM COATED ORAL at 08:27

## 2019-01-01 RX ADMIN — GABAPENTIN 100 MG: 100 CAPSULE ORAL at 22:25

## 2019-01-01 RX ADMIN — ACETAMINOPHEN 650 MG: 325 TABLET, FILM COATED ORAL at 08:46

## 2019-01-01 RX ADMIN — ATORVASTATIN CALCIUM 20 MG: 20 TABLET, FILM COATED ORAL at 10:22

## 2019-01-01 RX ADMIN — DILTIAZEM HYDROCHLORIDE 360 MG: 180 CAPSULE, COATED, EXTENDED RELEASE ORAL at 08:46

## 2019-01-01 RX ADMIN — SODIUM CHLORIDE 100 ML: 9 INJECTION, SOLUTION INTRAVENOUS at 23:20

## 2019-01-01 RX ADMIN — GABAPENTIN 100 MG: 100 CAPSULE ORAL at 21:29

## 2019-01-01 RX ADMIN — ACETAMINOPHEN 650 MG: 325 TABLET, FILM COATED ORAL at 10:21

## 2019-01-01 RX ADMIN — METOPROLOL TARTRATE 25 MG: 25 TABLET ORAL at 08:46

## 2019-01-01 RX ADMIN — METOPROLOL TARTRATE 2.5 MG: 1 INJECTION, SOLUTION INTRAVENOUS at 15:14

## 2019-01-01 RX ADMIN — DIGOXIN 500 MCG: 0.25 INJECTION INTRAMUSCULAR; INTRAVENOUS at 09:49

## 2019-01-01 RX ADMIN — DOPAMINE HYDROCHLORIDE 2 MCG/KG/MIN: 160 INJECTION, SOLUTION INTRAVENOUS at 01:49

## 2019-01-01 RX ADMIN — POLYETHYLENE GLYCOL 3350 17 G: 17 POWDER, FOR SOLUTION ORAL at 07:55

## 2019-01-01 RX ADMIN — DILTIAZEM HYDROCHLORIDE 360 MG: 180 CAPSULE, COATED, EXTENDED RELEASE ORAL at 08:27

## 2019-01-01 RX ADMIN — PROCHLORPERAZINE MALEATE 5 MG: 5 TABLET, FILM COATED ORAL at 17:27

## 2019-01-01 RX ADMIN — SODIUM CHLORIDE 500 ML: 9 INJECTION, SOLUTION INTRAVENOUS at 17:05

## 2019-01-01 RX ADMIN — ACETAMINOPHEN 650 MG: 325 TABLET, FILM COATED ORAL at 09:43

## 2019-01-01 RX ADMIN — DILTIAZEM HYDROCHLORIDE 60 MG: 60 TABLET, FILM COATED ORAL at 04:26

## 2019-01-01 RX ADMIN — GABAPENTIN 100 MG: 100 CAPSULE ORAL at 00:44

## 2019-01-01 RX ADMIN — SENNOSIDES 1 TABLET: 8.6 TABLET, FILM COATED ORAL at 21:24

## 2019-01-01 RX ADMIN — HYDROCODONE BITARTRATE AND ACETAMINOPHEN 1 TABLET: 5; 325 TABLET ORAL at 08:46

## 2019-01-01 RX ADMIN — ONDANSETRON 4 MG: 2 INJECTION INTRAMUSCULAR; INTRAVENOUS at 19:06

## 2019-01-01 RX ADMIN — SENNOSIDES AND DOCUSATE SODIUM 2 TABLET: 8.6; 5 TABLET ORAL at 22:06

## 2019-01-01 RX ADMIN — SERTRALINE HYDROCHLORIDE 50 MG: 50 TABLET ORAL at 14:57

## 2019-01-01 RX ADMIN — METOPROLOL TARTRATE 5 MG: 5 INJECTION INTRAVENOUS at 12:33

## 2019-01-01 RX ADMIN — METOPROLOL TARTRATE 25 MG: 25 TABLET ORAL at 12:43

## 2019-01-01 RX ADMIN — SODIUM CHLORIDE: 9 INJECTION, SOLUTION INTRAVENOUS at 01:22

## 2019-01-01 RX ADMIN — ONDANSETRON 4 MG: 2 INJECTION INTRAMUSCULAR; INTRAVENOUS at 12:35

## 2019-01-01 RX ADMIN — ATORVASTATIN CALCIUM 20 MG: 20 TABLET, FILM COATED ORAL at 08:46

## 2019-01-01 RX ADMIN — ASPIRIN 81 MG 81 MG: 81 TABLET ORAL at 08:46

## 2019-01-01 RX ADMIN — ACETAMINOPHEN 650 MG: 325 TABLET, FILM COATED ORAL at 14:23

## 2019-01-01 RX ADMIN — APIXABAN 5 MG: 5 TABLET, FILM COATED ORAL at 14:55

## 2019-01-01 RX ADMIN — SODIUM CHLORIDE: 9 INJECTION, SOLUTION INTRAVENOUS at 20:48

## 2019-01-01 RX ADMIN — DEXTROSE MONOHYDRATE 0.5 MG/MIN: 5 INJECTION, SOLUTION INTRAVENOUS at 11:46

## 2019-01-01 RX ADMIN — DIGOXIN 250 MCG: 0.25 INJECTION INTRAMUSCULAR; INTRAVENOUS at 14:47

## 2019-01-01 RX ADMIN — METOPROLOL TARTRATE 2.5 MG: 1 INJECTION, SOLUTION INTRAVENOUS at 16:59

## 2019-01-01 RX ADMIN — ATORVASTATIN CALCIUM 20 MG: 20 TABLET, FILM COATED ORAL at 08:53

## 2019-01-01 RX ADMIN — DIGOXIN 125 MCG: 125 TABLET ORAL at 14:55

## 2019-01-01 RX ADMIN — ACETAMINOPHEN 650 MG: 325 TABLET, FILM COATED ORAL at 19:13

## 2019-01-01 RX ADMIN — ASPIRIN 81 MG 162 MG: 81 TABLET ORAL at 08:53

## 2019-01-01 RX ADMIN — SERTRALINE HYDROCHLORIDE 25 MG: 25 TABLET ORAL at 08:28

## 2019-01-01 RX ADMIN — SENNOSIDES 1 TABLET: 8.6 TABLET, FILM COATED ORAL at 10:21

## 2019-01-01 RX ADMIN — METOPROLOL TARTRATE 25 MG: 25 TABLET ORAL at 09:41

## 2019-01-01 RX ADMIN — CLONIDINE HYDROCHLORIDE 0.2 MG: 0.2 TABLET ORAL at 20:36

## 2019-01-01 RX ADMIN — SENNOSIDES 1 TABLET: 8.6 TABLET, FILM COATED ORAL at 09:17

## 2019-01-01 RX ADMIN — ASPIRIN 81 MG 162 MG: 81 TABLET ORAL at 09:16

## 2019-01-01 RX ADMIN — ASPIRIN 81 MG 81 MG: 81 TABLET ORAL at 08:59

## 2019-01-01 RX ADMIN — ACETAMINOPHEN 650 MG: 325 TABLET, FILM COATED ORAL at 09:17

## 2019-01-01 RX ADMIN — ACETAMINOPHEN 650 MG: 325 TABLET, FILM COATED ORAL at 00:43

## 2019-01-01 RX ADMIN — ACETAMINOPHEN 650 MG: 325 TABLET, FILM COATED ORAL at 13:23

## 2019-01-01 RX ADMIN — SODIUM CHLORIDE 100 ML: 9 INJECTION, SOLUTION INTRAVENOUS at 10:52

## 2019-01-01 RX ADMIN — APIXABAN 5 MG: 5 TABLET, FILM COATED ORAL at 00:44

## 2019-01-01 RX ADMIN — AMIODARONE HYDROCHLORIDE 150 MG: 1.5 INJECTION, SOLUTION INTRAVENOUS at 22:45

## 2019-01-01 RX ADMIN — ACETAMINOPHEN 650 MG: 325 TABLET, FILM COATED ORAL at 12:39

## 2019-01-01 RX ADMIN — ATORVASTATIN CALCIUM 20 MG: 20 TABLET, FILM COATED ORAL at 09:17

## 2019-01-01 RX ADMIN — METOPROLOL TARTRATE 25 MG: 25 TABLET ORAL at 19:13

## 2019-01-01 RX ADMIN — ACETAMINOPHEN 650 MG: 325 TABLET, FILM COATED ORAL at 14:20

## 2019-01-01 RX ADMIN — HYDROCODONE BITARTRATE AND ACETAMINOPHEN 1 TABLET: 5; 325 TABLET ORAL at 21:23

## 2019-01-01 RX ADMIN — SENNOSIDES 1 TABLET: 8.6 TABLET, FILM COATED ORAL at 07:55

## 2019-01-01 RX ADMIN — DIGOXIN 125 MCG: 125 TABLET ORAL at 08:26

## 2019-01-01 RX ADMIN — ACETAMINOPHEN 650 MG: 325 TABLET, FILM COATED ORAL at 20:36

## 2019-01-01 RX ADMIN — DILTIAZEM HYDROCHLORIDE 10 MG/HR: 5 INJECTION INTRAVENOUS at 16:33

## 2019-01-01 RX ADMIN — ATORVASTATIN CALCIUM 20 MG: 20 TABLET, FILM COATED ORAL at 07:54

## 2019-01-01 RX ADMIN — ACETAMINOPHEN 650 MG: 325 TABLET, FILM COATED ORAL at 07:54

## 2019-01-01 RX ADMIN — QUETIAPINE FUMARATE 25 MG: 25 TABLET ORAL at 14:59

## 2019-01-01 RX ADMIN — Medication 1 MG: at 22:47

## 2019-01-01 RX ADMIN — ASPIRIN 81 MG 81 MG: 81 TABLET ORAL at 10:22

## 2019-01-01 RX ADMIN — GABAPENTIN 100 MG: 100 CAPSULE ORAL at 21:59

## 2019-01-01 RX ADMIN — ACETAMINOPHEN 650 MG: 325 TABLET, FILM COATED ORAL at 22:00

## 2019-01-01 RX ADMIN — SERTRALINE HYDROCHLORIDE 50 MG: 50 TABLET ORAL at 08:46

## 2019-01-01 RX ADMIN — Medication 3100 UNITS: at 07:45

## 2019-01-01 RX ADMIN — DILTIAZEM HYDROCHLORIDE 10 MG: 5 INJECTION INTRAVENOUS at 16:27

## 2019-01-01 RX ADMIN — CLONIDINE HYDROCHLORIDE 0.1 MG: 0.1 TABLET ORAL at 21:23

## 2019-01-01 RX ADMIN — DILTIAZEM HYDROCHLORIDE 5 MG/HR: 5 INJECTION INTRAVENOUS at 18:14

## 2019-01-01 RX ADMIN — HEPARIN SODIUM 650 UNITS/HR: 10000 INJECTION, SOLUTION INTRAVENOUS at 07:50

## 2019-01-01 RX ADMIN — ASPIRIN 81 MG 81 MG: 81 TABLET ORAL at 08:26

## 2019-01-01 RX ADMIN — LISINOPRIL 2.5 MG: 2.5 TABLET ORAL at 08:27

## 2019-01-01 RX ADMIN — ONDANSETRON 4 MG: 2 INJECTION INTRAMUSCULAR; INTRAVENOUS at 10:18

## 2019-01-01 RX ADMIN — SODIUM CHLORIDE, POTASSIUM CHLORIDE, SODIUM LACTATE AND CALCIUM CHLORIDE 500 ML: 600; 310; 30; 20 INJECTION, SOLUTION INTRAVENOUS at 12:32

## 2019-01-01 RX ADMIN — CLONIDINE HYDROCHLORIDE 0.1 MG: 0.1 TABLET ORAL at 00:46

## 2019-01-01 RX ADMIN — APIXABAN 5 MG: 5 TABLET, FILM COATED ORAL at 08:46

## 2019-01-01 RX ADMIN — QUETIAPINE FUMARATE 25 MG: 25 TABLET ORAL at 21:59

## 2019-01-01 RX ADMIN — Medication 12.5 MG: at 10:45

## 2019-01-01 RX ADMIN — ACETAMINOPHEN 650 MG: 325 TABLET, FILM COATED ORAL at 11:36

## 2019-01-01 RX ADMIN — OMEPRAZOLE 20 MG: 20 CAPSULE, DELAYED RELEASE ORAL at 07:08

## 2019-01-01 RX ADMIN — POLYETHYLENE GLYCOL 3350 17 G: 17 POWDER, FOR SOLUTION ORAL at 08:53

## 2019-01-01 RX ADMIN — GABAPENTIN 100 MG: 100 CAPSULE ORAL at 22:43

## 2019-01-01 RX ADMIN — SERTRALINE HYDROCHLORIDE 50 MG: 50 TABLET ORAL at 08:59

## 2019-01-01 RX ADMIN — DILTIAZEM HYDROCHLORIDE 60 MG: 60 TABLET, FILM COATED ORAL at 13:47

## 2019-01-01 RX ADMIN — ALUMINA, MAGNESIA, AND SIMETHICONE 20 ML: 2400; 2400; 240 SUSPENSION ORAL at 18:07

## 2019-01-01 RX ADMIN — APIXABAN 5 MG: 5 TABLET, FILM COATED ORAL at 22:06

## 2019-01-01 RX ADMIN — OSELTAMIVIR PHOSPHATE 30 MG: 30 CAPSULE ORAL at 21:24

## 2019-01-01 RX ADMIN — SODIUM CHLORIDE, POTASSIUM CHLORIDE, SODIUM LACTATE AND CALCIUM CHLORIDE 500 ML: 600; 310; 30; 20 INJECTION, SOLUTION INTRAVENOUS at 22:12

## 2019-01-01 RX ADMIN — SODIUM CHLORIDE, POTASSIUM CHLORIDE, SODIUM LACTATE AND CALCIUM CHLORIDE 1000 ML: 600; 310; 30; 20 INJECTION, SOLUTION INTRAVENOUS at 10:17

## 2019-01-01 RX ADMIN — HEPARIN SODIUM 14 UNITS/KG/HR: 10000 INJECTION, SOLUTION INTRAVENOUS at 14:28

## 2019-01-01 RX ADMIN — CLONIDINE HYDROCHLORIDE 0.2 MG: 0.2 TABLET ORAL at 21:59

## 2019-01-01 RX ADMIN — DILTIAZEM HYDROCHLORIDE 60 MG: 60 TABLET, FILM COATED ORAL at 20:36

## 2019-01-01 RX ADMIN — CLONIDINE HYDROCHLORIDE 0.2 MG: 0.2 TABLET ORAL at 08:27

## 2019-01-01 RX ADMIN — DILTIAZEM HYDROCHLORIDE 60 MG: 60 TABLET, FILM COATED ORAL at 14:20

## 2019-01-01 RX ADMIN — METOPROLOL TARTRATE 25 MG: 25 TABLET ORAL at 20:36

## 2019-01-01 RX ADMIN — GABAPENTIN 100 MG: 100 CAPSULE ORAL at 22:06

## 2019-01-01 RX ADMIN — DILTIAZEM HYDROCHLORIDE 60 MG: 60 TABLET, FILM COATED ORAL at 19:13

## 2019-01-01 RX ADMIN — GABAPENTIN 100 MG: 100 CAPSULE ORAL at 21:40

## 2019-01-01 RX ADMIN — OSELTAMIVIR PHOSPHATE 30 MG: 30 CAPSULE ORAL at 08:59

## 2019-01-01 RX ADMIN — ACETAMINOPHEN 650 MG: 325 TABLET, FILM COATED ORAL at 14:29

## 2019-01-01 RX ADMIN — DILTIAZEM HYDROCHLORIDE 60 MG: 60 TABLET, FILM COATED ORAL at 08:00

## 2019-01-01 RX ADMIN — POLYETHYLENE GLYCOL 3350 17 G: 17 POWDER, FOR SOLUTION ORAL at 09:17

## 2019-01-01 RX ADMIN — OSELTAMIVIR PHOSPHATE 30 MG: 30 CAPSULE ORAL at 10:21

## 2019-01-01 RX ADMIN — ATORVASTATIN CALCIUM 20 MG: 20 TABLET, FILM COATED ORAL at 08:59

## 2019-01-01 RX ADMIN — OMEPRAZOLE 20 MG: 20 CAPSULE, DELAYED RELEASE ORAL at 10:21

## 2019-01-01 RX ADMIN — SERTRALINE HYDROCHLORIDE 75 MG: 50 TABLET ORAL at 20:37

## 2019-01-01 RX ADMIN — SERTRALINE HYDROCHLORIDE 50 MG: 50 TABLET ORAL at 10:21

## 2019-01-01 RX ADMIN — POLYETHYLENE GLYCOL 3350 17 G: 17 POWDER, FOR SOLUTION ORAL at 08:47

## 2019-01-01 RX ADMIN — DILTIAZEM HYDROCHLORIDE 60 MG: 60 TABLET, FILM COATED ORAL at 02:10

## 2019-01-01 RX ADMIN — QUETIAPINE 25 MG: 25 TABLET ORAL at 19:03

## 2019-01-01 RX ADMIN — HYDROCODONE BITARTRATE AND ACETAMINOPHEN 1 TABLET: 5; 325 TABLET ORAL at 12:39

## 2019-01-01 RX ADMIN — DEXTROSE MONOHYDRATE 0.5 MG/MIN: 5 INJECTION, SOLUTION INTRAVENOUS at 05:29

## 2019-01-01 RX ADMIN — ONDANSETRON 4 MG: 2 INJECTION INTRAMUSCULAR; INTRAVENOUS at 20:17

## 2019-01-01 RX ADMIN — OMEPRAZOLE 20 MG: 20 CAPSULE, DELAYED RELEASE ORAL at 08:46

## 2019-01-01 RX ADMIN — APIXABAN 5 MG: 5 TABLET, FILM COATED ORAL at 08:59

## 2019-01-01 RX ADMIN — SENNOSIDES 1 TABLET: 8.6 TABLET, FILM COATED ORAL at 08:57

## 2019-01-01 RX ADMIN — SERTRALINE HYDROCHLORIDE 25 MG: 25 TABLET ORAL at 09:17

## 2019-01-01 RX ADMIN — ACETAMINOPHEN 650 MG: 325 TABLET, FILM COATED ORAL at 08:27

## 2019-01-01 RX ADMIN — ACETAMINOPHEN 650 MG: 325 TABLET, FILM COATED ORAL at 08:52

## 2019-01-01 RX ADMIN — DILTIAZEM HYDROCHLORIDE 360 MG: 180 CAPSULE, COATED, EXTENDED RELEASE ORAL at 08:53

## 2019-01-01 RX ADMIN — SODIUM CHLORIDE: 9 INJECTION, SOLUTION INTRAVENOUS at 18:54

## 2019-01-01 RX ADMIN — SODIUM CHLORIDE, POTASSIUM CHLORIDE, SODIUM LACTATE AND CALCIUM CHLORIDE: 600; 310; 30; 20 INJECTION, SOLUTION INTRAVENOUS at 07:47

## 2019-01-01 RX ADMIN — SERTRALINE HYDROCHLORIDE 75 MG: 50 TABLET ORAL at 08:34

## 2019-01-01 RX ADMIN — ONDANSETRON 4 MG: 2 INJECTION INTRAMUSCULAR; INTRAVENOUS at 01:53

## 2019-01-01 RX ADMIN — APIXABAN 5 MG: 5 TABLET, FILM COATED ORAL at 20:36

## 2019-01-01 RX ADMIN — DILTIAZEM HYDROCHLORIDE 60 MG: 60 TABLET, FILM COATED ORAL at 09:17

## 2019-01-01 RX ADMIN — DILTIAZEM HYDROCHLORIDE 5 MG/HR: 5 INJECTION INTRAVENOUS at 17:24

## 2019-01-01 RX ADMIN — METOPROLOL TARTRATE 2.5 MG: 5 INJECTION, SOLUTION INTRAVENOUS at 18:52

## 2019-01-01 RX ADMIN — OMEPRAZOLE 20 MG: 20 CAPSULE, DELAYED RELEASE ORAL at 08:27

## 2019-01-01 RX ADMIN — ACETAMINOPHEN 650 MG: 325 TABLET, FILM COATED ORAL at 22:04

## 2019-01-01 RX ADMIN — SODIUM CHLORIDE: 9 INJECTION, SOLUTION INTRAVENOUS at 11:45

## 2019-01-01 RX ADMIN — GABAPENTIN 100 MG: 100 CAPSULE ORAL at 21:24

## 2019-01-01 RX ADMIN — OMEPRAZOLE 20 MG: 20 CAPSULE, DELAYED RELEASE ORAL at 06:04

## 2019-01-01 RX ADMIN — SENNOSIDES 1 TABLET: 8.6 TABLET, FILM COATED ORAL at 19:17

## 2019-01-01 RX ADMIN — ASPIRIN 81 MG 81 MG: 81 TABLET ORAL at 14:56

## 2019-01-01 RX ADMIN — ENOXAPARIN SODIUM 40 MG: 40 INJECTION SUBCUTANEOUS at 21:29

## 2019-01-01 RX ADMIN — CLONIDINE HYDROCHLORIDE 0.1 MG: 0.1 TABLET ORAL at 09:49

## 2019-01-01 RX ADMIN — ASPIRIN 81 MG 81 MG: 81 TABLET ORAL at 20:36

## 2019-01-01 RX ADMIN — SENNOSIDES 1 TABLET: 8.6 TABLET, FILM COATED ORAL at 21:59

## 2019-01-01 RX ADMIN — OSELTAMIVIR PHOSPHATE 30 MG: 30 CAPSULE ORAL at 08:46

## 2019-01-01 RX ADMIN — SENNOSIDES 1 TABLET: 8.6 TABLET, FILM COATED ORAL at 22:05

## 2019-01-01 RX ADMIN — METOPROLOL TARTRATE 25 MG: 25 TABLET ORAL at 21:24

## 2019-01-01 RX ADMIN — POLYETHYLENE GLYCOL 3350 17 G: 17 POWDER, FOR SOLUTION ORAL at 10:22

## 2019-01-01 RX ADMIN — SENNOSIDES 1 TABLET: 8.6 TABLET, FILM COATED ORAL at 08:46

## 2019-01-01 RX ADMIN — ASPIRIN 81 MG 162 MG: 81 TABLET ORAL at 07:55

## 2019-01-01 RX ADMIN — SENNOSIDES 1 TABLET: 8.6 TABLET, FILM COATED ORAL at 08:59

## 2019-01-01 RX ADMIN — POLYETHYLENE GLYCOL 3350 17 G: 17 POWDER, FOR SOLUTION ORAL at 08:59

## 2019-01-01 RX ADMIN — SODIUM CHLORIDE, POTASSIUM CHLORIDE, SODIUM LACTATE AND CALCIUM CHLORIDE 500 ML: 600; 310; 30; 20 INJECTION, SOLUTION INTRAVENOUS at 03:11

## 2019-01-01 RX ADMIN — LISINOPRIL 2.5 MG: 2.5 TABLET ORAL at 20:36

## 2019-01-01 RX ADMIN — ALUMINUM HYDROXIDE, MAGNESIUM HYDROXIDE, AND DIMETHICONE 20 ML: 400; 400; 40 SUSPENSION ORAL at 18:07

## 2019-01-01 RX ADMIN — IOPAMIDOL: 755 INJECTION, SOLUTION INTRAVENOUS at 10:51

## 2019-01-01 RX ADMIN — IOPAMIDOL 70 ML: 755 INJECTION, SOLUTION INTRAVENOUS at 23:20

## 2019-01-01 RX ADMIN — NITROGLYCERIN 0.4 MG: 0.4 TABLET SUBLINGUAL at 18:52

## 2019-01-01 RX ADMIN — OMEPRAZOLE 20 MG: 20 CAPSULE, DELAYED RELEASE ORAL at 07:54

## 2019-01-01 RX ADMIN — APIXABAN 5 MG: 5 TABLET, FILM COATED ORAL at 21:59

## 2019-01-01 RX ADMIN — ACETAMINOPHEN 650 MG: 325 TABLET, FILM COATED ORAL at 21:29

## 2019-01-01 ASSESSMENT — ACTIVITIES OF DAILY LIVING (ADL)
ADLS_ACUITY_SCORE: 30
ADLS_ACUITY_SCORE: 30
ADLS_ACUITY_SCORE: 29
NUMBER_OF_TIMES_PATIENT_HAS_FALLEN_WITHIN_LAST_SIX_MONTHS: 2
ADLS_ACUITY_SCORE: 30
COGNITION: 2 - DIFFICULTY WITH ORGANIZING THOUGHTS
ADLS_ACUITY_SCORE: 29
ADLS_ACUITY_SCORE: 28
ADLS_ACUITY_SCORE: 29
ADLS_ACUITY_SCORE: 31
ADLS_ACUITY_SCORE: 31
DRESS: 2-->ASSISTIVE PERSON
RETIRED_COMMUNICATION: 2-->DIFFICULTY UNDERSTANDING AND SPEAKING (NOT RELATED TO LANGUAGE BARRIER)
RETIRED_EATING: 2-->ASSISTIVE PERSON
SWALLOWING: 2-->DIFFICULTY SWALLOWING LIQUIDS
ADLS_ACUITY_SCORE: 30
ADLS_ACUITY_SCORE: 30
ADLS_ACUITY_SCORE: 31
ADLS_ACUITY_SCORE: 29
TOILETING: 2-->ASSISTIVE PERSON
ADLS_ACUITY_SCORE: 28
ADLS_ACUITY_SCORE: 30
ADLS_ACUITY_SCORE: 31
ADLS_ACUITY_SCORE: 30
ADLS_ACUITY_SCORE: 31
ADLS_ACUITY_SCORE: 30
BATHING: 2-->ASSISTIVE PERSON
ADLS_ACUITY_SCORE: 29
ADLS_ACUITY_SCORE: 31
ADLS_ACUITY_SCORE: 29
TRANSFERRING: 2-->ASSISTIVE PERSON
ADLS_ACUITY_SCORE: 30
WHICH_OF_THE_ABOVE_FUNCTIONAL_RISKS_HAD_A_RECENT_ONSET_OR_CHANGE?: AMBULATION
AMBULATION: 2-->ASSISTIVE PERSON
ADLS_ACUITY_SCORE: 29
ADLS_ACUITY_SCORE: 28
ADLS_ACUITY_SCORE: 31
ADLS_ACUITY_SCORE: 29
ADLS_ACUITY_SCORE: 29
FALL_HISTORY_WITHIN_LAST_SIX_MONTHS: YES
ADLS_ACUITY_SCORE: 31
ADLS_ACUITY_SCORE: 29
ADLS_ACUITY_SCORE: 29
ADLS_ACUITY_SCORE: 17.5
ADLS_ACUITY_SCORE: 31
ADLS_ACUITY_SCORE: 30
ADLS_ACUITY_SCORE: 30
ADLS_ACUITY_SCORE: 28
ADLS_ACUITY_SCORE: 29

## 2019-01-01 ASSESSMENT — PAIN DESCRIPTION - DESCRIPTORS
DESCRIPTORS: ACHING;DISCOMFORT;DULL
DESCRIPTORS: ACHING
DESCRIPTORS: ACHING

## 2019-01-01 ASSESSMENT — MIFFLIN-ST. JEOR
SCORE: 1099.63
SCORE: 1083.63
SCORE: 1099.63
SCORE: 1018.38
SCORE: 1103.63
SCORE: 1042.38
SCORE: 1009.25
SCORE: 1106.63
SCORE: 1089.65
SCORE: 1066.63
SCORE: 1062.43

## 2019-01-01 ASSESSMENT — ENCOUNTER SYMPTOMS
NEUROLOGICAL NEGATIVE: 1
ALLERGIC/IMMUNOLOGIC NEGATIVE: 1
GASTROINTESTINAL NEGATIVE: 1
ENDOCRINE NEGATIVE: 1
RESPIRATORY NEGATIVE: 1
CONFUSION: 1
EYES NEGATIVE: 1
CARDIOVASCULAR NEGATIVE: 1
HEMATOLOGIC/LYMPHATIC NEGATIVE: 1

## 2019-01-02 ENCOUNTER — NURSING HOME VISIT (OUTPATIENT)
Dept: GERIATRICS | Facility: CLINIC | Age: 74
End: 2019-01-02
Payer: COMMERCIAL

## 2019-01-02 VITALS
RESPIRATION RATE: 18 BRPM | TEMPERATURE: 98.4 F | HEART RATE: 67 BPM | WEIGHT: 118.6 LBS | DIASTOLIC BLOOD PRESSURE: 76 MMHG | SYSTOLIC BLOOD PRESSURE: 113 MMHG | OXYGEN SATURATION: 97 %

## 2019-01-02 DIAGNOSIS — I63.422 CEREBROVASCULAR ACCIDENT (CVA) DUE TO EMBOLISM OF LEFT ANTERIOR CEREBRAL ARTERY (H): ICD-10-CM

## 2019-01-02 DIAGNOSIS — F03.90 DEMENTIA WITHOUT BEHAVIORAL DISTURBANCE, UNSPECIFIED DEMENTIA TYPE: ICD-10-CM

## 2019-01-02 DIAGNOSIS — I48.91 ATRIAL FIBRILLATION WITH RAPID VENTRICULAR RESPONSE (H): ICD-10-CM

## 2019-01-02 DIAGNOSIS — R41.89 COGNITIVE IMPAIRMENT: ICD-10-CM

## 2019-01-02 DIAGNOSIS — I62.9 INTRACRANIAL BLEED (H): Primary | ICD-10-CM

## 2019-01-02 DIAGNOSIS — F32.1 CURRENT MODERATE EPISODE OF MAJOR DEPRESSIVE DISORDER, UNSPECIFIED WHETHER RECURRENT (H): ICD-10-CM

## 2019-01-02 PROCEDURE — 99309 SBSQ NF CARE MODERATE MDM 30: CPT | Performed by: NURSE PRACTITIONER

## 2019-01-02 RX ORDER — SENNOSIDES 8.6 MG
1 TABLET ORAL 2 TIMES DAILY
COMMUNITY
End: 2019-02-03

## 2019-01-02 RX ORDER — SERTRALINE HYDROCHLORIDE 25 MG/1
25 TABLET, FILM COATED ORAL DAILY
COMMUNITY
End: 2019-02-03

## 2019-01-02 NOTE — LETTER
"    1/2/2019        RE: Citlalli Villarreal  [address] [facility] [address]          [facility]    Chief Complaint   Patient presents with     group home Regulatory       [facility] Medical Record Number:  [id number]  Place of Service where encounter took place:  [facility]  ([facility]) [[id number]]    HPI:    Citlalli Villarreal is a 73 year old  (1945), who is being seen today for a federally mandated E/M visit.  HPI information obtained from: facility chart records, facility staff, patient report and [facility] chart review.     Today's concerns are:     Intracranial bleed (H)  Cerebrovascular accident (CVA) due to embolism of left anterior cerebral artery (H)  Cognitive impairment  Dementia without behavioral disturbance, unspecified dementia type  Current moderate episode of major depressive disorder, unspecified whether recurrent (H)  Atrial fibrillation with rapid ventricular response (H)     Citlalli reports no new concerns but is very tearful and frustrated today.  She feels as if her life is over and she has no further reason to live.  She has no plan to end her life. She would like to return to her home, but it is unfit to live in and she is no longer able to care for herself in the home.  She reports she is extremely angry at her children for \"abandoning her\", reports they do not visit and do not seem to care.      ALLERGIES: Patient has no known allergies.  PAST MEDICAL HISTORY:  has a past medical history of Cerebral infarction (H) and Right wrist fracture (10/2018). She also has no past medical history of Thyroid disease.  PAST SURGICAL HISTORY:  has no past surgical history on file.  FAMILY HISTORY: Family history is unknown by patient.  SOCIAL HISTORY:  has an unknown smoking status. she has never used smokeless tobacco.    MEDICATIONS:  Current Outpatient Medications   Medication Sig Dispense Refill     ACETAMINOPHEN PO Take 650 mg by mouth 3 times daily And give 650 " mg PO TID PRN for pain 1-3 out of 10       ASPIRIN PO Take 81 mg by mouth daily       ATORVASTATIN CALCIUM PO Take 20 mg by mouth daily       diltiazem (CARDIZEM) 120 MG tablet Take 3 tablets (360 mg) by mouth daily 270 tablet 3     GABAPENTIN PO Take 100 mg by mouth At Bedtime       Multiple Vitamins-Minerals (MULTIVITAL-M PO) Take 1 tablet by mouth daily       OMEPRAZOLE PO Take 20 mg by mouth daily       polyethylene glycol (MIRALAX/GLYCOLAX) packet Take 1 packet by mouth daily       sennosides (SENOKOT) 8.6 MG tablet Take 1 tablet by mouth 2 times daily       sertraline (ZOLOFT) 25 MG tablet Take 25 mg by mouth daily       Medications reviewed:  Medications reconciled to facility chart and changes were made to reflect current medications as identified as above med list. Below are the changes that were made:   Medications stopped since last EPIC medication reconciliation:   Medications Discontinued During This Encounter   Medication Reason     HYDROcodone-acetaminophen (NORCO) 7.5-325 MG per tablet Medication Reconciliation Clean Up       Medications started since last [facility] medication reconciliation:  Orders Placed This Encounter   Medications     sennosides (SENOKOT) 8.6 MG tablet     Sig: Take 1 tablet by mouth 2 times daily     sertraline (ZOLOFT) 25 MG tablet     Sig: Take 25 mg by mouth daily     Case Management:  I have reviewed the care plan and MDS and do agree with the plan. Patient's desire to return to the community is present, but is not able due to care needs .  Information reviewed:  Medications, vital signs, orders, and nursing notes.    ROS:  4 point ROS including Respiratory, CV, GI and , other than that noted in the HPI,  is negative    Exam:  Vitals: /76   Pulse 67   Temp 98.4  F (36.9  C)   Resp 18   Wt 53.8 kg (118 lb 9.6 oz)   SpO2 97%   BMI= There is no height or weight on file to calculate BMI.  GENERAL APPEARANCE:  Alert, thin, angry and tearful  ENT:  Mouth and posterior  oropharynx normal, moist mucous membranes  EYES:  EOM, conjunctivae, lids, pupils and irises normal  RESP:  respiratory effort and palpation of chest normal, lungs clear to auscultation , no respiratory distress  CV:  Palpation and auscultation of heart done , no edema, tachycardic about 100, rhythm regular   M/S:   Gait and station abnormal unsteady on her feet, does not use assistive device when up in her room.    SKIN:  Inspection of skin and subcutaneous tissue reveals skin is CDI  PSYCH:  oriented to self and surroundings, insight and judgement impaired, memory impaired , sad, crying, anxious, thought content paranoid and disjointed    Lab/Diagnostic data:     CBC RESULTS:   Recent Labs   Lab Test 11/26/18   WBC 9.7   RBC 4.29   HGB 13.4   HCT 42.9      MCH 31.2   MCHC 31.2*   RDW 13.7          Last Basic Metabolic Panel:  Recent Labs   Lab Test 11/26/18      POTASSIUM 4.8   CHLORIDE 105   RODOLFO 9.3   CO2 29   BUN 16   CR 0.83   GLC 98         [facility] RADIOLOGY    EXAM: CT HEAD WO CONTRAST  LOCATION: [facility]  DATE/TIME: 12/12/2018 10:03 AM    INDICATION: Intracranial hemorrhage do in 3-4 weeks and appt tf with Dr. White, neurology.  COMPARISON: 10/26/2018 and 10/14/2018 head CT.  TECHNIQUE: Routine without IV contrast. Multiplanar reformats. Dose reduction techniques were used.    FINDINGS:  Expected temporal evolution of the now chronic infarct in the left occipital lobe. Intraparenchymal hemorrhage previously seen in this area has resolved. No new foci of intraparenchymal hemorrhage elsewhere. Small focus of chronic infarction in the   anterolateral right frontal lobe is stable. Background mild scattered chronic small vessel ischemic change. Small chronic lacunar infarct in the left thalamus. Stable ventricular size in the setting of a mild diffuse intraparenchymal volume loss.   Calcification at the distal internal carotid arteries bilaterally. Calvarium intact. Rightward nasal  septal deviation. Visualized paranasal sinuses are clear. Mastoid air cells are clear. Presumed cerumen in the bilateral EAC. Visualized portion of the   orbits are unremarkable. Remainder negative.   Other Result Information   Interface, Rad Results In - 12/12/2018 11:32 AM [facility] RADIOLOGY    EXAM: CT HEAD WO CONTRAST  LOCATION: [facility]  DATE/TIME: 12/12/2018 10:03 AM    INDICATION: Intracranial hemorrhage do in 3-4 weeks and appt tf with Dr. White, neurology.  COMPARISON: 10/26/2018 and 10/14/2018 head CT.  TECHNIQUE: Routine without IV contrast. Multiplanar reformats. Dose reduction techniques were used.    FINDINGS:  Expected temporal evolution of the now chronic infarct in the left occipital lobe. Intraparenchymal hemorrhage previously seen in this area has resolved. No new foci of intraparenchymal hemorrhage elsewhere. Small focus of chronic infarction in the   anterolateral right frontal lobe is stable. Background mild scattered chronic small vessel ischemic change. Small chronic lacunar infarct in the left thalamus. Stable ventricular size in the setting of a mild diffuse intraparenchymal volume loss.   Calcification at the distal internal carotid arteries bilaterally. Calvarium intact. Rightward nasal septal deviation. Visualized paranasal sinuses are clear. Mastoid air cells are clear. Presumed cerumen in the bilateral EAC. Visualized portion of the   orbits are unremarkable. Remainder negative.    IMPRESSION:   CONCLUSION:  1.  Expected temporal evolution of the now chronic appearing infarcts in the left occipital lobe.    2.  Intraparenchymal hematoma previously seen in the left occipital lobe has resolved. No new foci of intracranial hemorrhage.    3.  Remainder of the exam is unchanged.         ASSESSMENT/PLAN  Intracranial bleed (H)  Cerebrovascular accident (CVA) due to embolism of left anterior cerebral artery (H)  Patient/Family were reluctant to return to neurology appointment,  due to difficulty with transportation and interpersonal communications (son and Citlalli).  She did have CT Scan of the Head on 12/12/18 at [facility] and results are copied above.  It appears she has resolution of the ICH, with chronic infarcts.  She has had no progression of symptoms.  Family and patient are not interested in further aggressive neurology follow up. None planned.  From review of CT scan results, it appears that this ICH has resolved.      Cognitive impairment  Dementia without behavioral disturbance, unspecified dementia type  SLUMS 11/30, BIMS 8/15 indicating moderate cognitive impairment.  She becomes angry easily.  She is often paranoid when talking about her family/children who she feels has abandoned her.  She would like to discharge to home, but her home is unfit to live in and she reports to me that it is being torn down.  She does require some supervision and spends most of every day lying in bed and watching TV.  Maintain safe living situation, likely will need to remain in supervised living situation.      Current moderate episode of major depressive disorder, unspecified whether recurrent (H)  She is very tearful and angry, has certainly had several life changes last few months to warrant depressive symptoms.  She feels as if she has nothing to live for, but has no plans to end her life.  She was started on sertraline a few days ago, and this will likely need to be titrated, but will wait a couple of weeks to see her progress.      Atrial fibrillation with rapid ventricular response (H)  ZIO patch monitoring completed, has follow up with cardiology next week and results are not yet available.     One item for discussion at this appointment is regarding anticoagulation.  From review of CT scan, it would appear that anticoagulation would be OK. They are not planning a return trip to neurology for more follow up.  Risks versus benefits of anticoagulation must be considered, she is weak and  thin, does have risk for ongoing falls.      Her heart rhythm is much improved today, slightly tachycardic but she was also quite upset. Recent values from [facility] facility records copied below.  She does agree to see cardiology and her son plans to go that appointment with her.           Future Appointments   Date Time Provider Department Center   1/10/2019  1:30 PM Eloisa Chavez APRN CNP [facility]            Orders:  1. No new orders       Electronically signed by:  JADA Us CNP      Sincerely,        JADA Us CNP

## 2019-01-02 NOTE — PROGRESS NOTES
"  [facility]    Chief Complaint   Patient presents with     [facility] Regulatory       [facility] Medical Record Number:  [id number]  Place of Service where encounter took place:  [facility]  ([facility]) [[id number]]    HPI:    Citlalli Villarreal is a 73 year old  (1945), who is being seen today for a federally mandated E/M visit.  HPI information obtained from: facility chart records, facility staff, patient report and [facility] chart review.     Today's concerns are:     Intracranial bleed (H)  Cerebrovascular accident (CVA) due to embolism of left anterior cerebral artery (H)  Cognitive impairment  Dementia without behavioral disturbance, unspecified dementia type  Current moderate episode of major depressive disorder, unspecified whether recurrent (H)  Atrial fibrillation with rapid ventricular response (H)     Citlalli reports no new concerns but is very tearful and frustrated today.  She feels as if her life is over and she has no further reason to live.  She has no plan to end her life. She would like to return to her home, but it is unfit to live in and she is no longer able to care for herself in the home.  She reports she is extremely angry at her children for \"abandoning her\", reports they do not visit and do not seem to care.      ALLERGIES: Patient has no known allergies.  PAST MEDICAL HISTORY:  has a past medical history of Cerebral infarction (H) and Right wrist fracture (10/2018). She also has no past medical history of Thyroid disease.  PAST SURGICAL HISTORY:  has no past surgical history on file.  FAMILY HISTORY: Family history is unknown by patient.  SOCIAL HISTORY:  has an unknown smoking status. she has never used smokeless tobacco.    MEDICATIONS:  Current Outpatient Medications   Medication Sig Dispense Refill     ACETAMINOPHEN PO Take 650 mg by mouth 3 times daily And give 650 mg PO TID PRN for pain 1-3 out of 10       ASPIRIN PO Take 81 mg by mouth daily       " ATORVASTATIN CALCIUM PO Take 20 mg by mouth daily       diltiazem (CARDIZEM) 120 MG tablet Take 3 tablets (360 mg) by mouth daily 270 tablet 3     GABAPENTIN PO Take 100 mg by mouth At Bedtime       Multiple Vitamins-Minerals (MULTIVITAL-M PO) Take 1 tablet by mouth daily       OMEPRAZOLE PO Take 20 mg by mouth daily       polyethylene glycol (MIRALAX/GLYCOLAX) packet Take 1 packet by mouth daily       sennosides (SENOKOT) 8.6 MG tablet Take 1 tablet by mouth 2 times daily       sertraline (ZOLOFT) 25 MG tablet Take 25 mg by mouth daily       Medications reviewed:  Medications reconciled to facility chart and changes were made to reflect current medications as identified as above med list. Below are the changes that were made:   Medications stopped since last EPIC medication reconciliation:   Medications Discontinued During This Encounter   Medication Reason     HYDROcodone-acetaminophen (NORCO) 7.5-325 MG per tablet Medication Reconciliation Clean Up       Medications started since last [facility] medication reconciliation:  Orders Placed This Encounter   Medications     sennosides (SENOKOT) 8.6 MG tablet     Sig: Take 1 tablet by mouth 2 times daily     sertraline (ZOLOFT) 25 MG tablet     Sig: Take 25 mg by mouth daily     Case Management:  I have reviewed the care plan and MDS and do agree with the plan. Patient's desire to return to the community is present, but is not able due to care needs .  Information reviewed:  Medications, vital signs, orders, and nursing notes.    ROS:  4 point ROS including Respiratory, CV, GI and , other than that noted in the HPI,  is negative    Exam:  Vitals: /76   Pulse 67   Temp 98.4  F (36.9  C)   Resp 18   Wt 53.8 kg (118 lb 9.6 oz)   SpO2 97%   BMI= There is no height or weight on file to calculate BMI.  GENERAL APPEARANCE:  Alert, thin, angry and tearful  ENT:  Mouth and posterior oropharynx normal, moist mucous membranes  EYES:  EOM, conjunctivae, lids, pupils and  irises normal  RESP:  respiratory effort and palpation of chest normal, lungs clear to auscultation , no respiratory distress  CV:  Palpation and auscultation of heart done , no edema, tachycardic about 100, rhythm regular   M/S:   Gait and station abnormal unsteady on her feet, does not use assistive device when up in her room.    SKIN:  Inspection of skin and subcutaneous tissue reveals skin is CDI  PSYCH:  oriented to self and surroundings, insight and judgement impaired, memory impaired , sad, crying, anxious, thought content paranoid and disjointed    Lab/Diagnostic data:     CBC RESULTS:   Recent Labs   Lab Test 11/26/18   WBC 9.7   RBC 4.29   HGB 13.4   HCT 42.9      MCH 31.2   MCHC 31.2*   RDW 13.7          Last Basic Metabolic Panel:  Recent Labs   Lab Test 11/26/18      POTASSIUM 4.8   CHLORIDE 105   RODOLFO 9.3   CO2 29   BUN 16   CR 0.83   GLC 98         [facility] RADIOLOGY    EXAM: CT HEAD WO CONTRAST  LOCATION: [facility]  DATE/TIME: 12/12/2018 10:03 AM    INDICATION: Intracranial hemorrhage do in 3-4 weeks and appt tf with Dr. White, neurology.  COMPARISON: 10/26/2018 and 10/14/2018 head CT.  TECHNIQUE: Routine without IV contrast. Multiplanar reformats. Dose reduction techniques were used.    FINDINGS:  Expected temporal evolution of the now chronic infarct in the left occipital lobe. Intraparenchymal hemorrhage previously seen in this area has resolved. No new foci of intraparenchymal hemorrhage elsewhere. Small focus of chronic infarction in the   anterolateral right frontal lobe is stable. Background mild scattered chronic small vessel ischemic change. Small chronic lacunar infarct in the left thalamus. Stable ventricular size in the setting of a mild diffuse intraparenchymal volume loss.   Calcification at the distal internal carotid arteries bilaterally. Calvarium intact. Rightward nasal septal deviation. Visualized paranasal sinuses are clear. Mastoid air cells are  clear. Presumed cerumen in the bilateral EAC. Visualized portion of the   orbits are unremarkable. Remainder negative.   Other Result Information   Interface, Rad Results In - 12/12/2018 11:32 AM [facility] RADIOLOGY    EXAM: CT HEAD WO CONTRAST  LOCATION: [facility]  DATE/TIME: 12/12/2018 10:03 AM    INDICATION: Intracranial hemorrhage do in 3-4 weeks and appt tf with Dr. White, neurology.  COMPARISON: 10/26/2018 and 10/14/2018 head CT.  TECHNIQUE: Routine without IV contrast. Multiplanar reformats. Dose reduction techniques were used.    FINDINGS:  Expected temporal evolution of the now chronic infarct in the left occipital lobe. Intraparenchymal hemorrhage previously seen in this area has resolved. No new foci of intraparenchymal hemorrhage elsewhere. Small focus of chronic infarction in the   anterolateral right frontal lobe is stable. Background mild scattered chronic small vessel ischemic change. Small chronic lacunar infarct in the left thalamus. Stable ventricular size in the setting of a mild diffuse intraparenchymal volume loss.   Calcification at the distal internal carotid arteries bilaterally. Calvarium intact. Rightward nasal septal deviation. Visualized paranasal sinuses are clear. Mastoid air cells are clear. Presumed cerumen in the bilateral EAC. Visualized portion of the   orbits are unremarkable. Remainder negative.    IMPRESSION:   CONCLUSION:  1.  Expected temporal evolution of the now chronic appearing infarcts in the left occipital lobe.    2.  Intraparenchymal hematoma previously seen in the left occipital lobe has resolved. No new foci of intracranial hemorrhage.    3.  Remainder of the exam is unchanged.         ASSESSMENT/PLAN  Intracranial bleed (H)  Cerebrovascular accident (CVA) due to embolism of left anterior cerebral artery (H)  Patient/Family were reluctant to return to neurology appointment, due to difficulty with transportation and interpersonal communications (son and  Citlalli).  She did have CT Scan of the Head on 12/12/18 at [facility] and results are copied above.  It appears she has resolution of the ICH, with chronic infarcts.  She has had no progression of symptoms.  Family and patient are not interested in further aggressive neurology follow up. None planned.  From review of CT scan results, it appears that this ICH has resolved.      Cognitive impairment  Dementia without behavioral disturbance, unspecified dementia type  SLUMS 11/30, BIMS 8/15 indicating moderate cognitive impairment.  She becomes angry easily.  She is often paranoid when talking about her family/children who she feels has abandoned her.  She would like to discharge to home, but her home is unfit to live in and she reports to me that it is being torn down.  She does require some supervision and spends most of every day lying in bed and watching TV.  Maintain safe living situation, likely will need to remain in supervised living situation.      Current moderate episode of major depressive disorder, unspecified whether recurrent (H)  She is very tearful and angry, has certainly had several life changes last few months to warrant depressive symptoms.  She feels as if she has nothing to live for, but has no plans to end her life.  She was started on sertraline a few days ago, and this will likely need to be titrated, but will wait a couple of weeks to see her progress.      Atrial fibrillation with rapid ventricular response (H)  ZIO patch monitoring completed, has follow up with cardiology next week and results are not yet available.     One item for discussion at this appointment is regarding anticoagulation.  From review of CT scan, it would appear that anticoagulation would be OK. They are not planning a return trip to neurology for more follow up.  Risks versus benefits of anticoagulation must be considered, she is weak and thin, does have risk for ongoing falls.      Her heart rhythm is much improved  today, slightly tachycardic but she was also quite upset. Recent values from [facility] facility records copied below.  She does agree to see cardiology and her son plans to go that appointment with her.           Future Appointments   Date Time Provider Department Center   1/10/2019  1:30 PM Eloisa Chavez APRN CNP [facility]            Orders:  1. No new orders       Electronically signed by:  JADA Us CNP

## 2019-01-04 ENCOUNTER — TELEPHONE (OUTPATIENT)
Dept: CARDIOLOGY | Facility: CLINIC | Age: 74
End: 2019-01-04

## 2019-01-04 NOTE — TELEPHONE ENCOUNTER
"Reviewed. No change at this time. Per monitor overall good HR control with moments of RVR. Pause of 3.2 seconds during sleep hours. Watch on current therapy. Important to keep LARRY follow up to decide on neurology and anticoagulation plans.     ADDENDUM: No consent to communicate on file for clark Suárez. Per most recent note from Natasha Diaz NP (nursing home visit) on 1/2/19: \"They are not planning a return trip to neurology for more follow up.  Risks versus benefits of anticoagulation must be considered, she is weak and thin, does have risk for ongoing falls\".  Will alert Dr Bonds regarding this information. Nichol Lopez, [facility] January 4, 2019, 3:18 PM        "

## 2019-01-21 NOTE — RESULT ENCOUNTER NOTE
Results noted: persistant AFib with overall poor HR control but moments of RVR  with avg HR 82. 3 pauses occurrend the longest of which lasted 3.2 seconds (occured at 10:27pm). Pt taking diltiazem 360 mg every day. To be discussed at OV with Eloisa Chavez NP on 1/25/19. Will alert Dr Bonds to pauses

## 2019-01-22 ENCOUNTER — NURSING HOME VISIT (OUTPATIENT)
Dept: GERIATRICS | Facility: CLINIC | Age: 74
End: 2019-01-22
Payer: COMMERCIAL

## 2019-01-22 VITALS
DIASTOLIC BLOOD PRESSURE: 74 MMHG | OXYGEN SATURATION: 97 % | RESPIRATION RATE: 12 BRPM | TEMPERATURE: 97.6 F | SYSTOLIC BLOOD PRESSURE: 121 MMHG | WEIGHT: 115.8 LBS | HEART RATE: 100 BPM

## 2019-01-22 DIAGNOSIS — F03.90 DEMENTIA WITHOUT BEHAVIORAL DISTURBANCE, UNSPECIFIED DEMENTIA TYPE: ICD-10-CM

## 2019-01-22 DIAGNOSIS — I62.9 INTRACRANIAL BLEED (H): ICD-10-CM

## 2019-01-22 DIAGNOSIS — I48.20 CHRONIC ATRIAL FIBRILLATION (H): ICD-10-CM

## 2019-01-22 DIAGNOSIS — I63.422 CEREBROVASCULAR ACCIDENT (CVA) DUE TO EMBOLISM OF LEFT ANTERIOR CEREBRAL ARTERY (H): Primary | ICD-10-CM

## 2019-01-22 PROCEDURE — 99308 SBSQ NF CARE LOW MDM 20: CPT | Performed by: NURSE PRACTITIONER

## 2019-01-22 NOTE — PROGRESS NOTES
[facility]    Chief Complaint   Patient presents with     detention Acute       [facility] Record Number:  [id number]  Place of Service where encounter took place:  [facility]  ([facility]) [[id number]]    HPI:    Citlalli Villarreal is a 73 year old  (1945), who is being seen today for an episodic care visit.  HPI information obtained from: facility chart records, facility staff, patient report and [facility] chart review.    The health plan new enrollment has happened. I have reviewed the  MDS, the preventative needs,  and facility care plan. The level of care is appropriate. I have reviewed the code status/advanced directives.     Today's concern is:     Cerebrovascular accident (CVA) due to embolism of left anterior cerebral artery (H)  Dementia without behavioral disturbance, unspecified dementia type  Intracranial bleed (H)  Chronic atrial fibrillation (H)     Citlalli reports no new concerns today, no pain, no shortness of breath, reports she is sleeping well.  Moved to a new room that is more private and with a quiet roommate, and she is very happy about that.     ALLERGIES: Patient has no known allergies.  Past Medical, Surgical, Family and Social History reviewed and updated in [facility].    Current Outpatient Medications   Medication Sig Dispense Refill     ACETAMINOPHEN PO Take 650 mg by mouth 3 times daily And give 650 mg PO TID PRN for pain 1-3 out of 10       ASPIRIN PO Take 81 mg by mouth daily       ATORVASTATIN CALCIUM PO Take 20 mg by mouth daily       diltiazem (CARDIZEM) 120 MG tablet Take 3 tablets (360 mg) by mouth daily 270 tablet 3     GABAPENTIN PO Take 100 mg by mouth At Bedtime       Multiple Vitamins-Minerals (MULTIVITAL-M PO) Take 1 tablet by mouth daily       OMEPRAZOLE PO Take 20 mg by mouth daily       polyethylene glycol (MIRALAX/GLYCOLAX) packet Take 1 packet by mouth daily       sennosides (SENOKOT) 8.6 MG tablet Take 1 tablet by mouth 2 times daily        sertraline (ZOLOFT) 25 MG tablet Take 25 mg by mouth daily       Medications reviewed:  Medications reconciled to facility chart and changes were made to reflect current medications as identified as above med list. Below are the changes that were made:   Medications stopped since last EPIC medication reconciliation:   There are no discontinued medications.    Medications started since last [facility] medication reconciliation:  No orders of the defined types were placed in this encounter.    REVIEW OF SYSTEMS:  4 point ROS including Respiratory, CV, GI and , other than that noted in the HPI,  is negative    Physical Exam:  /74   Pulse 100   Temp 97.6  F (36.4  C)   Resp 12   Wt 52.5 kg (115 lb 12.8 oz)   SpO2 97%   GENERAL APPEARANCE:  Alert, in no distress, thin  RESP:  respiratory effort and palpation of chest normal, lungs clear to auscultation , no respiratory distress  CV:  Palpation and auscultation of heart done , regular rate and rhythm, no murmur, rub, or gallop, no edema  M/S:   Gait and station normal  PSYCH:  oriented to Self and surroundings, family, insight and judgement impaired, memory impaired     Recent Labs:     CBC RESULTS:   Recent Labs   Lab Test 11/26/18   WBC 9.7   RBC 4.29   HGB 13.4   HCT 42.9      MCH 31.2   MCHC 31.2*   RDW 13.7          Last Basic Metabolic Panel:  Recent Labs   Lab Test 11/26/18      POTASSIUM 4.8   CHLORIDE 105   RODOLFO 9.3   CO2 29   BUN 16   CR 0.83   GLC 98       Liver Function Studies -   Recent Labs   Lab Test 11/26/18   PROTTOTAL 6.3   ALBUMIN 3.0*   BILITOTAL 0.3   ALKPHOS 105   AST 13   ALT 9       Assessment/Plan:  Cerebrovascular accident (CVA) due to embolism of left anterior cerebral artery (H)  Intracranial bleed (H)  No new symptoms, family has made a decision not to have her return to neurology.  She did have a CT of the head on 12/12/2018 noting evolution of the chronic appearing infarct in the left occipital lobe as well as  no new intracranial hemorrhages.  She is ambulatory without device without sided weakness.     Dementia without behavioral disturbance, unspecified dementia type  MOCA 11/30 indicating moderate cognitive impairment, stable.  Maintain safe living situation and long-term care.     Chronic atrial fibrillation (H)  Sees cardiology in follow-up this week. she reports no palpitations, no chest pain, no shortness of breath.      Orders:  1. No new orders     Electronically signed by  JADA Us CNP

## 2019-01-22 NOTE — LETTER
1/22/2019        RE: Citlalli Villarreal  [facility]  [address] [facility] [address]        [facility]    Chief Complaint   Patient presents with     care home Acute       [facility] Record Number:  [id number]  Place of Service where encounter took place:  [facility]  ([facility]) [[id number]]    HPI:    Citlalli Villarreal is a 73 year old  (1945), who is being seen today for an episodic care visit.  HPI information obtained from: facility chart records, facility staff, patient report and [facility] chart review.    The health plan new enrollment has happened. I have reviewed the  MDS, the preventative needs,  and facility care plan. The level of care is appropriate. I have reviewed the code status/advanced directives.     Today's concern is:     Cerebrovascular accident (CVA) due to embolism of left anterior cerebral artery (H)  Dementia without behavioral disturbance, unspecified dementia type  Intracranial bleed (H)  Chronic atrial fibrillation (H)     Ctilalli reports no new concerns today, no pain, no shortness of breath, reports she is sleeping well.  Moved to a new room that is more private and with a quiet roommate, and she is very happy about that.     ALLERGIES: Patient has no known allergies.  Past Medical, Surgical, Family and Social History reviewed and updated in [facility].    Current Outpatient Medications   Medication Sig Dispense Refill     ACETAMINOPHEN PO Take 650 mg by mouth 3 times daily And give 650 mg PO TID PRN for pain 1-3 out of 10       ASPIRIN PO Take 81 mg by mouth daily       ATORVASTATIN CALCIUM PO Take 20 mg by mouth daily       diltiazem (CARDIZEM) 120 MG tablet Take 3 tablets (360 mg) by mouth daily 270 tablet 3     GABAPENTIN PO Take 100 mg by mouth At Bedtime       Multiple Vitamins-Minerals (MULTIVITAL-M PO) Take 1 tablet by mouth daily       OMEPRAZOLE PO Take 20 mg by mouth daily       polyethylene glycol (MIRALAX/GLYCOLAX)  packet Take 1 packet by mouth daily       sennosides (SENOKOT) 8.6 MG tablet Take 1 tablet by mouth 2 times daily       sertraline (ZOLOFT) 25 MG tablet Take 25 mg by mouth daily       Medications reviewed:  Medications reconciled to facility chart and changes were made to reflect current medications as identified as above med list. Below are the changes that were made:   Medications stopped since last EPIC medication reconciliation:   There are no discontinued medications.    Medications started since last [facility] medication reconciliation:  No orders of the defined types were placed in this encounter.    REVIEW OF SYSTEMS:  4 point ROS including Respiratory, CV, GI and , other than that noted in the HPI,  is negative    Physical Exam:  /74   Pulse 100   Temp 97.6  F (36.4  C)   Resp 12   Wt 52.5 kg (115 lb 12.8 oz)   SpO2 97%   GENERAL APPEARANCE:  Alert, in no distress, thin  RESP:  respiratory effort and palpation of chest normal, lungs clear to auscultation , no respiratory distress  CV:  Palpation and auscultation of heart done , regular rate and rhythm, no murmur, rub, or gallop, no edema  M/S:   Gait and station normal  PSYCH:  oriented to Self and surroundings, family, insight and judgement impaired, memory impaired     Recent Labs:     CBC RESULTS:   Recent Labs   Lab Test 11/26/18   WBC 9.7   RBC 4.29   HGB 13.4   HCT 42.9      MCH 31.2   MCHC 31.2*   RDW 13.7          Last Basic Metabolic Panel:  Recent Labs   Lab Test 11/26/18      POTASSIUM 4.8   CHLORIDE 105   RODOLFO 9.3   CO2 29   BUN 16   CR 0.83   GLC 98       Liver Function Studies -   Recent Labs   Lab Test 11/26/18   PROTTOTAL 6.3   ALBUMIN 3.0*   BILITOTAL 0.3   ALKPHOS 105   AST 13   ALT 9       Assessment/Plan:  Cerebrovascular accident (CVA) due to embolism of left anterior cerebral artery (H)  Intracranial bleed (H)  No new symptoms, family has made a decision not to have her return to neurology.  She did have a  CT of the head on 12/12/2018 noting evolution of the chronic appearing infarct in the left occipital lobe as well as no new intracranial hemorrhages.  She is ambulatory without device without sided weakness.     Dementia without behavioral disturbance, unspecified dementia type  MOCA 11/30 indicating moderate cognitive impairment, stable.  Maintain safe living situation and long-term care.     Chronic atrial fibrillation (H)  Sees cardiology in follow-up this week. she reports no palpitations, no chest pain, no shortness of breath.      Orders:  1. No new orders     Electronically signed by  JADA Us CNP      Sincerely,        JADA Us CNP

## 2019-01-25 ENCOUNTER — OFFICE VISIT (OUTPATIENT)
Dept: CARDIOLOGY | Facility: CLINIC | Age: 74
End: 2019-01-25
Payer: COMMERCIAL

## 2019-01-25 VITALS
SYSTOLIC BLOOD PRESSURE: 130 MMHG | DIASTOLIC BLOOD PRESSURE: 80 MMHG | HEART RATE: 80 BPM | WEIGHT: 118 LBS | OXYGEN SATURATION: 97 %

## 2019-01-25 DIAGNOSIS — I48.19 PERSISTENT ATRIAL FIBRILLATION (H): Primary | ICD-10-CM

## 2019-01-25 DIAGNOSIS — I51.7 LVH (LEFT VENTRICULAR HYPERTROPHY): ICD-10-CM

## 2019-01-25 DIAGNOSIS — I10 BENIGN ESSENTIAL HYPERTENSION: ICD-10-CM

## 2019-01-25 PROCEDURE — 99214 OFFICE O/P EST MOD 30 MIN: CPT | Performed by: NURSE PRACTITIONER

## 2019-01-25 NOTE — PROGRESS NOTES
Cardiology Clinic Progress Note  Citlalli Villarreal MRN# [id number]   YOB: 1945 Age: 73 year old     Reason For Visit: Atrial fibrillation follow-up   Primary Cardiologist:   Dr. Bonds          History of Presenting Illness:    Citlalli Villarreal is a pleasant 73 year old patient with a past cardiac history significant for HTN, persistent atrial fibrillation, and LVH.  Past medical history significant for ischemic stroke with hemorrhagic transformation 10/2018 and memory impairment.     She was previously admitted to [facility] in October 2018 after being found down in her home.   It was noted that she had hit her head on the table and then the floor while trying to get up.   She was found to have atrial fibrillation with RVR, rhabdomyolysis, intracranial bleeding, and was positive for fecal occult blood testing..  It was suspected that she had ischemic infarct, possibly embolic, in the setting of atrial fibrillation with hemorrhagic transformation.  She also had severe high-grade narrowing of her intracranial vertebral and basilar arteries.  She had  70-80% stenosis of the left ICA and 60-70% stenosis of the right ICA.  After discharge, she went to TCU and while there was found unresponsive on the floor and was transferred back to [facility].   Scans were unremarkable and no new issues were noted.  She then returned to TCU before moving to [facility] in November 2018.  Of note, her home was found to be in disrepair, dirty, and cat feces with bird droppings were found throughout the home. During her admission, she was started on diltiazem, amiodarone, and digoxin.  Her echocardiogram showed normal EF with LVH, trace pericardial effusion, and some concern about possible amyloidosis.  She was started on statin therapy for her stroke  Her previous repeat head CT showed significant resolution of hemorrhage and was restarted on aspirin with plans to repeat a CT scan in a  week.  If no further hemorrhage, then she would be restarted on Plavix also.    Pt was last seen by Dr. Bonds on 12/10/2018 in consultation for atrial fibrillation and LVH. The patient continued with complaints of palpitations but he was unable to obtain an EKG that day.  She remained in atrial fibrillation and her amiodarone was discontinued as it was not converting her out of atrial fibrillation.  In regard to her LVH, the patient cannot get an MRI due to metal in the body after recent hand surgery. He recommended repeating echocardiogram in six months to see if there is any progression in her LVH.  Differential included hypertensive disease versus possible amyloidosis.  Her digoxin was discontinued in case the patient was found to have amyloid.  Given her amiodarone and digoxin were being discontinued, her diltiazem was increased to 360 mg daily and recommendation for her Zio Patch to assess rate control. The patient was going to be following up with neurology at the end of December and Dr. Bonds recommended starting Coumadin if okay with neurology. He wanted her to follow up with PCP regarding her hypertension.    Pt presents today, with her son, for atrial fibrillation follow-up.  Zio Patch monitor 12/10/2018 showing persistent atrial fibrillation with average heart rate 82, minimum 35, maximum 168, three pauses with the longest being 3.2 seconds occurring at 10:30 PM.  These results were reviewed by Dr. Bonds who noted that her atrial fibrillation was under good control with only moments of RVR and asymptomatic pauses. He recommended follow-up with him in 6-9 months.  In the meantime, decision regarding anticoagulation cannot be made without formal neurology evaluation which it seemed the patient was not interested in pursuing.  We therefore would not be able to place Watchman device either, as this requires anticoagulation postprocedure.     She has had her follow-up CT scan of the head on 12/12/2018 showing  temporal evolution of the now chronic-appearing infarcts in the left occipital lobe, prior intraparenchymal hematoma of the left occipital lobe has resolved and no new foci of intracranial hemorrhage were seen. Her son, who is here with her today was under the impression that the neurologist was going to comment on the CT results regarding whether not it was okay to restart anticoagulation.  He finds it very difficult to bring his mother to appointments given her memory impairment and at times agitated state.  Unfortunately, I do not see any notes in care everywhere regarding the neurologists recommendations.  I have asked her son to follow-up with neurology to see if they can send us a note regarding their recommendations. In discussing with the patient today, she does not wish to restart anticoagulation at this time as she does not believe she needs it.  I did review her elevated [id number] score of 5 with 7.2% risk each year for stroke.  We did also discuss that her prior CVA in October 2018 was possibly due to ischemic stroke atrial fibrillation emboli. Given her previously documented moderate memory impairment, I am unsure how much of this she understands.  I am uncertain if she makes decisions for herself any longer. Her blood pressure today in the clinic is 130/80 and is improved after increasing diltiazem.  The patient reports no further palpitations.  During our visit today, the patient became agitated, raising her voice, and stating that she does not have the ability to make decisions for herself any longer, that everyone makes decisions for her.  She also became very agitated when I asked if I could do her physical assessment.   She did allow me to auscultate her heart but not her lungs. Patient reports no chest pain, shortness of breath, PND, orthopnea, presyncope, syncope, edema, heart racing, or palpitations.      Current Cardiac Medications   Aspirin 81 mg daily  Atorvastatin 20 mg daily  Diltiazem  360 mg daily                     Assessment and Plan:     Plan  1.  Follow-up with neurology to see if it is okay to restart anticoagulation  2.  Call the clinic if your blood pressure is consistently greater than 130/80.   3.  Echo in 6 months to assess for any progression of LVH, then f/u with Dr. Bonds       1. Persistent atrial fibrillation    Palpitations imporved     Controlled rates on zio patch    Elevated [id number] score 5 for gender, age, hypertension    Continue diltiazem for rate control    No anticoagulation due to recent hemorrhagic stroke    Would need neurology follow-up with okay to restart anticoagulation      2. LVH    Seen on echocardiogram    Differential includes hypertensive disease versus possible amyloid seen on echocardiogram    Patient unable to undergo cardiac MRI due to implanted metal from hand surgery    Repeat echocardiogram in six months to assess for progression of LVH      3. hypertension    controlled    continue diltiazem         Thank you for allowing me to participate in this delightful patient's care.      This note was completed in part using Dragon voice recognition software. Although reviewed after completion, some word and grammatical errors may occur.    Eloisa Boudreaux, APRN, CNP           Data:   All laboratory data reviewed

## 2019-01-25 NOTE — PATIENT INSTRUCTIONS
Thank you for your [facility] visit today. Your provider has recommended the following:  Medication Changes:  No changes   Recommendations:  1. Follow-up with neurology about if it's ok to restart blood thinners   2. Call the clinic if your blood pressure is consistently greater than 130/80.     Follow-up:  See Dr. Bonds for cardiology follow up in 6 months with echo prior. June 2019 call in March to schedule.  Call [phone] two months prior to request date to schedule any future appointments.  Reminder:  1. Please bring in all current medications, over the counter supplements and vitamin bottles to your next appointment.               [facility]  [facility]~[address]~[place], [place]~[address]  Questions about your visit today?   Call your Cardiology Clinic RN's-Debbie Hung and/or Nichol Lopez at [phone].

## 2019-01-25 NOTE — LETTER
1/25/2019    Natasha Diaz, JADA CNP  [address]  [facility] [address]    RE: Citlalli Villarreal       Dear Colleague,    I had the pleasure of seeing Citlalli Villarreal in the [facility].    Cardiology Clinic Progress Note  Citlalli Villarreal MRN# [id number]   YOB: 1945 Age: 73 year old     Reason For Visit: Atrial fibrillation follow-up   Primary Cardiologist:   Dr. Bonds          History of Presenting Illness:    Citlalli Villarreal is a pleasant 73 year old patient with a past cardiac history significant for HTN, persistent atrial fibrillation, and LVH.  Past medical history significant for ischemic stroke with hemorrhagic transformation 10/2018 and memory impairment.     She was previously admitted to [facility] in October 2018 after being found down in her home.   It was noted that she had hit her head on the table and then the floor while trying to get up.   She was found to have atrial fibrillation with RVR, rhabdomyolysis, intracranial bleeding, and was positive for fecal occult blood testing..  It was suspected that she had ischemic infarct, possibly embolic, in the setting of atrial fibrillation with hemorrhagic transformation.  She also had severe high-grade narrowing of her intracranial vertebral and basilar arteries.  She had  70-80% stenosis of the left ICA and 60-70% stenosis of the right ICA.  After discharge, she went to TCU and while there was found unresponsive on the floor and was transferred back to [facility].   Scans were unremarkable and no new issues were noted.  She then returned to TCU before moving to [facility] in November 2018.  Of note, her home was found to be in disrepair, dirty, and cat feces with bird droppings were found throughout the home. During her admission, she was started on diltiazem, amiodarone, and digoxin.  Her echocardiogram showed normal EF with LVH, trace pericardial effusion, and some concern  about possible amyloidosis.  She was started on statin therapy for her stroke  Her previous repeat head CT showed significant resolution of hemorrhage and was restarted on aspirin with plans to repeat a CT scan in a week.  If no further hemorrhage, then she would be restarted on Plavix also.    Pt was last seen by Dr. Bonds on 12/10/2018 in consultation for atrial fibrillation and LVH. The patient continued with complaints of palpitations but he was unable to obtain an EKG that day.  She remained in atrial fibrillation and her amiodarone was discontinued as it was not converting her out of atrial fibrillation.  In regard to her LVH, the patient cannot get an MRI due to metal in the body after recent hand surgery. He recommended repeating echocardiogram in six months to see if there is any progression in her LVH.  Differential included hypertensive disease versus possible amyloidosis.  Her digoxin was discontinued in case the patient was found to have amyloid.  Given her amiodarone and digoxin were being discontinued, her diltiazem was increased to 360 mg daily and recommendation for her Zio Patch to assess rate control. The patient was going to be following up with neurology at the end of December and Dr. Bonds recommended starting Coumadin if okay with neurology. He wanted her to follow up with PCP regarding her hypertension.    Pt presents today, with her son, for atrial fibrillation follow-up.  Zio Patch monitor 12/10/2018 showing persistent atrial fibrillation with average heart rate 82, minimum 35, maximum 168, three pauses with the longest being 3.2 seconds occurring at 10:30 PM.  These results were reviewed by Dr. Bonds who noted that her atrial fibrillation was under good control with only moments of RVR and asymptomatic pauses. He recommended follow-up with him in 6-9 months.  In the meantime, decision regarding anticoagulation cannot be made without formal neurology evaluation which it seemed the patient  was not interested in pursuing.  We therefore would not be able to place Watchman device either, as this requires anticoagulation postprocedure.     She has had her follow-up CT scan of the head on 12/12/2018 showing temporal evolution of the now chronic-appearing infarcts in the left occipital lobe, prior intraparenchymal hematoma of the left occipital lobe has resolved and no new foci of intracranial hemorrhage were seen. Her son, who is here with her today was under the impression that the neurologist was going to comment on the CT results regarding whether not it was okay to restart anticoagulation.  He finds it very difficult to bring his mother to appointments given her memory impairment and at times agitated state.  Unfortunately, I do not see any notes in care everywhere regarding the neurologists recommendations.  I have asked her son to follow-up with neurology to see if they can send us a note regarding their recommendations. In discussing with the patient today, she does not wish to restart anticoagulation at this time as she does not believe she needs it.  I did review her elevated [id number] score of 5 with 7.2% risk each year for stroke.  We did also discuss that her prior CVA in October 2018 was possibly due to ischemic stroke atrial fibrillation emboli. Given her previously documented moderate memory impairment, I am unsure how much of this she understands.  I am uncertain if she makes decisions for herself any longer. Her blood pressure today in the clinic is 130/80 and is improved after increasing diltiazem.  The patient reports no further palpitations.  During our visit today, the patient became agitated, raising her voice, and stating that she does not have the ability to make decisions for herself any longer, that everyone makes decisions for her.  She also became very agitated when I asked if I could do her physical assessment.   She did allow me to auscultate her heart but not her lungs.  Patient reports no chest pain, shortness of breath, PND, orthopnea, presyncope, syncope, edema, heart racing, or palpitations.      Current Cardiac Medications   Aspirin 81 mg daily  Atorvastatin 20 mg daily  Diltiazem 360 mg daily                     Assessment and Plan:     Plan  1.  Follow-up with neurology to see if it is okay to restart anticoagulation  2.  Call the clinic if your blood pressure is consistently greater than 130/80.   3.  Echo in 6 months to assess for any progression of LVH, then f/u with Dr. Bonds       1. Persistent atrial fibrillation    Palpitations imporved     Controlled rates on zio patch    Elevated [id number] score 5 for gender, age, hypertension    Continue diltiazem for rate control    No anticoagulation due to recent hemorrhagic stroke    Would need neurology follow-up with okay to restart anticoagulation      2. LVH    Seen on echocardiogram    Differential includes hypertensive disease versus possible amyloid seen on echocardiogram    Patient unable to undergo cardiac MRI due to implanted metal from hand surgery    Repeat echocardiogram in six months to assess for progression of LVH      3. hypertension    controlled    continue diltiazem         Thank you for allowing me to participate in this delightful patient's care.      This note was completed in part using Dragon voice recognition software. Although reviewed after completion, some word and grammatical errors may occur.    Eloisa Boudreaux, APRN, CNP           Data:   All laboratory data reviewed      HPI and Plan:   See dictation    Orders Placed This Encounter   Procedures     Follow-Up with Cardiologist     Echocardiogram Limited       No orders of the defined types were placed in this encounter.      There are no discontinued medications.      Encounter Diagnoses   Name Primary?     Persistent atrial fibrillation (H) Yes     LVH (left ventricular hypertrophy)      Benign essential hypertension         CURRENT MEDICATIONS:  Current Outpatient Medications   Medication Sig Dispense Refill     ACETAMINOPHEN PO Take 650 mg by mouth 3 times daily And give 650 mg PO TID PRN for pain 1-3 out of 10       ASPIRIN PO Take 81 mg by mouth daily       ATORVASTATIN CALCIUM PO Take 20 mg by mouth daily       diltiazem (CARDIZEM) 120 MG tablet Take 3 tablets (360 mg) by mouth daily 270 tablet 3     GABAPENTIN PO Take 100 mg by mouth At Bedtime       Multiple Vitamins-Minerals (MULTIVITAL-M PO) Take 1 tablet by mouth daily       OMEPRAZOLE PO Take 20 mg by mouth daily       polyethylene glycol (MIRALAX/GLYCOLAX) packet Take 1 packet by mouth daily       sennosides (SENOKOT) 8.6 MG tablet Take 1 tablet by mouth 2 times daily       sertraline (ZOLOFT) 25 MG tablet Take 25 mg by mouth daily         ALLERGIES   No Known Allergies    PAST MEDICAL HISTORY:  Past Medical History:   Diagnosis Date     Cerebral infarction (H)      Right wrist fracture 10/2018       PAST SURGICAL HISTORY:  No past surgical history on file.    FAMILY HISTORY:  Family History   Family history unknown: Yes       SOCIAL HISTORY:  Social History     Socioeconomic History     Marital status: Single     Spouse name: None     Number of children: None     Years of education: None     Highest education level: None   Social Needs     Financial resource strain: None     Food insecurity - worry: None     Food insecurity - inability: None     Transportation needs - medical: None     Transportation needs - non-medical: None   Occupational History     None   Tobacco Use     Smoking status: Unknown If Ever Smoked     Smokeless tobacco: Never Used   Substance and Sexual Activity     Alcohol use: None     Drug use: None     Sexual activity: None   Other Topics Concern     Parent/sibling w/ CABG, MI or angioplasty before 65F 55M? Not Asked   Social History Narrative     None       Review of Systems:  Skin:  Negative       Eyes:  Positive for visual blurring;glasses     ENT:  Positive for sinus trouble    Respiratory:  Positive for shortness of breath     Cardiovascular:  Negative      Gastroenterology: Negative      Genitourinary:  Positive for urinary frequency;urgency    Musculoskeletal:  Positive for joint pain;joint swelling;joint stiffness    Neurologic:  Positive for stroke;numbness or tingling of hands;memory problems    Psychiatric:  Positive for anxiety;depression    Heme/Lymph/Imm:  Negative      Endocrine:  Negative        Physical Exam:  Vitals: /80   Pulse 80   Wt 53.5 kg (118 lb)   SpO2 97%     Constitutional:    cachectic aggitated and not cooperative     Skin:  warm and dry to the touch          Head:  no masses or lesions        Eyes:  sclera white        Lymph:      ENT:  no pallor or cyanosis        Neck:  no stiffness        Respiratory:  not assessed this visit         Cardiac: regular rhythm;normal S1 and S2 tachycardic              not assessed this visit                                        GI:  not assessed this visit        Extremities and Muscular Skeletal:  not assessed this visit              Neurological:      upset and aggitated during visit and physical exam     Psych:           CC  Nargis Bonds MD  [address]  [place] [address]                Thank you for allowing me to participate in the care of your patient.      Sincerely,     JADA Beth [facility]

## 2019-01-25 NOTE — PROGRESS NOTES
HPI and Plan:   See dictation    Orders Placed This Encounter   Procedures     Follow-Up with Cardiologist     Echocardiogram Limited       No orders of the defined types were placed in this encounter.      There are no discontinued medications.      Encounter Diagnoses   Name Primary?     Persistent atrial fibrillation (H) Yes     LVH (left ventricular hypertrophy)      Benign essential hypertension        CURRENT MEDICATIONS:  Current Outpatient Medications   Medication Sig Dispense Refill     ACETAMINOPHEN PO Take 650 mg by mouth 3 times daily And give 650 mg PO TID PRN for pain 1-3 out of 10       ASPIRIN PO Take 81 mg by mouth daily       ATORVASTATIN CALCIUM PO Take 20 mg by mouth daily       diltiazem (CARDIZEM) 120 MG tablet Take 3 tablets (360 mg) by mouth daily 270 tablet 3     GABAPENTIN PO Take 100 mg by mouth At Bedtime       Multiple Vitamins-Minerals (MULTIVITAL-M PO) Take 1 tablet by mouth daily       OMEPRAZOLE PO Take 20 mg by mouth daily       polyethylene glycol (MIRALAX/GLYCOLAX) packet Take 1 packet by mouth daily       sennosides (SENOKOT) 8.6 MG tablet Take 1 tablet by mouth 2 times daily       sertraline (ZOLOFT) 25 MG tablet Take 25 mg by mouth daily         ALLERGIES   No Known Allergies    PAST MEDICAL HISTORY:  Past Medical History:   Diagnosis Date     Cerebral infarction (H)      Right wrist fracture 10/2018       PAST SURGICAL HISTORY:  No past surgical history on file.    FAMILY HISTORY:  Family History   Family history unknown: Yes       SOCIAL HISTORY:  Social History     Socioeconomic History     Marital status: Single     Spouse name: None     Number of children: None     Years of education: None     Highest education level: None   Social Needs     Financial resource strain: None     Food insecurity - worry: None     Food insecurity - inability: None     Transportation needs - medical: None     Transportation needs - non-medical: None   Occupational History     None   Tobacco Use      Smoking status: Unknown If Ever Smoked     Smokeless tobacco: Never Used   Substance and Sexual Activity     Alcohol use: None     Drug use: None     Sexual activity: None   Other Topics Concern     Parent/sibling w/ CABG, MI or angioplasty before 65F 55M? Not Asked   Social History Narrative     None       Review of Systems:  Skin:  Negative       Eyes:  Positive for visual blurring;glasses    ENT:  Positive for sinus trouble    Respiratory:  Positive for shortness of breath     Cardiovascular:  Negative      Gastroenterology: Negative      Genitourinary:  Positive for urinary frequency;urgency    Musculoskeletal:  Positive for joint pain;joint swelling;joint stiffness    Neurologic:  Positive for stroke;numbness or tingling of hands;memory problems    Psychiatric:  Positive for anxiety;depression    Heme/Lymph/Imm:  Negative      Endocrine:  Negative        Physical Exam:  Vitals: /80   Pulse 80   Wt 53.5 kg (118 lb)   SpO2 97%     Constitutional:    cachectic aggitated and not cooperative     Skin:  warm and dry to the touch          Head:  no masses or lesions        Eyes:  sclera white        Lymph:      ENT:  no pallor or cyanosis        Neck:  no stiffness        Respiratory:  not assessed this visit         Cardiac: regular rhythm;normal S1 and S2 tachycardic              not assessed this visit                                        GI:  not assessed this visit        Extremities and Muscular Skeletal:  not assessed this visit              Neurological:      upset and aggitated during visit and physical exam     Psych:           CC  Nargis Bonds MD  [address]  [place] [id number]

## 2019-02-03 VITALS
SYSTOLIC BLOOD PRESSURE: 121 MMHG | RESPIRATION RATE: 12 BRPM | TEMPERATURE: 87.6 F | DIASTOLIC BLOOD PRESSURE: 74 MMHG | HEART RATE: 100 BPM | OXYGEN SATURATION: 97 % | WEIGHT: 120 LBS

## 2019-02-03 RX ORDER — SERTRALINE HYDROCHLORIDE 25 MG/1
75 TABLET, FILM COATED ORAL DAILY
COMMUNITY

## 2019-02-03 RX ORDER — NICOTINE POLACRILEX 4 MG/1
20 GUM, CHEWING ORAL DAILY
COMMUNITY
End: 2019-02-03

## 2019-02-03 RX ORDER — SENNOSIDES A AND B 8.6 MG/1
1 TABLET, FILM COATED ORAL 2 TIMES DAILY
COMMUNITY
End: 2020-01-01

## 2019-02-03 RX ORDER — DILTIAZEM HYDROCHLORIDE 360 MG/1
360 CAPSULE, EXTENDED RELEASE ORAL DAILY
Status: ON HOLD | COMMUNITY
End: 2019-01-01

## 2019-02-03 RX ORDER — TOLTERODINE 4 MG/1
4 CAPSULE, EXTENDED RELEASE ORAL DAILY
COMMUNITY
End: 2019-02-03

## 2019-02-03 RX ORDER — NYSTATIN 100000 [USP'U]/G
POWDER TOPICAL 2 TIMES DAILY PRN
COMMUNITY
End: 2019-02-03

## 2019-02-03 RX ORDER — CLOPIDOGREL BISULFATE 75 MG/1
75 TABLET ORAL DAILY
COMMUNITY
End: 2019-02-03

## 2019-02-03 RX ORDER — NICOTINE POLACRILEX 4 MG/1
20 GUM, CHEWING ORAL DAILY
COMMUNITY
End: 2019-03-06

## 2019-02-03 RX ORDER — GABAPENTIN 100 MG/1
100 CAPSULE ORAL AT BEDTIME
COMMUNITY
End: 2020-01-01

## 2019-02-03 RX ORDER — POLYETHYLENE GLYCOL 3350 17 G/17G
17 POWDER, FOR SOLUTION ORAL DAILY
COMMUNITY
End: 2020-01-01

## 2019-02-03 RX ORDER — LEVOTHYROXINE SODIUM 125 UG/1
125 TABLET ORAL DAILY
COMMUNITY
End: 2019-02-03

## 2019-02-03 RX ORDER — LOSARTAN POTASSIUM 25 MG/1
25 TABLET ORAL DAILY
COMMUNITY
End: 2019-02-03

## 2019-02-03 RX ORDER — ACETAMINOPHEN 325 MG/1
650 TABLET ORAL EVERY 8 HOURS PRN
COMMUNITY
End: 2019-01-01

## 2019-02-03 RX ORDER — SULFASALAZINE 500 MG/1
500 TABLET ORAL DAILY
COMMUNITY
End: 2019-02-03

## 2019-02-03 RX ORDER — METOPROLOL TARTRATE 25 MG/1
12.5 TABLET, FILM COATED ORAL 2 TIMES DAILY
COMMUNITY
End: 2019-02-03

## 2019-02-03 RX ORDER — ATORVASTATIN CALCIUM 40 MG/1
20 TABLET, FILM COATED ORAL EVERY MORNING
COMMUNITY
End: 2019-01-01

## 2019-02-03 NOTE — PROGRESS NOTES
[facility]  Chief Complaint   Patient presents with     halfway Regulatory   [facility] Medical Record Number:  [id number]  Place of Service where encounter took place:  [facility]  ([facility]) [[id number]]    HPI:    Citlalli Villarreal is a 73 year old  (1945), who is being seen today for a federally mandated E/M visit.  HPI information obtained from: facility chart records, facility staff, patient report and [facility] Epic chart review.     Today's concerns are:  - Resident seen and examined. Reports right arm is getting stronger but still cannot make a full fist. Reports appetite, sleep and BM are fine. Denies chest pain, palpitation.   - GNP and nursing staff report no concern today.   ....................................................................................  - Past Medical, social, family histories, medications, and allergies reviewed and updated  - Medications reviewed: in the chart and EHR.   - Case Management:   I have reviewed the care plan and MDS and do agree with the plan. Patient's desire to return to the community is not present.  Information reviewed:  Medications, vital signs, orders, and nursing notes.    MEDICATIONS:  Current Outpatient Medications   Medication Sig Dispense Refill     acetaminophen (TYLENOL) 325 MG tablet Take 325-650 mg by mouth 3 times daily And every 8 hours PRN       ASPIRIN PO Take 81 mg by mouth daily       atorvastatin (LIPITOR) 40 MG tablet Take 20 mg by mouth daily        diltiazem ER (TIAZAC) 360 MG 24 hr ER beaded capsule Take 360 mg by mouth daily       gabapentin (NEURONTIN) 100 MG capsule Take 100 mg by mouth At Bedtime       MULTIPLE VITAMINS-MINERALS PO Take 1 tablet by mouth daily       omeprazole 20 MG tablet Take 20 mg by mouth daily       polyethylene glycol (MIRALAX/GLYCOLAX) packet Take 17 g by mouth daily       senna (SENOKOT) 8.6 MG tablet Take 1 tablet by mouth 2 times daily       sertraline (ZOLOFT) 25 MG tablet Take  25 mg by mouth daily       ROS:  Limited secondary to cognitive impairment but today pt reports- see HPI    Exam:  Vitals: /74   Pulse 100   Temp (!) 87.6  F (30.9  C)   Resp 12   Wt 54.4 kg (120 lb)   SpO2 97%   BMI= There is no height or weight on file to calculate BMI.  GENERAL APPEARANCE:  Alert, in no distress  RESP:  respiratory effort and palpation of chest normal, lungs clear to auscultation   CV:  Palpation and auscultation of heart done , no edema, irregular rhythm . no murmur.   ABDOMEN:  normal bowel sounds, soft, nontender, no hepatosplenomegaly or other masses  M/S:   Gait and station abnormal uses WC.   SKIN:  Inspection of skin and subcutaneous tissue baseline, Palpation of skin and subcutaneous tissue baseline  NEURO:   Hand : 4/5 R, 5/5 L. DTR: triceps +2 triceps and knee  no purposeful movement in upper and lower extremities  PSYCH:  AAOx Person, place, and month (not day or year). Anxious mood.     Lab/Diagnostic data:     CBC RESULTS:   Recent Labs   Lab Test 11/26/18   WBC 9.7   RBC 4.29   HGB 13.4   HCT 42.9      MCH 31.2   MCHC 31.2*   RDW 13.7          Last Basic Metabolic Panel:  Recent Labs   Lab Test 11/26/18      POTASSIUM 4.8   CHLORIDE 105   RODOLFO 9.3   CO2 29   BUN 16   CR 0.83   GLC 98       Liver Function Studies -   Recent Labs   Lab Test 11/26/18   PROTTOTAL 6.3   ALBUMIN 3.0*   BILITOTAL 0.3   ALKPHOS 105   AST 13   ALT 9         ASSESSMENT/PLAN    Hx of Intracranial bleed (H)  Hx of CVA) due to embolism of left anterior cerebral artery (H)  - right sided weakness improving. .   - continue to follow on the consultants' recommendations.      Atrial fibrillation, chronic (H)  Carotid stenosis, bilateral  - off amiodarone a(for failing to convert to sinus) and digoxin (query amyloidosis). Zio patch showed persistent a-fib, with occasional RVR.   - today CVR, asymptomatic. On ASA.   - repeated CT scan showed resolved hemorrhage, however, Cardio wont  start OAC unless cleared by Neurosurgery/Neurology. However, it is hard for the Resident to attend more consultation given advanced dementia and agitation. Per record the stroke most likley was of an ischemic type initially which converted later to a hemorrhagic one.   - At high risk for stroke given Shyam score 5 with 7.2% risk of annual stroke. However, currently on plavix and ASA, and adding OAC in this frail elderly will increase chance of bleeding as well.  I do not see hx of CAD/stenting, and echo with no WMA. In this case, we recommend stopping ASA and plavix, and starting Eliquis 5 mg bid (less likely to cause ICH than coumadin). However, GNP first to discuss at length with family /Resident the cons and pros as outlined above.   - currently on diltiazem 360 mg. Continue to follow on Cardio's recommendations.     Hypertensive Heart Disease  BP Readings from Last 3 Encounters:   02/03/19 121/74   01/25/19 130/80   01/22/19 121/74   - better controlled since diltiazem ER dose has increased to 360 mg.     Hx of Wrist fracture, right, closed,   Closed fracture of distal end of right radius with routine healing, unspecified fracture morphology, subsequent encounter  - analgesia at  Baseline. Cannot make a right hand complete fist.      Hx of Gastrointestinal hemorrhage:   - at baseline. On omeprazole. consider changing to Protonix (less AE)     Cognitive Impairment  - SLUMS 11/30, MOCA 11/30, indicates severe  - CLQT 105/183 on memory portion and 24/37 on language portion--this indicates moderate cognitive impairment   - - Continue to anticipate needs. Chronic condition, ongoing decline expected.   -  Continue to provide redirection and reassurance as needed. Maintain safe living situation with goals focused on comfort.    Orders:  - see above recommendations, otherwise no new order and continue current management plane.     Total time spent with patient visit at the skilled nursing facility was 35 min including  patient visit, review of past records and discussing with nursing staff, reviewing NP's notes/reports. Greater than 50% of total time spent with counseling and coordinating care due to above multiple comorbidities and recommendations.     Electronically signed by:  Pasquale Johnson MD

## 2019-02-04 ENCOUNTER — NURSING HOME VISIT (OUTPATIENT)
Dept: GERIATRICS | Facility: CLINIC | Age: 74
End: 2019-02-04
Payer: COMMERCIAL

## 2019-02-04 DIAGNOSIS — I11.9 HYPERTENSIVE HEART DISEASE WITHOUT HEART FAILURE: ICD-10-CM

## 2019-02-04 DIAGNOSIS — I62.9 INTRACRANIAL BLEED (H): ICD-10-CM

## 2019-02-04 DIAGNOSIS — I48.20 CHRONIC ATRIAL FIBRILLATION (H): ICD-10-CM

## 2019-02-04 DIAGNOSIS — I65.23 CAROTID STENOSIS, ASYMPTOMATIC, BILATERAL: ICD-10-CM

## 2019-02-04 DIAGNOSIS — I63.422 CEREBROVASCULAR ACCIDENT (CVA) DUE TO EMBOLISM OF LEFT ANTERIOR CEREBRAL ARTERY (H): Primary | ICD-10-CM

## 2019-02-04 DIAGNOSIS — R41.89 COGNITIVE IMPAIRMENT: ICD-10-CM

## 2019-02-04 PROCEDURE — 99310 SBSQ NF CARE HIGH MDM 45: CPT | Performed by: FAMILY MEDICINE

## 2019-02-04 NOTE — LETTER
2/4/2019        RE: Citlalli Villarreal  [facility]  [address] [facility] [address]        [facility]  Chief Complaint   Patient presents with     penitentiary Regulatory   [place] [facility] Record Number:  [id number]  Place of Service where encounter took place:  [facility]  ([facility]) [[id number]]    HPI:    Citlalli Villarreal is a 73 year old  (1945), who is being seen today for a federally mandated E/M visit.  HPI information obtained from: facility chart records, facility staff, patient report and [facility] chart review.     Today's concerns are:  - Resident seen and examined. Reports right arm is getting stronger but still cannot make a full fist. Reports appetite, sleep and BM are fine. Denies chest pain, palpitation.   - GNP and nursing staff report no concern today.   ....................................................................................  - Past Medical, social, family histories, medications, and allergies reviewed and updated  - Medications reviewed: in the chart and EHR.   - Case Management:   I have reviewed the care plan and MDS and do agree with the plan. Patient's desire to return to the community is not present.  Information reviewed:  Medications, vital signs, orders, and nursing notes.    MEDICATIONS:  Current Outpatient Medications   Medication Sig Dispense Refill     acetaminophen (TYLENOL) 325 MG tablet Take 325-650 mg by mouth 3 times daily And every 8 hours PRN       ASPIRIN PO Take 81 mg by mouth daily       atorvastatin (LIPITOR) 40 MG tablet Take 20 mg by mouth daily        diltiazem ER (TIAZAC) 360 MG 24 hr ER beaded capsule Take 360 mg by mouth daily       gabapentin (NEURONTIN) 100 MG capsule Take 100 mg by mouth At Bedtime       MULTIPLE VITAMINS-MINERALS PO Take 1 tablet by mouth daily       omeprazole 20 MG tablet Take 20 mg by mouth daily       polyethylene glycol (MIRALAX/GLYCOLAX) packet Take 17 g by mouth daily        senna (SENOKOT) 8.6 MG tablet Take 1 tablet by mouth 2 times daily       sertraline (ZOLOFT) 25 MG tablet Take 25 mg by mouth daily       ROS:  Limited secondary to cognitive impairment but today pt reports- see HPI    Exam:  Vitals: /74   Pulse 100   Temp (!) 87.6  F (30.9  C)   Resp 12   Wt 54.4 kg (120 lb)   SpO2 97%   BMI= There is no height or weight on file to calculate BMI.  GENERAL APPEARANCE:  Alert, in no distress  RESP:  respiratory effort and palpation of chest normal, lungs clear to auscultation   CV:  Palpation and auscultation of heart done , no edema, irregular rhythm . no murmur.   ABDOMEN:  normal bowel sounds, soft, nontender, no hepatosplenomegaly or other masses  M/S:   Gait and station abnormal uses WC.   SKIN:  Inspection of skin and subcutaneous tissue baseline, Palpation of skin and subcutaneous tissue baseline  NEURO:   Hand : 4/5 R, 5/5 L. DTR: triceps +2 triceps and knee  no purposeful movement in upper and lower extremities  PSYCH:   AAOx Person, place, and month (not day or year). Anxious mood.     Lab/Diagnostic data:     CBC RESULTS:   Recent Labs   Lab Test 11/26/18   WBC 9.7   RBC 4.29   HGB 13.4   HCT 42.9      MCH 31.2   MCHC 31.2*   RDW 13.7          Last Basic Metabolic Panel:  Recent Labs   Lab Test 11/26/18      POTASSIUM 4.8   CHLORIDE 105   RODOLFO 9.3   CO2 29   BUN 16   CR 0.83   GLC 98       Liver Function Studies -   Recent Labs   Lab Test 11/26/18   PROTTOTAL 6.3   ALBUMIN 3.0*   BILITOTAL 0.3   ALKPHOS 105   AST 13   ALT 9         ASSESSMENT/PLAN    Hx of Intracranial bleed (H)  Hx of CVA) due to embolism of left anterior cerebral artery (H)  - right sided weakness improving. .   - continue to follow on the consultants' recommendations.      Atrial fibrillation, chronic (H)  Carotid stenosis, bilateral  - off amiodarone a(for failing to convert to sinus) and digoxin (query amyloidosis). Zio patch showed persistent a-fib, with  occasional RVR.   - today CVR, asymptomatic. On ASA.   - repeated CT scan showed resolved hemorrhage, however, Cardio wont start OAC unless cleared by Neurosurgery/Neurology. However, it is hard for the Resident to attend more consultation given advanced dementia and agitation. Per record the stroke most likley was of an ischemic type initially which converted later to a hemorrhagic one.   - At high risk for stroke given Shyam score 5 with 7.2% risk of annual stroke. However, currently on plavix and ASA, and adding OAC in this frail elderly will increase chance of bleeding as well.  I do not see hx of CAD/stenting, and echo with no WMA. In this case, we recommend stopping ASA and plavix, and starting Eliquis 5 mg bid (less likely to cause ICH than coumadin). However, GNP first to discuss at length with family /Resident the cons and pros as outlined above.   - currently on diltiazem 360 mg. Continue to follow on Cardio's recommendations.     Hypertensive Heart Disease  BP Readings from Last 3 Encounters:   02/03/19 121/74   01/25/19 130/80   01/22/19 121/74   - better controlled since diltiazem ER dose has increased to 360 mg.     Hx of Wrist fracture, right, closed,   Closed fracture of distal end of right radius with routine healing, unspecified fracture morphology, subsequent encounter  - analgesia at  Baseline.  Cannot make a right hand complete fist.      Hx of Gastrointestinal hemorrhage:   - at baseline. On omeprazole.  consider changing to Protonix (less AE)     Cognitive Impairment  - SLUMS 11/30, MOCA 11/30, indicates severe  - CLQT 105/183 on memory portion and 24/37 on language portion--this indicates moderate cognitive impairment   - - Continue to anticipate needs. Chronic condition, ongoing decline expected.   -  Continue to provide redirection and reassurance as needed. Maintain safe living situation with goals focused on comfort.    Orders:  - see above recommendations, otherwise no new order and  continue current management plane.     Total time spent with patient visit at the [facility] nursing facility was 35 min including patient visit, review of past records and discussing with nursing staff, reviewing NP's notes/reports. Greater than 50% of total time spent with counseling and coordinating care due to above multiple comorbidities and recommendations.     Electronically signed by:  Pasquale Johnson MD        Sincerely,        Pasquale Johnson MD

## 2019-02-09 ENCOUNTER — TELEPHONE (OUTPATIENT)
Dept: GERIATRICS | Facility: CLINIC | Age: 74
End: 2019-02-09

## 2019-02-09 NOTE — TELEPHONE ENCOUNTER
Patient with BPs 162/109 and 135/112 today. Takes Diltiazem 360 mg PO daily.   Lab Results   Component Value Date    CR 0.83 11/26/2018     PLAN  Asked staff to give patient Clonidine 0.1 mg PO now from e kit. Repeat BP manually in one hour and call if DBP still above 90    Electronically signed by JADA Metcalf, GNP

## 2019-03-06 ENCOUNTER — APPOINTMENT (OUTPATIENT)
Dept: GENERAL RADIOLOGY | Facility: CLINIC | Age: 74
End: 2019-03-06
Attending: PHYSICIAN ASSISTANT
Payer: COMMERCIAL

## 2019-03-06 ENCOUNTER — HOSPITAL ENCOUNTER (OUTPATIENT)
Facility: CLINIC | Age: 74
Setting detail: OBSERVATION
Discharge: NURSING, REHAB OR LONG-TERM CARE | End: 2019-03-07
Attending: EMERGENCY MEDICINE | Admitting: INTERNAL MEDICINE
Payer: COMMERCIAL

## 2019-03-06 DIAGNOSIS — I16.0 HYPERTENSIVE URGENCY: ICD-10-CM

## 2019-03-06 DIAGNOSIS — I48.0 PAROXYSMAL ATRIAL FIBRILLATION (H): ICD-10-CM

## 2019-03-06 DIAGNOSIS — I48.91 ATRIAL FIBRILLATION WITH RAPID VENTRICULAR RESPONSE (H): ICD-10-CM

## 2019-03-06 DIAGNOSIS — I65.23 CAROTID STENOSIS, ASYMPTOMATIC, BILATERAL: Primary | ICD-10-CM

## 2019-03-06 PROBLEM — L03.114 CELLULITIS OF LEFT FOREARM: Status: RESOLVED | Noted: 2018-10-18 | Resolved: 2019-03-06

## 2019-03-06 PROBLEM — I62.9 INTRACRANIAL BLEED (H): Status: ACTIVE | Noted: 2018-10-14
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
## 2019-03-06 PROBLEM — S62.101A WRIST FRACTURE, RIGHT, CLOSED, INITIAL ENCOUNTER: Status: RESOLVED | Noted: 2018-10-16 | Resolved: 2019-03-06

## 2019-03-06 PROBLEM — I67.2 INTRACRANIAL ATHEROSCLEROSIS: Status: ACTIVE | Noted: 2018-10-16

## 2019-03-06 PROBLEM — F03.90 DEMENTIA WITHOUT BEHAVIORAL DISTURBANCE, UNSPECIFIED DEMENTIA TYPE: Chronic | Status: ACTIVE | Noted: 2019-01-02

## 2019-03-06 PROBLEM — F32.1 CURRENT MODERATE EPISODE OF MAJOR DEPRESSIVE DISORDER, UNSPECIFIED WHETHER RECURRENT (H): Chronic | Status: ACTIVE | Noted: 2019-01-02

## 2019-03-06 PROBLEM — E78.5 HLD (HYPERLIPIDEMIA): Status: ACTIVE | Noted: 2018-10-15

## 2019-03-06 LAB
ALBUMIN SERPL-MCNC: 3.5 G/DL (ref 3.4–5)
ALBUMIN UR-MCNC: NEGATIVE MG/DL
ALP SERPL-CCNC: 109 U/L (ref 40–150)
ALT SERPL W P-5'-P-CCNC: 47 U/L (ref 0–50)
ANION GAP SERPL CALCULATED.3IONS-SCNC: 5 MMOL/L (ref 3–14)
APPEARANCE UR: ABNORMAL
AST SERPL W P-5'-P-CCNC: 43 U/L (ref 0–45)
BASOPHILS # BLD AUTO: 0.1 10E9/L (ref 0–0.2)
BASOPHILS NFR BLD AUTO: 0.9 %
BILIRUB SERPL-MCNC: 0.4 MG/DL (ref 0.2–1.3)
BILIRUB UR QL STRIP: NEGATIVE
BUN SERPL-MCNC: 22 MG/DL (ref 7–30)
CALCIUM SERPL-MCNC: 9.2 MG/DL (ref 8.5–10.1)
CHLORIDE SERPL-SCNC: 104 MMOL/L (ref 94–109)
CO2 SERPL-SCNC: 29 MMOL/L (ref 20–32)
COLOR UR AUTO: YELLOW
CREAT SERPL-MCNC: 0.84 MG/DL (ref 0.52–1.04)
DIFFERENTIAL METHOD BLD: ABNORMAL
EOSINOPHIL # BLD AUTO: 0.5 10E9/L (ref 0–0.7)
EOSINOPHIL NFR BLD AUTO: 4.1 %
ERYTHROCYTE [DISTWIDTH] IN BLOOD BY AUTOMATED COUNT: 14.3 % (ref 10–15)
GFR SERPL CREATININE-BSD FRML MDRD: 68 ML/MIN/{1.73_M2}
GLUCOSE SERPL-MCNC: 112 MG/DL (ref 70–99)
GLUCOSE UR STRIP-MCNC: NEGATIVE MG/DL
HCT VFR BLD AUTO: 45.6 % (ref 35–47)
HGB BLD-MCNC: 14.5 G/DL (ref 11.7–15.7)
HGB UR QL STRIP: NEGATIVE
HYALINE CASTS #/AREA URNS LPF: 1 /LPF (ref 0–2)
IMM GRANULOCYTES # BLD: 0.1 10E9/L (ref 0–0.4)
IMM GRANULOCYTES NFR BLD: 0.4 %
KETONES UR STRIP-MCNC: NEGATIVE MG/DL
LACTATE BLD-SCNC: 0.9 MMOL/L (ref 0.7–2)
LEUKOCYTE ESTERASE UR QL STRIP: NEGATIVE
LYMPHOCYTES # BLD AUTO: 3 10E9/L (ref 0.8–5.3)
LYMPHOCYTES NFR BLD AUTO: 25.4 %
MAGNESIUM SERPL-MCNC: 2.2 MG/DL (ref 1.6–2.3)
MCH RBC QN AUTO: 31.7 PG (ref 26.5–33)
MCHC RBC AUTO-ENTMCNC: 31.8 G/DL (ref 31.5–36.5)
MCV RBC AUTO: 100 FL (ref 78–100)
MONOCYTES # BLD AUTO: 0.7 10E9/L (ref 0–1.3)
MONOCYTES NFR BLD AUTO: 5.8 %
MRSA DNA SPEC QL NAA+PROBE: NEGATIVE
MUCOUS THREADS #/AREA URNS LPF: PRESENT /LPF
NEUTROPHILS # BLD AUTO: 7.5 10E9/L (ref 1.6–8.3)
NEUTROPHILS NFR BLD AUTO: 63.4 %
NITRATE UR QL: NEGATIVE
NRBC # BLD AUTO: 0 10*3/UL
NRBC BLD AUTO-RTO: 0 /100
NT-PROBNP SERPL-MCNC: 2976 PG/ML (ref 0–900)
PH UR STRIP: 7 PH (ref 5–7)
PLATELET # BLD AUTO: 245 10E9/L (ref 150–450)
POTASSIUM SERPL-SCNC: 4.4 MMOL/L (ref 3.4–5.3)
PROT SERPL-MCNC: 7.6 G/DL (ref 6.8–8.8)
RBC # BLD AUTO: 4.58 10E12/L (ref 3.8–5.2)
RBC #/AREA URNS AUTO: 2 /HPF (ref 0–2)
SODIUM SERPL-SCNC: 138 MMOL/L (ref 133–144)
SOURCE: ABNORMAL
SP GR UR STRIP: 1.02 (ref 1–1.03)
SPECIMEN SOURCE: NORMAL
SQUAMOUS #/AREA URNS AUTO: <1 /HPF (ref 0–1)
TROPONIN I SERPL-MCNC: 0.02 UG/L (ref 0–0.04)
TROPONIN I SERPL-MCNC: 0.03 UG/L (ref 0–0.04)
TROPONIN I SERPL-MCNC: 0.03 UG/L (ref 0–0.04)
TSH SERPL DL<=0.005 MIU/L-ACNC: 2.84 MU/L (ref 0.4–4)
UROBILINOGEN UR STRIP-MCNC: 2 MG/DL (ref 0–2)
WBC # BLD AUTO: 11.8 10E9/L (ref 4–11)
WBC #/AREA URNS AUTO: 4 /HPF (ref 0–5)

## 2019-03-06 PROCEDURE — 96375 TX/PRO/DX INJ NEW DRUG ADDON: CPT | Performed by: EMERGENCY MEDICINE

## 2019-03-06 PROCEDURE — 99220 ZZC INITIAL OBSERVATION CARE,LEVL III: CPT | Performed by: INTERNAL MEDICINE

## 2019-03-06 PROCEDURE — 96366 THER/PROPH/DIAG IV INF ADDON: CPT

## 2019-03-06 PROCEDURE — 83605 ASSAY OF LACTIC ACID: CPT | Performed by: FAMILY MEDICINE

## 2019-03-06 PROCEDURE — 25000125 ZZHC RX 250: Performed by: EMERGENCY MEDICINE

## 2019-03-06 PROCEDURE — 96376 TX/PRO/DX INJ SAME DRUG ADON: CPT | Performed by: EMERGENCY MEDICINE

## 2019-03-06 PROCEDURE — G0378 HOSPITAL OBSERVATION PER HR: HCPCS

## 2019-03-06 PROCEDURE — 96366 THER/PROPH/DIAG IV INF ADDON: CPT | Performed by: EMERGENCY MEDICINE

## 2019-03-06 PROCEDURE — 99207 ZZC CDG-CODE CATEGORY CHANGED: CPT | Performed by: INTERNAL MEDICINE

## 2019-03-06 PROCEDURE — 25000132 ZZH RX MED GY IP 250 OP 250 PS 637: Performed by: PHYSICIAN ASSISTANT

## 2019-03-06 PROCEDURE — 87641 MR-STAPH DNA AMP PROBE: CPT | Performed by: FAMILY MEDICINE

## 2019-03-06 PROCEDURE — 25000132 ZZH RX MED GY IP 250 OP 250 PS 637: Performed by: INTERNAL MEDICINE

## 2019-03-06 PROCEDURE — 96365 THER/PROPH/DIAG IV INF INIT: CPT | Performed by: EMERGENCY MEDICINE

## 2019-03-06 PROCEDURE — 83735 ASSAY OF MAGNESIUM: CPT | Performed by: INTERNAL MEDICINE

## 2019-03-06 PROCEDURE — 81001 URINALYSIS AUTO W/SCOPE: CPT | Performed by: PHYSICIAN ASSISTANT

## 2019-03-06 PROCEDURE — 83880 ASSAY OF NATRIURETIC PEPTIDE: CPT | Performed by: EMERGENCY MEDICINE

## 2019-03-06 PROCEDURE — 93005 ELECTROCARDIOGRAM TRACING: CPT | Performed by: EMERGENCY MEDICINE

## 2019-03-06 PROCEDURE — 87086 URINE CULTURE/COLONY COUNT: CPT | Performed by: PHYSICIAN ASSISTANT

## 2019-03-06 PROCEDURE — 99285 EMERGENCY DEPT VISIT HI MDM: CPT | Mod: 25 | Performed by: EMERGENCY MEDICINE

## 2019-03-06 PROCEDURE — 25000125 ZZHC RX 250: Performed by: FAMILY MEDICINE

## 2019-03-06 PROCEDURE — 93010 ELECTROCARDIOGRAM REPORT: CPT | Mod: Z6 | Performed by: EMERGENCY MEDICINE

## 2019-03-06 PROCEDURE — 36415 COLL VENOUS BLD VENIPUNCTURE: CPT | Performed by: FAMILY MEDICINE

## 2019-03-06 PROCEDURE — 93005 ELECTROCARDIOGRAM TRACING: CPT | Mod: 76 | Performed by: EMERGENCY MEDICINE

## 2019-03-06 PROCEDURE — 80053 COMPREHEN METABOLIC PANEL: CPT | Performed by: EMERGENCY MEDICINE

## 2019-03-06 PROCEDURE — 85025 COMPLETE CBC W/AUTO DIFF WBC: CPT | Performed by: EMERGENCY MEDICINE

## 2019-03-06 PROCEDURE — 36415 COLL VENOUS BLD VENIPUNCTURE: CPT | Performed by: PHYSICIAN ASSISTANT

## 2019-03-06 PROCEDURE — 93010 ELECTROCARDIOGRAM REPORT: CPT | Mod: 77 | Performed by: FAMILY MEDICINE

## 2019-03-06 PROCEDURE — 84484 ASSAY OF TROPONIN QUANT: CPT | Performed by: PHYSICIAN ASSISTANT

## 2019-03-06 PROCEDURE — 99207 ZZC APP CREDIT; MD BILLING SHARED VISIT: CPT | Performed by: PHYSICIAN ASSISTANT

## 2019-03-06 PROCEDURE — 84443 ASSAY THYROID STIM HORMONE: CPT | Performed by: EMERGENCY MEDICINE

## 2019-03-06 PROCEDURE — 84484 ASSAY OF TROPONIN QUANT: CPT | Performed by: EMERGENCY MEDICINE

## 2019-03-06 PROCEDURE — 87640 STAPH A DNA AMP PROBE: CPT | Performed by: FAMILY MEDICINE

## 2019-03-06 PROCEDURE — 71045 X-RAY EXAM CHEST 1 VIEW: CPT

## 2019-03-06 PROCEDURE — 25800030 ZZH RX IP 258 OP 636: Performed by: FAMILY MEDICINE

## 2019-03-06 RX ORDER — GABAPENTIN 100 MG/1
100 CAPSULE ORAL AT BEDTIME
Status: DISCONTINUED | OUTPATIENT
Start: 2019-03-06 | End: 2019-01-01 | Stop reason: HOSPADM

## 2019-03-06 RX ORDER — NICOTINE POLACRILEX 4 MG/1
20 GUM, CHEWING ORAL
COMMUNITY
End: 2020-01-01

## 2019-03-06 RX ORDER — DILTIAZEM HYDROCHLORIDE 5 MG/ML
10 INJECTION INTRAVENOUS ONCE
Status: COMPLETED | OUTPATIENT
Start: 2019-03-06 | End: 2019-03-06

## 2019-03-06 RX ORDER — ATORVASTATIN CALCIUM 20 MG/1
20 TABLET, FILM COATED ORAL EVERY EVENING
Status: DISCONTINUED | OUTPATIENT
Start: 2019-03-06 | End: 2019-01-01 | Stop reason: HOSPADM

## 2019-03-06 RX ORDER — GABAPENTIN 100 MG/1
100 CAPSULE ORAL AT BEDTIME
Status: DISCONTINUED | OUTPATIENT
Start: 2019-03-06 | End: 2019-03-06 | Stop reason: CLARIF

## 2019-03-06 RX ORDER — SERTRALINE HYDROCHLORIDE 25 MG/1
25 TABLET, FILM COATED ORAL DAILY
Status: DISCONTINUED | OUTPATIENT
Start: 2019-03-06 | End: 2019-01-01 | Stop reason: HOSPADM

## 2019-03-06 RX ORDER — DILTIAZEM HYDROCHLORIDE 360 MG/1
360 CAPSULE, EXTENDED RELEASE ORAL DAILY
Status: DISCONTINUED | OUTPATIENT
Start: 2019-03-06 | End: 2019-03-06 | Stop reason: CLARIF

## 2019-03-06 RX ORDER — SENNOSIDES 8.6 MG
1 TABLET ORAL 2 TIMES DAILY
Status: DISCONTINUED | OUTPATIENT
Start: 2019-03-06 | End: 2019-01-01 | Stop reason: HOSPADM

## 2019-03-06 RX ORDER — METOPROLOL TARTRATE 25 MG/1
25 TABLET, FILM COATED ORAL ONCE
Status: COMPLETED | OUTPATIENT
Start: 2019-03-06 | End: 2019-03-06

## 2019-03-06 RX ORDER — ASPIRIN 81 MG/1
81 TABLET, CHEWABLE ORAL DAILY
Status: DISCONTINUED | OUTPATIENT
Start: 2019-03-06 | End: 2019-01-01 | Stop reason: HOSPADM

## 2019-03-06 RX ORDER — CLONIDINE HYDROCHLORIDE 0.1 MG/1
0.1 TABLET ORAL 2 TIMES DAILY
Status: ON HOLD | COMMUNITY
End: 2019-01-01

## 2019-03-06 RX ORDER — DILTIAZEM HYDROCHLORIDE 180 MG/1
360 CAPSULE, COATED, EXTENDED RELEASE ORAL DAILY
Status: DISCONTINUED | OUTPATIENT
Start: 2019-03-06 | End: 2019-01-01 | Stop reason: HOSPADM

## 2019-03-06 RX ADMIN — SERTRALINE HYDROCHLORIDE 25 MG: 25 TABLET ORAL at 17:27

## 2019-03-06 RX ADMIN — GABAPENTIN 100 MG: 100 CAPSULE ORAL at 21:30

## 2019-03-06 RX ADMIN — ASPIRIN 81 MG 81 MG: 81 TABLET ORAL at 15:31

## 2019-03-06 RX ADMIN — DILTIAZEM HYDROCHLORIDE 10 MG: 5 INJECTION INTRAVENOUS at 07:11

## 2019-03-06 RX ADMIN — DILTIAZEM HYDROCHLORIDE 360 MG: 180 CAPSULE, COATED, EXTENDED RELEASE ORAL at 15:31

## 2019-03-06 RX ADMIN — METOPROLOL TARTRATE 25 MG: 25 TABLET ORAL at 18:07

## 2019-03-06 RX ADMIN — DILTIAZEM HYDROCHLORIDE 10 MG: 5 INJECTION INTRAVENOUS at 04:29

## 2019-03-06 RX ADMIN — ATORVASTATIN CALCIUM 20 MG: 20 TABLET, FILM COATED ORAL at 17:27

## 2019-03-06 RX ADMIN — DILTIAZEM HYDROCHLORIDE 5 MG/HR: 5 INJECTION INTRAVENOUS at 07:22

## 2019-03-06 RX ADMIN — METOPROLOL TARTRATE 12.5 MG: 25 TABLET, FILM COATED ORAL at 15:31

## 2019-03-06 RX ADMIN — OMEPRAZOLE 20 MG: 20 CAPSULE, DELAYED RELEASE ORAL at 15:31

## 2019-03-06 SDOH — HEALTH STABILITY: MENTAL HEALTH: HOW OFTEN DO YOU HAVE A DRINK CONTAINING ALCOHOL?: NEVER

## 2019-03-06 ASSESSMENT — ENCOUNTER SYMPTOMS
ABDOMINAL PAIN: 0
CHEST TIGHTNESS: 0
WEAKNESS: 0
HEADACHES: 0
CONFUSION: 1
LIGHT-HEADEDNESS: 0
CHILLS: 0
VOMITING: 0
BACK PAIN: 0
SHORTNESS OF BREATH: 0
DIAPHORESIS: 0
PALPITATIONS: 0
FATIGUE: 0
NAUSEA: 0
APPETITE CHANGE: 0
COUGH: 0
DYSURIA: 0
FEVER: 0
DIARRHEA: 0

## 2019-03-06 ASSESSMENT — MIFFLIN-ST. JEOR
SCORE: 1104.63
SCORE: 1086.02

## 2019-03-06 NOTE — ED PROVIDER NOTES
History     Chief Complaint   Patient presents with     Atrial Fib     HPI  Citlalli Villarreal is a 73 year old female with history of dementia, depression, coronary artery disease, and previous stroke from a intracranial bleed presenting for evaluation of chest pain tonight.  Patient reports episodes of chest pain over the past day, worse tonight.  Patient reports sharp substernal chest pressure.  Patient reports that the pain is been here all day today and yesterday with some waxing and waning but has recently become constant.  Reports she has been trying to walk around to let the pain relief but pain was persistent until she arrived here.  Denies associated nausea, diaphoresis, or dyspnea but does report the pressure seemed to get worse with breathing.  Patient was reportedly hypertensive at her nursing home and was given 0.1 mg of clonidine as well as sublingual nitro x3.  Upon arrival in the ED, patient reports her pain is improved as long she keeps her knees bent.  She currently denies any chest pressure or other acute symptoms.  Reports normal recent appetite.  No nausea or vomiting.  No diarrhea.  Chronic urinary incontinence unchanged from baseline.      ==================================================================    CHART REVIEW:      CT Head Without Megcprxd99/12/2018  [facility]  Result Impression   CONCLUSION:  1.  Expected temporal evolution of the now chronic appearing infarcts in the left occipital lobe.    2.  Intraparenchymal hematoma previously seen in the left occipital lobe has resolved. No new foci of intracranial hemorrhage.    3.  Remainder of the exam is unchanged.         ECG 12 lead nursing unit [id number]/26/2018  [facility]  Component Name Value Ref Range   SYSTOLIC BLOOD PRESSURE 168 mmHg   DIASTOLIC BLOOD PRESSURE 103 mmHg   VENTRICULAR RATE 81 BPM   ATRIAL RATE   BPM   P-R INTERVAL   ms   QRS DURATION 100 ms   Q-T INTERVAL 344 ms   QTC CALCULATION (BEZET)  399 ms   P Axis   degrees   R AXIS 41 degrees   T AXIS 212 degrees   MUSE DIAGNOSIS Atrial fibrillation  Left ventricular hypertrophy with repolarization abnormality  Abnormal ECG  When compared with ECG of 14-OCT-2018 13:24,  Vent. rate has decreased BY  87 BPM  T wave inversion less evident in Inferior leads  Confirmed by ADONIS PAZ MD LOC:[facility] ([id number]) on 10/26/2018 3:37:07 PM          END CHART REVIEW  ==================================================================      Allergies:  No Known Allergies    Problem List:    Patient Active Problem List    Diagnosis Date Noted     Current moderate episode of major depressive disorder, unspecified whether recurrent (H) 01/02/2019     Priority: Medium     Dementia without behavioral disturbance, unspecified dementia type 01/02/2019     Priority: Medium     Chronic idiopathic constipation 12/13/2018     Priority: Medium     Cognitive impairment 11/21/2018     Priority: Medium     Gastrointestinal hemorrhage, unspecified gastrointestinal hemorrhage type 11/21/2018     Priority: Medium     Fall, initial encounter 10/26/2018     Priority: Medium     Cellulitis of left forearm 10/18/2018     Priority: Medium     Altered mental status, unspecified altered mental status type 10/16/2018     Priority: Medium     Carotid stenosis, asymptomatic, bilateral 10/16/2018     Priority: Medium     Deny-neglect of right side 10/16/2018     Priority: Medium     Intracranial atherosclerosis 10/16/2018     Priority: Medium     Overview:   Severe and diffuse       Wrist fracture, right, closed, initial encounter 10/16/2018     Priority: Medium     Atrial fibrillation with rapid ventricular response (H) 10/15/2018     Priority: Medium     Compression of brain (H) 10/15/2018     Priority: Medium     HLD (hyperlipidemia) 10/15/2018     Priority: Medium     Recurrent falls 10/15/2018     Priority: Medium     Traumatic rhabdomyolysis, initial encounter (H) 10/15/2018     Priority: Medium      "Fracture of right distal radius 10/14/2018     Priority: Medium     Overview:   Added automatically from request for surgery [id number]       Intracranial bleed (H) 10/14/2018     Priority: Medium        Past Medical History:    Past Medical History:   Diagnosis Date     Atrial fibrillation (H)      Cerebral infarction (H)      Gastroesophageal reflux disease      Gastrointestinal hemorrhage      Hyperlipemia      Major depressive disorder      Mild cognitive impairment, so stated      Neurologic neglect syndrome      Neuromuscular dysfunction of bladder, unspecified      Nontraumatic intracranial hemorrhage (H)      Occlusion and stenosis of bilateral carotid arteries      Radial fracture      Repeated falls      Right wrist fracture 10/2018       Past Surgical History:    History reviewed. No pertinent surgical history.    Family History:    Family History   Family history unknown: Yes       Social History:  Marital Status:  Single [1]  Social History     Tobacco Use     Smoking status: Unknown If Ever Smoked     Smokeless tobacco: Never Used   Substance Use Topics     Alcohol use: No     Frequency: Never     Drug use: No        Medications:      acetaminophen (TYLENOL) 325 MG tablet   ASPIRIN PO   atorvastatin (LIPITOR) 40 MG tablet   cloNIDine (CATAPRES) 0.1 MG tablet   diltiazem ER (TIAZAC) 360 MG 24 hr ER beaded capsule   gabapentin (NEURONTIN) 100 MG capsule   MULTIPLE VITAMINS-MINERALS PO   omeprazole 20 MG tablet   polyethylene glycol (MIRALAX/GLYCOLAX) packet   senna (SENOKOT) 8.6 MG tablet   sertraline (ZOLOFT) 25 MG tablet         Review of Systems   Constitutional: Negative for appetite change, chills, diaphoresis, fatigue and fever.   HENT: Negative for congestion.    Respiratory: Negative for cough, chest tightness and shortness of breath.    Cardiovascular: Positive for chest pain (\"pressure only, no pain\"). Negative for palpitations and leg swelling.   Gastrointestinal: Negative for abdominal pain, " "diarrhea, nausea and vomiting.   Genitourinary: Positive for enuresis. Negative for dysuria.   Musculoskeletal: Negative for back pain.   Skin: Negative for rash.   Neurological: Negative for weakness, light-headedness and headaches.   Psychiatric/Behavioral: Positive for confusion (chronic).   All other systems reviewed and are negative.      Physical Exam   BP: (!) 182/153  Pulse: 140  Heart Rate: 151  Temp: 97.7  F (36.5  C)  Resp: 18  Height: 170.2 cm (5' 7\")  Weight: 55.3 kg (122 lb)  SpO2: 99 %      Physical Exam   Constitutional: She appears well-developed and well-nourished. No distress.   HENT:   Head: Normocephalic and atraumatic.   Mildly dry mucus membranes   Eyes: Conjunctivae are normal.   Cardiovascular: An irregularly irregular rhythm present. Tachycardia present.       Pulmonary/Chest: Effort normal. No stridor. No respiratory distress. She has no wheezes. She has no rales.   Abdominal: Soft. There is no tenderness.   Musculoskeletal: Normal range of motion.   Neurological: She is alert.   Mildly disoriented to time and place   Skin: Skin is warm and dry. Capillary refill takes less than 2 seconds. She is not diaphoretic.   Psychiatric: She has a normal mood and affect.   Nursing note and vitals reviewed.      ED Course        Procedures               EKG Interpretation:      Interpreted by Corey Pollard  Time reviewed: 2325  Symptoms at time of EKG: Chest pain  Rhythm: atrial fibrillation - rapid  Rate: 139  Axis: Normal  Ectopy: none  Conduction: normal  ST Segments/ T Waves: ST Segement Elevation aVR, V1 and V2, ST Segment Depression I, II, aVL, aVF, V4, V5 and V6 and T wave inversion I, II, III, aVF, V5 and V6  Q Waves: none  Comparison to prior: No old EKG available    Clinical Impression: Atrial fibrillation with inferolateral ST depressions and T wave inversions as well as some anterior elevation suggestive of possible ischemia             Results for orders placed or performed " during the hospital encounter of 03/06/19 (from the past 24 hour(s))   CBC with platelets differential   Result Value Ref Range    WBC 11.8 (H) 4.0 - 11.0 10e9/L    RBC Count 4.58 3.8 - 5.2 10e12/L    Hemoglobin 14.5 11.7 - 15.7 g/dL    Hematocrit 45.6 35.0 - 47.0 %     78 - 100 fl    MCH 31.7 26.5 - 33.0 pg    MCHC 31.8 31.5 - 36.5 g/dL    RDW 14.3 10.0 - 15.0 %    Platelet Count 245 150 - 450 10e9/L    Diff Method Automated Method     % Neutrophils 63.4 %    % Lymphocytes 25.4 %    % Monocytes 5.8 %    % Eosinophils 4.1 %    % Basophils 0.9 %    % Immature Granulocytes 0.4 %    Nucleated RBCs 0 0 /100    Absolute Neutrophil 7.5 1.6 - 8.3 10e9/L    Absolute Lymphocytes 3.0 0.8 - 5.3 10e9/L    Absolute Monocytes 0.7 0.0 - 1.3 10e9/L    Absolute Eosinophils 0.5 0.0 - 0.7 10e9/L    Absolute Basophils 0.1 0.0 - 0.2 10e9/L    Abs Immature Granulocytes 0.1 0 - 0.4 10e9/L    Absolute Nucleated RBC 0.0    Comprehensive metabolic panel   Result Value Ref Range    Sodium 138 133 - 144 mmol/L    Potassium 4.4 3.4 - 5.3 mmol/L    Chloride 104 94 - 109 mmol/L    Carbon Dioxide 29 20 - 32 mmol/L    Anion Gap 5 3 - 14 mmol/L    Glucose 112 (H) 70 - 99 mg/dL    Urea Nitrogen 22 7 - 30 mg/dL    Creatinine 0.84 0.52 - 1.04 mg/dL    GFR Estimate 68 >60 mL/min/[1.73_m2]    GFR Estimate If Black 79 >60 mL/min/[1.73_m2]    Calcium 9.2 8.5 - 10.1 mg/dL    Bilirubin Total 0.4 0.2 - 1.3 mg/dL    Albumin 3.5 3.4 - 5.0 g/dL    Protein Total 7.6 6.8 - 8.8 g/dL    Alkaline Phosphatase 109 40 - 150 U/L    ALT 47 0 - 50 U/L    AST 43 0 - 45 U/L   TSH with free T4 reflex   Result Value Ref Range    TSH 2.84 0.40 - 4.00 mU/L   Troponin I   Result Value Ref Range    Troponin I ES 0.028 0.000 - 0.045 ug/L     Patient Vitals for the past 24 hrs:   BP Temp Temp src Pulse Heart Rate Resp SpO2 Height Weight   03/06/19 0515 (!) 87/59 -- -- 86 93 16 97 % -- --   03/06/19 0500 104/79 -- -- 93 98 20 92 % -- --   03/06/19 0445 102/77 -- -- 123 63 15  "95 % -- --   03/06/19 0430 111/90 -- -- 133 99 15 95 % -- --   03/06/19 0425 -- -- -- -- 96 14 96 % -- --   03/06/19 0422 -- -- -- -- 110 13 96 % -- --   03/06/19 0410 -- -- -- -- 105 17 97 % -- --   03/06/19 0405 -- -- -- -- 115 13 97 % -- --   03/06/19 0400 115/53 -- -- 104 100 22 93 % -- --   03/06/19 0355 -- -- -- -- 140 21 94 % -- --   03/06/19 0350 -- -- -- -- 117 21 97 % -- --   03/06/19 0345 121/90 -- -- 138 133 24 93 % -- --   03/06/19 0340 -- -- -- -- 142 22 99 % -- --   03/06/19 0335 -- -- -- -- 118 15 98 % -- --   03/06/19 0330 (!) 147/133 -- -- 121 105 18 98 % -- --   03/06/19 0325 (!) 139/115 -- -- 110 151 24 99 % -- 55.3 kg (122 lb)   03/06/19 0319 (!) 182/153 97.7  F (36.5  C) Oral 140 -- 18 99 % 1.702 m (5' 7\") --         Medications   diltiazem (CARDIZEM) injection 10 mg (10 mg Intravenous Given 3/6/19 0429)       5:33 AM: Patient reassessed.  Resting comfortably.  I woke her to assess and she became angry.  Stating that she has been sitting here for hours and nobody has been paying attention to her.  Patient reports she needs to go the bathroom.  Advised nurse of the need for her to get to the bathroom.  Blood pressure improved.  It was transiently low but is normal now.  Patient reports no symptoms currently.  Heart rate now controlled.  Will get patient to the restroom.  Patient requesting discharge home.  She has had no recurrent episodes of chest pain while in the ED.    5:47 AM: Although she has remained asymptomatic here, I recommended a second troponin prior to discharge.  She is currently roughly 2.5 hours from arrival so will order second troponin now.  If not climbing, and patient remains asymptomatic, may be stable for discharge back to her nursing home        Assessments & Plan (with Medical Decision Making)  73-year-old female with history of atrial fibrillation, dementia, and hypertension presenting for evaluation of elevated blood pressure and chest pressure today.  Upon arrival in " the ED her chest pressure is fully resolved and she was still mildly hypertensive.  Given a single dose of diltiazem which she is on chronically with normalization of her blood pressure and some improvement in her heart rate.  She had no recurrent episodes of chest pain.  She reports her chest pressure symptoms have been present for more than a day.  Initial troponin negative.  EKG did show some ischemic changes however, possibly rate related, second troponin drawn to compare. Signed out to Dr Cuevas to f/u on trop and re-assess for disposition.     I have reviewed the nursing notes.    I have reviewed the findings, diagnosis, plan and need for follow up with the patient.          Medication List      There are no discharge medications for this visit.         Final diagnoses:   Hypertensive urgency   Paroxysmal atrial fibrillation (H)       3/6/2019   [facility] EMERGENCY DEPARTMENT     Pollard, Corey Bowen MD  03/11/19 [id number]

## 2019-03-06 NOTE — ED NOTES
RN to contact son to given update on patient events, plan for discharge. Pt son did return call and is currently on the road and 200 miles away--he will not be able to transfer patient back to NH.

## 2019-03-06 NOTE — H&P
[facility]    History and Physical - Hospitalist Service       Date of Admission:  3/6/2019    Assessment & Plan   Citlalli Villarreal is a 73 year old female admitted on 3/6/2019. She has history of ischemic CVA with hemorrhagic conversion, carotid artery stenosis, possible GI bleeding, persistent atrial fibrillation, dementia. She presents with chest pain.     Atrial Fibrillation With RVR, history of persistent atrial fibrillation  Chest pressure/tightness reported, found to be in atrial fibrillation. ECG consistent with atrial fibrillation with rapid response with nonspecific ST changes no prior comparison. History of persistent atrial fibrillation first noted on admission 10/2018 where she was found to have acute CVA. Initially on amiodarone, digoxin and diltiazem. Amiodarone was discontinued as she had not converted, digoxin was discontinued as there was LVH with concern for amyloidosis. At that time 12/2018 her diltiazem was increased to 360 mg daily and a Zio Patch at that time showed good control with average HR of 82.  Has been recommended to start anticoagulation once cleared by neurology (CHADVASc 4) though appears that family did not want to follow up in the past.   Etiology for current worsening of rate control not totally clear. Differential includes infectious vs stress related vs cardiac ischemia vs other. TSH, electrolytes normal.   - Rate control: continue diltiazem drip, start home diltiazem, order one dose of metoprolol tartrate 12.5 mg now, additional dose for this evening with parameters. Plan to likely start long acting in morning pending response.  - continue telemetry monitoring  - infectious work up: UA/urine culture, CXR  - repeat ECG/troponin as below  - defer to outpatient for further management of antiplatelet/anticoagulant    Chest pain  Elevated troponin  Suspect this pain was due to atrial fibrillation with RVR given resolution with better rate control on  admission. Troponin 0.028 on presentation. Repeat 0.028 at 6 AM. ECG shows atrial fibrillation with nonspecific ST changes. Patient did report some chest pain/pressure when rate was more rapid, none reported currently.   - repeat troponin  - repeat ECG when rate better controlled, order for AM  - ECG prn chest pain recurrence     Elevated BNP  BNP 2976. Clinically does not appear to be volume up. Last echo shows LVH, normal EF, no history of heart failure. Will work on rate control as above and no diurese currently.   - CXR as above  - monitor    History of ischemic stroke with hemorrhagic conversion 10/2018  Atherosclerotic cerebrovascular disease  Carotid artery stenosis  Per chart review, history of ischemic stroke with hemorrhagic conversion 10/2018. Started on aspirin with plan to start Plavix or Eliquis if event monitor showed atrial fibrillation instead of dual antiplatelet when cleared by neurology based on follow up head CT scan. CTA 10/2018, showed L ICA 70-80% stenosis, R ICA 60-70% stenosis and high grade atherosclerotic narrowing of vertebral and basilar arteries. Follow up CT head 12/2018 showed temporal evolution of infracts in left occipital lobe, resolved hematoma, no new intracranial hemorrage. Patient has not had follow up with neurology and per chart review family has declined further follow up in the past.   - continue home aspirin and atorvastatin   - defer to outpatient for further management of antiplatelet vs anticoagulant    Hypertension  Chronic. Blood pressures reviewed, stable.   - hold home clonidine while titrating medications for rate control, likely will discontinue on discharge  - continue home diltiazem, adding metoprolol    History of possible GI bleed  During hospitalization 10/2018 for stroke noted to have stool was positive for occult blood. Plan was for outpatient follow up with repeat fecal occult blood testing or colonoscopy.  - continue home PPI  - defer to outpatient for  "further work up /management     Cognitive Impairment, Dementia  SLUMs 11/30, OCA 11/30 per chart review. Per son Gildardo, issues with memory impairment and behavioral outburst, currently applying for guardianship though their relationship is strained.    LVH  Noted on echocardiogram, differential per cardiology includes due to hypertensive disease vs amyloid.  - continue outpatient monitoring     Diet: Low Saturated Fat Na <2400 mg    DVT Prophylaxis: Low Risk/Ambulatory with no VTE prophylaxis indicated  Silver Catheter: not present  Code Status: Full code, prior, unable to address on admission     Disposition Plan   Expected discharge: Tomorrow, recommended to prior living arrangement once heart rate under good control and work up complete.  Entered: Melissa Gaviria PA-C 03/06/2019, 2:47 PM     The patient's care was discussed with the Attending Physician, Dr. Jemal Pickens, Patient and Patient's Family.    Melissa Gaviria PA-C  [facility]  ______________________________________________________________________    Chief Complaint   Chest pressure, palpitations    History is obtained from the patient, emergency department physician and patient's son    History of Present Illness   Citlalli Villarreal is a 73 year old female who presents from [facility] with chest pressure.    History is difficult to obtain from patient due to baseline memory issues/cognitive decline. She is cooperative though easily grows upset and angry with questioning and exam. She states she did have some chest pain yesterday, unwilling to describe further. She states it has resolved now. She does not have any complaints currently \"I'm just here and I listen to what they tell me to do\". She does not have good insight to her medical history or recent hospitalization 10/2018 where she was found to have an ischemic stroke that had hemorrhagic conversion as well as previously undiagnosed atrial fibrillation " with RVR.     On presentation to the ED she did report chest pain that was present on arrival, initially intermittent though grew constant.She was reportedly hypertensive in the nursing home and given clonidine as well as nitroglycerin x 3.     She specifically denies any fever, chills, recent URI symptoms, chest pain/pressure, shortness of breath, palpitations, abdominal pain, nausea, emesis, diarrhea, dysuria, changes in urinary frequency or urgency.     Review of Systems    The 10 point Review of Systems is negative other than noted in the HPI or here.     Past Medical History    I have reviewed this patient's medical history and updated it with pertinent information if needed.   Past Medical History:   Diagnosis Date     Atrial fibrillation (H)      Cerebral infarction (H)      Gastroesophageal reflux disease      Gastrointestinal hemorrhage      Hyperlipemia      Major depressive disorder      Mild cognitive impairment, so stated      Neurologic neglect syndrome      Neuromuscular dysfunction of bladder, unspecified      Nontraumatic intracranial hemorrhage (H)      Occlusion and stenosis of bilateral carotid arteries      Radial fracture     right     Repeated falls      Right wrist fracture 10/2018     Past Surgical History   I have reviewed this patient's surgical history and updated it with pertinent information if needed.  Past Surgical History:   Procedure Laterality Date     FOREARM SURGERY  10/2018    fracture repair     Social History   I have reviewed this patient's social history and updated it with pertinent information if needed.   Spoke with Patient's Son Gildardo, patient has been estranged from her family for the past 15 years until this fall when she was hospitalized and found to have a stroke. Per her son, she has had a history of anger issues and outburst and they wonder if she has some sort of underlying mental illness. She now has issues with memory and continues to have angry outburst. It  has been recommended by the nurse practitioner at [place] that Gildardo applies for guardianship, which is has been planning to do but is somewhat hesitant to complete due to his strained relationship with his mother.  Social History     Tobacco Use     Smoking status: Unknown If Ever Smoked     Smokeless tobacco: Never Used   Substance Use Topics     Alcohol use: No     Frequency: Never     Drug use: No     Family History   I have reviewed this patient's family history and updated it with pertinent information if needed.   Family History   Family history unknown: Yes     Prior to Admission Medications   Prior to Admission Medications   Prescriptions Last Dose Informant Patient Reported? Taking?   ASPIRIN PO 3/5/2019 at 1600 Nursing Home Yes Yes   Sig: Take 81 mg by mouth daily   MULTIPLE VITAMINS-MINERALS PO 3/5/2019 at 0800 Nursing Home Yes Yes   Sig: Take 1 tablet by mouth daily   acetaminophen (TYLENOL) 325 MG tablet 3/5/2019 at 2100 [place] [facility] Yes Yes   Sig: Take 650 mg by mouth 3 times daily And every 8 hours PRN    atorvastatin (LIPITOR) 40 MG tablet 3/5/2019 at 2000 Nursing Home Yes Yes   Sig: Take 20 mg by mouth daily    cloNIDine (CATAPRES) 0.1 MG tablet 3/6/2019 at 0217 Nursing Home Yes Yes   Sig: Take 0.1 mg by mouth 2 times daily And as needed for blood pressure greater than 100mmHg   diltiazem ER (TIAZAC) 360 MG 24 hr ER beaded capsule 3/5/2019 at 0800 Nursing Home Yes Yes   Sig: Take 360 mg by mouth daily   gabapentin (NEURONTIN) 100 MG capsule 3/5/2019 at 2000 Nursing Home Yes Yes   Sig: Take 100 mg by mouth At Bedtime   omeprazole 20 MG tablet 3/5/2019 at 0730 Nursing Home Yes Yes   Sig: Take 20 mg by mouth daily   polyethylene glycol (MIRALAX/GLYCOLAX) packet 3/5/2019 at 0800 Nursing Home Yes Yes   Sig: Take 17 g by mouth daily   senna (SENOKOT) 8.6 MG tablet 3/5/2019 at 1600 Nursing Home Yes Yes   Sig: Take 1 tablet by mouth 2 times daily   sertraline (ZOLOFT) 25 MG tablet 3/5/2019 at 0800  Nursing Home Yes Yes   Sig: Take 25 mg by mouth daily      Facility-Administered Medications: None     Allergies   No Known Allergies    Physical Exam   Vital Signs: Temp: 97.1  F (36.2  C) Temp src: Oral BP: 114/80 Pulse: 82 Heart Rate: 87 Resp: 29 SpO2: 96 % O2 Device: None (Room air)    Weight: 126 lbs 1.65 oz    Constitutional: lying down in bed, awake, alert, somewhat cooperative though grows frustrated and angry and withdraws participation, no apparent distress, and appears stated age  Eyes: Lids and lashes normal, pupils equal, round and reactive to light, extra ocular muscles intact, sclera clear, conjunctiva normal  ENT: Normocephalic, without obvious abnormality, atraumatic.  Hematologic / Lymphatic: no cervical lymphadenopathy and no supraclavicular lymphadenopathy  Respiratory: No increased work of breathing, good air exchange, clear to auscultation bilaterally, no crackles or wheezing  Cardiovascular: fast rate with activity of exam, irregular rhythm. no murmur noted.   GI:  normal bowel sounds, soft, non-distended, non-tender   Genitounirinary: deferred  Skin: normal skin color, texture, turgor, no rashes and no lesions  Musculoskeletal: Moves all 4 extremities appropriately. Normal bulk/tone.  Neurological: unwilling to fully participate in exam or answer all questions. Unwilling to fully assess orientation due to participation. Moves all 4 extremities in bed. Cranial nerves appear grossly intact.  Neuropsychiatric: grows angry and agitated with more questions and conversation, appropriate eye contact    Data   Data reviewed today: I reviewed all medications, new labs and imaging results over the last 24 hours. I personally reviewed the EKG tracing showing atrial fibrillation with rapid response and non-specific ST changes no comparison. Chest xray pending.     Recent Labs   Lab 03/06/19  0552 03/06/19  0322   WBC  --  11.8*   HGB  --  14.5   MCV  --  100   PLT  --  245   NA  --  138   POTASSIUM  --   4.4   CHLORIDE  --  104   CO2  --  29   BUN  --  22   CR  --  0.84   ANIONGAP  --  5   RODOLFO  --  9.2   GLC  --  112*   ALBUMIN  --  3.5   PROTTOTAL  --  7.6   BILITOTAL  --  0.4   ALKPHOS  --  109   ALT  --  47   AST  --  43   TROPI 0.028 0.028     No results found for this or any previous visit (from the past 24 hour(s)).

## 2019-03-06 NOTE — ED PROVIDER NOTES
0655: Patient signed out to me at shift change with DESTINY mccarty treated with a bolus of diltiazem 10 mg during the night with improvement in heart rate.  Now heart rate is increased again to 130s-140s.  She had had chest pressure at onset of this which is not now present.  EKGs show ST segment elevation in leads V1 and V2 and aVL and T wave inversion in the lateral precordial leads as well as voltage criteria for LVH.  Previous EKGs are not available.  Previous interpretation of EKGs indicated LVH with T wave inversion in the lateral leads.  Likely current EKG findings are chronic, but this is uncertain.    Given this uncertainty, the EKG was repeated.       EKG Interpretation: 2     Interpreted by George Cuevas  Time reviewed:06:27  Symptoms at time of EKG: none   Rhythm: Atrial fibrillation  Rate: 139  Axis: NORMAL  Ectopy: none  Conduction: Voltage criteria for LVH, poor R wave progression  ST Segments/ T Waves: Lateral ST segment depression, downsloping, and T wave inversion leads I and aVL  Q Waves: Lead III  Comparison to prior: Unchanged from the EKG earlier today    Clinical Impression: Sinus rhythm with poor R wave progression and voltage criteria for LVH unchanged from previous EKG    The above EKG is reassuring.  It is similar to the first 1 obtained and 2 with described in the outside records though the actual tracing is not available for my review.    With inadequate heart rate control, blood pressure measured manually is 115 systolic.  Automated blood pressure readings are widely discordant so manual reading was obtained.  Manual readings are acceptable for re-bolused with Cardizem.  Since it has been many hours since her last bolus we will again give her a 10 mg bolus and initiated infusion at 5 mg/h and initiated admission to ICU for further management.    0800 hrs.: Heart rate has decreased to  and blood pressure is 96 systolic.  She is asymptomatic.  Patient case reviewed with Dr. Pickens of the  hospital service.  They will assume care on admission.     George Cuevas MD  03/06/19 0815       George Cuevas MD  03/06/19 0926       George Cuevas MD  03/06/19 0927

## 2019-03-06 NOTE — ED NOTES
Pt clark John notified of patient plan to be hospitalized rather than discharged due to variability of HR and need for control.

## 2019-03-06 NOTE — PROGRESS NOTES
[place] [place]    Patient admitted to room 1004 at approximately 0925 via cart from emergency room. Patient was accompanied by nurse.     Verbal SBAR report received from RN prior to patient arrival.     Patient ambulated to bed with stand-by assist. Patient alert and oriented X 1. The patient is not having any pain. 0-10 Pain Scale: 0. Admission vital signs: Blood pressure 117/65, pulse 68, temperature 97.1  F (36.2  C), temperature source Oral, resp. rate 19, height 1.702 m (5' 7\"), weight 57.2 kg (126 lb 1.7 oz), SpO2 92 %, not currently breastfeeding. Patient was oriented to plan of care, call light, bed controls, tv, telephone, bathroom and visiting hours.     Risk Assessment    The following safety risks were identified during admission: fall. Yellow risk band applied: YES.     Skin Initial Assessment    This writer admitted this patient and completed a full skin assessment and Hood score in the Adult PCS flowsheet. Appropriate interventions initiated as needed.     Secondary skin check completed by Marti BASS.    Hood Risk Assessment  Sensory Perception: 3-->slightly limited  Moisture: 3-->occasionally moist  Activity: 3-->walks occasionally  Mobility: 4-->no limitation  Nutrition: 3-->adequate  Friction and Shear: 3-->no apparent problem  Hood Score: 19  Bed Support Surface: Atmos Air mattress  Hood Intervention(s) Implemented: draw sheets, dressing to sacrum, heels suspended, HOB elevated 30 degrees or less, patient /family education on pressure injury prevention, patient education on pressure relief reinforced, patient to shift weight in chair every 15 mins, pillows between bony prominences, repositioned/turned q2hr, specialty chair cushion    Rossy Deleon    "

## 2019-03-06 NOTE — PROGRESS NOTES
"Hr continues to have a variable rate, pt does complain of shortness of air when HR elevated (fan on for comfort) and states she can feel  \"fluttering\". Ambulated to bathroom with standby, did not use light, missed hat for urine  "

## 2019-03-06 NOTE — PROGRESS NOTES
Cardizem and metoprolol oraly given, has remained in a-fib 70 to low 100's, Cardizem drip stopped at 1645, continue to monitor closely.

## 2019-03-06 NOTE — ED NOTES
Patient up to bedside commode with assist of 1. Patient tolerated well. Returned to bed, monitor applied.

## 2019-03-06 NOTE — PROGRESS NOTES
Pt alert, confused. Very excitable. Flight of ideas, rambles.  In a-fib, rate will range from 70 to 90 while at rest, with any movement HR will increase to 120-140, diltiazem on at 5 mg/hr. Blood pressure within normal limits. sats stable on room air. currently denies any pain.   Has a pea size coccyx pressure injury, area cleaned and mepelix applied. Pt states she sits in bed at nursing home for hours watching TV, educated pt on pressure injury prevention, agrees to turn on side after lunch.

## 2019-03-06 NOTE — ED TRIAGE NOTES
"Pt noted by staff at [facility] to have elevated BP and HR around 0200. On call MD notified and clonidine 0.1mg given and nitroglycerine q 5 minutes x3. EMS was then contacted for transport to ED. Pt arrives alert and in no acute distress. 's, irregular, denies pain. Pt does report if she flattens out her legs she cannot breathe well. Pt is irritable with interactions, discussing how her house and all her belongings were taken from her, that she did not choose to live at [place], that her family betrayed her and left her and that \"everyone is against me\".    "

## 2019-03-06 NOTE — ED NOTES
Spoke with Annie at [facility] to update on patient status and that she will be returning to the facility after one last troponin draw.

## 2019-03-07 NOTE — DISCHARGE SUMMARY
[facility]    Discharge Summary  Hospital Medicine    Date of Admission:  3/6/2019  Date of Discharge:  3/7/2019   Discharging Provider: Bill Fields  Date of Service: 3/7/2019      Primary Care     Natasha Diaz  [address] [place] [id number]      Identification and Chief Compaint:  Citlalli iVllarreal is a 73 year old female admitted on 3/6/2019. She has history of ischemic CVA with hemorrhagic conversion, carotid artery stenosis, possible GI bleeding, persistent atrial fibrillation, dementia. She presents with chest pain.       Discharge Diagnoses       Atrial fibrillation with RVR (H)    Carotid stenosis, asymptomatic, bilateral    HLD (hyperlipidemia)    Intracranial atherosclerosis    Intracranial bleed (H)    Current moderate episode of major depressive disorder, unspecified whether recurrent (H)    Dementia without behavioral disturbance, unspecified dementia type      Discharge Disposition   Discharged to long-term care facility    Discharge Orders   No discharge procedures on file.     Discharge Medications   Current Discharge Medication List      START taking these medications    Details   !! aspirin (ASA) 81 MG tablet Take 2 tablets (162 mg) by mouth daily  Refills: OTC       !! - Potential duplicate medications found. Please discuss with provider.      CONTINUE these medications which have NOT CHANGED    Details   acetaminophen (TYLENOL) 325 MG tablet Take 650 mg by mouth 3 times daily And every 8 hours PRN       !! ASPIRIN PO Take 81 mg by mouth daily      atorvastatin (LIPITOR) 40 MG tablet Take 20 mg by mouth daily       cloNIDine (CATAPRES) 0.1 MG tablet Take 0.1 mg by mouth 2 times daily And as needed for blood pressure greater than 100mmHg      diltiazem ER (TIAZAC) 360 MG 24 hr ER beaded capsule Take 360 mg by mouth daily      gabapentin (NEURONTIN) 100 MG capsule Take 100 mg by mouth At Bedtime      MULTIPLE VITAMINS-MINERALS PO Take 1 tablet by mouth daily  "     omeprazole 20 MG tablet Take 20 mg by mouth daily      polyethylene glycol (MIRALAX/GLYCOLAX) packet Take 17 g by mouth daily      senna (SENOKOT) 8.6 MG tablet Take 1 tablet by mouth 2 times daily      sertraline (ZOLOFT) 25 MG tablet Take 25 mg by mouth daily       !! - Potential duplicate medications found. Please discuss with provider.        Allergies   No Known Allergies    Consultations This Hospital Stay   Consultation during this admission received from:    CARE TRANSITION RN/SW IP CONSULT    Significant Results and Procedures   Procedures    None.    Data   Results for orders placed or performed during the hospital encounter of 03/06/19   XR Chest Port 1 View    Narrative    XR CHEST PORT 1 VW 3/6/2019 3:43 PM    HISTORY: Leukocytosis.    COMPARISON: None.      Impression    IMPRESSION: A single view of the chest show shows modest cardiomegaly.  Pulmonary vessels are not significantly congested and there are no  pleural effusions. Coarse interstitial opacities are seen throughout  the lungs. They may relate to scarring as there are no other findings  to support interstitial pulmonary edema/CHF. No focal consolidation is  seen.     MEGAN BOBBY MD       History of Present Illness   (From H&P) Citlalli Villarreal is a 73 year old female who presents from [facility] with chest pressure.     History is difficult to obtain from patient due to baseline memory issues/cognitive decline. She is cooperative though easily grows upset and angry with questioning and exam. She states she did have some chest pain yesterday, unwilling to describe further. She states it has resolved now. She does not have any complaints currently \"I'm just here and I listen to what they tell me to do\". She does not have good insight to her medical history or recent hospitalization 10/2018 where she was found to have an ischemic stroke that had hemorrhagic conversion as well as previously undiagnosed atrial fibrillation with " RVR.      On presentation to the ED she did report chest pain that was present on arrival, initially intermittent though grew constant.She was reportedly hypertensive in the nursing home and given clonidine as well as nitroglycerin x 3.      She specifically denies any fever, chills, recent URI symptoms, chest pain/pressure, shortness of breath, palpitations, abdominal pain, nausea, emesis, diarrhea, dysuria, changes in urinary frequency or urgency.         Hospital Course   Citlalli Villarreal was admitted on 3/6/2019.  The following problems were addressed during her hospitalization:     Atrial Fibrillation With RVR, history of persistent atrial fibrillation  Chest pressure/tightness reported, found to be in atrial fibrillation. Admission ECG consistent with atrial fibrillation with rapid response, with nonspecific ST changes, no prior comparison available. History of persistent atrial fibrillation first noted on admission 10/2018 at which time she was also found to have acute CVA. Initially treated with amiodarone, digoxin and diltiazem. Amiodarone was discontinued as she had not converted, digoxin was discontinued as there was LVH with concern for amyloidosis. At that time, 12/2018, her diltiazem was increased to 360 mg daily and a Zio Patch at that time showed good control with average HR of 82.  Had been advised to start anticoagulation once cleared by neurology (CHADVASc 4) though family elected not to follow up with neurology regarding this.  Etiology for suboptimal rate control on admission not totally clear.  EKG's were not suggestive of ischemia, and serial troponin remained negative.  TSH, electrolytes were normal.     Upon admission, she was initially treated with a diltiazem drip, then home diltiazem  mg Q24H was resumed.  She received two small doses of metoprolol this hospital stay: metoprolol tartrate 12.5 mg.  Following the second metoprolol dose, overnight last night, and this morning, HR has  been consistently, mildly bradycardic, HR 52 on this AM EKG.  Discussed with patient risks/benefits to continuing low dose metoprolol at discharge, though I advised against it, as I think her risk for symptomatic bradycardia on combination diltiazem and metoprolol would be significant.  She expressed understanding and agreement.  - Discharge back to [facility] today.  - Continue diltiazem  mg every day.  - No additional rate control Rx added.  - Defer to outpatient for further discussion of anticoagulation risk/benefit.     Chest pain  Suspect this pain was due to atrial fibrillation with RVR given resolution with better rate control on admission. Troponin 0.028 on presentation. Repeat 0.028 at 6 AM,and 0.021 later that day. ECG showed atrial fibrillation with nonspecific ST changes. Patient did report some chest pain/pressure when rate was more rapid, none reported after rate control improved  - Resolved with improved rate control.     Elevated BNP  BNP 2976. Clinically did not appear to be volume up, CXR on admission not suggestive of CHF.  Last echo (Care Everywhere) showed LVH, normal EF.  No diuresis was felt necessary.     History of ischemic stroke with hemorrhagic conversion 10/2018  Atherosclerotic cerebrovascular disease  Carotid artery stenosis  Per chart review, history of ischemic stroke with hemorrhagic conversion 10/2018. Started on aspirin with plan to start Plavix or Eliquis if event monitor showed atrial fibrillation instead of dual antiplatelet when cleared by neurology based on follow up head CT scan. CTA 10/2018, showed L ICA 70-80% stenosis, R ICA 60-70% stenosis and high grade atherosclerotic narrowing of vertebral and basilar arteries. Follow up CT head 12/2018 showed temporal evolution of infracts in left occipital lobe, resolved hematoma, no new intracranial hemorrage. Patient has not had follow up with neurology and per chart review family has declined further follow up in the  past.   - Continue home aspirin and atorvastatin   - Defer to outpatient for further management of antiplatelet vs anticoagulant therapy.     Hypertension  Chronic. Blood pressure control here generally good on diltiazem alone.  Preadmission clonidine was held this hospital stay, does not appear to be necessary upon discharge.  - Continue diltiazem.  - Discontinue clonidine at discharge.     History of possible GI bleed  During hospitalization 10/2018 for stroke noted to have stool was positive for occult blood. Plan was for outpatient follow up with repeat fecal occult blood testing or colonoscopy.  - Continue home PPI.     Cognitive Impairment, Dementia  SLUMs 11/30, OCA 11/30 per chart review.  Per son, Gildardo, patient has chronic issues with memory impairment and behavioral outburst.  He is currently applying for guardianship, though their relationship is strained.     LVH  Noted on echocardiogram, differential per cardiology includes due to hypertensive disease vs amyloid.  - Continue outpatient evaluation.             Pending Results   Unresulted Labs Ordered in the Past 30 Days of this Admission     Date and Time Order Name Status Description    3/6/2019 1508 Urine Culture Aerobic Bacterial Preliminary           Physical Exam   Temp:  [96.5  F (35.8  C)-97.9  F (36.6  C)] 97.9  F (36.6  C)  Pulse:  [] 47  Heart Rate:  [] 52  Resp:  [0-29] 16  BP: ()/() 115/72  SpO2:  [91 %-98 %] 96 %  Vitals:    03/06/19 0325 03/06/19 1000   Weight: 55.3 kg (122 lb) 57.2 kg (126 lb 1.7 oz)     GENERAL: Pleasant woman, lying in bed, looks comfortable.  EYES: Eyes grossly normal to inspection, extraocular movements intact  HENT: Nares patent bilaterally.  Nasal mucosa normal, no discharge.  RESP: No accessory muscle use.  Symmetrical breath sounds.  Lungs clear anteriorly on inspiration and expiration.  Expiration not prolonged, no wheeze.  CV: Irregularly irregular, bradycardic, current rate 54.  Normal  S1 S2, no murmur heard, no extra sound.  No lower extremity edema.  ABDOMEN: Soft, non-tender, no guarding.  Bowel sounds positive.  NEURO: Alert, oriented, conversant.  Cranial nerves III - XII grossly intact.  No gross motor or sensory deficits.          The discharge plan was discussed with the patient, who expressed agreement.    Total time on this discharge was 40 minutes.    Bill Fields MD

## 2019-03-07 NOTE — PROGRESS NOTES
Patient undressed herself, removed her IV, and all of her heart monitors and put her regular clothing on.  States she feels warmer in her clothing.  Discussed with provider and ok to leave out IV, and he is aware she will not leave a pulse oximeter or tele monitor on.  Will spot check and record heart rate every few hours to monitor a trend.

## 2019-03-07 NOTE — PROGRESS NOTES
JUANJO SOLOMON DISCHARGE NOTE    Patient discharged to [facility] at 10:49 AM via wheel chair. Accompanied by other:transport  staff. Discharge instructions reviewed with Eriberto BASS at [facility] opportunity offered to ask questions. Prescriptions - None ordered for discharge. All belongings sent with patient.    Marii San

## 2019-03-07 NOTE — PROGRESS NOTES
"Ambulated pt to bathroom, up in chair, became anxious wanting to go back to bed, \"I feel un secure\", using call light frequently for multiple requests. Blood pressure increased, metoprolol 25 mg given per order, continue to monitor.   "

## 2019-03-07 NOTE — CONSULTS
Care Transition Initial Assessment/ Discharge note      Met with: Family, Son Gildardo via telephone.    DATA   Principal Problem:    Atrial fibrillation with RVR (H)  Active Problems:    Carotid stenosis, asymptomatic, bilateral    HLD (hyperlipidemia)    Intracranial atherosclerosis    Intracranial bleed (H)    Current moderate episode of major depressive disorder, unspecified whether recurrent (H)    Dementia without behavioral disturbance, unspecified dementia type    Contact information and PCP information verified: Yes    ASSESSMENT  Cognitive Status: awake.  Insurance Concerns: No Insurance issues identified    This writer met with pt's son Gildardo, introduced self and role. Care Transitions is consulted for discharge planning. This writer discussed discharge planning and Medicare guidelines in regards LTC. Pt lives at [facility] Phone (Main Phone:[phone] Admissions Phone:[phone] Fax: [phone]) in the LTC. Pt has family support.  Patient's son is in agreement and has a goal of patient returning to her LTC facility upon discharge.  This writer discussed with the patient the private pay costs for transportation, patient aware. Transportation provided by [facility] at 1100 today.    Patient has  [place] to Observation  order. Patient's son has been given Patient Bill of Rights, Observation brochure and  What does Observation mean to me  forms.  Patient's son has been given the opportunity to ask questions about observation status and their plan of care.   PLAN  [facility] Phone (Main Phone:[phone] Admissions Phone:[phone] Fax: [phone]) [facility]        Sanam Snyder RN Care Coordinator  [facility] [phone]  [facility] [phone]

## 2019-03-07 NOTE — PLAN OF CARE
Pt became agitated with EKG this am. Pt yelling/swearing but has been cooperative but not aggressive. It may be best to have 2 people present when doing cares or procedures because of pt's paranoid tendency.

## 2019-03-07 NOTE — PLAN OF CARE
HR has been 40-60 tonight with good BP. Pt has no complaints, steady when ambulating in her room. She has been able to sleep in 1-2 hour increments.

## 2019-03-07 NOTE — PHARMACY - DISCHARGE MEDICATION RECONCILIATION
Discharge medication review for this patient is complete. Pharmacist assisted with medication reconciliation of discharge medications with prior to admission medications.     The following changes were made to the discharge medication list based on pharmacist review:  Added:  none  Discontinued: none  Changed: none      Patient's Discharge Medication List  - medications as listed on After Visit Summary (AVS)     Review of your medicines      CONTINUE these medicines which may have CHANGED, or have new prescriptions. If we are uncertain of the size of tablets/capsules you have at home, strength may be listed as something that might have changed.      Dose / Directions   aspirin 81 MG tablet  Commonly known as:  ASA  This may have changed:      medication strength    how much to take      Dose:  162 mg  Take 2 tablets (162 mg) by mouth daily  Refills:  OTC        CONTINUE these medicines which have NOT CHANGED      Dose / Directions   acetaminophen 325 MG tablet  Commonly known as:  TYLENOL      Dose:  650 mg  Take 650 mg by mouth 3 times daily And every 8 hours PRN  Refills:  0     atorvastatin 40 MG tablet  Commonly known as:  LIPITOR      Dose:  20 mg  Take 20 mg by mouth daily  Refills:  0     diltiazem  MG 24 hr ER beaded capsule  Commonly known as:  TIAZAC      Dose:  360 mg  Take 360 mg by mouth daily  Refills:  0     gabapentin 100 MG capsule  Commonly known as:  NEURONTIN      Dose:  100 mg  Take 100 mg by mouth At Bedtime  Refills:  0     MULTIPLE VITAMINS-MINERALS PO      Dose:  1 tablet  Take 1 tablet by mouth daily  Refills:  0     omeprazole 20 MG tablet      Dose:  20 mg  Take 20 mg by mouth daily  Refills:  0     polyethylene glycol packet  Commonly known as:  MIRALAX/GLYCOLAX      Dose:  17 g  Take 17 g by mouth daily  Refills:  0     senna 8.6 MG tablet  Commonly known as:  SENOKOT      Dose:  1 tablet  Take 1 tablet by mouth 2 times daily  Refills:  0     sertraline 25 MG tablet  Commonly  known as:  ZOLOFT      Dose:  25 mg  Take 25 mg by mouth daily  Refills:  0        STOP taking    cloNIDine 0.1 MG tablet  Commonly known as:  JOAQUIN Cárdenas, ChachoD

## 2019-03-09 PROBLEM — R41.89 COGNITIVE IMPAIRMENT: Status: ACTIVE | Noted: 2018-11-21

## 2019-03-09 PROBLEM — K21.00 GASTROESOPHAGEAL REFLUX DISEASE WITH ESOPHAGITIS: Status: ACTIVE | Noted: 2019-01-01

## 2019-03-09 PROBLEM — K59.09 OTHER CONSTIPATION: Status: ACTIVE | Noted: 2019-01-01

## 2019-03-09 PROBLEM — I48.91 A-FIB (H): Status: ACTIVE | Noted: 2019-01-01

## 2019-03-09 PROBLEM — R29.6 RECURRENT FALLS: Status: ACTIVE | Noted: 2018-10-15

## 2019-03-09 NOTE — TELEPHONE ENCOUNTER
Patient with elevated BP and HR this AM. HR now normal and BP improved. At noon patient fell - at the time of the fall SBP 150s and HR 120s. Has PRN clonidine order. No dyspnea, no chest pain.     PLAN  OK to use PRN clonidine daily for HTN - may give dose now, monitor. Update with any change in status.     Electronically signed by JADA Metcalf GNP     ADDENDUM  At 1515 notified patient had another fall at 1 pm.  . She has garbled speech,right pupil is fixed, unable to correctly identify care giver.   DNR/DNI but OK for hospitalization.     In view of above changes - asked staff to update family, send patient to ED for eval.     Electronically signed by JADA Metcalf GNP

## 2019-03-09 NOTE — LETTER
March 12, 2019      Citlalli Villarreal  [facility]  [address] [place] [id number]        To Whom It May Concern:    Citlalli Villarreal was admitted to our hospital.  Her daughter Susan should remain out of work until 3/18/19 to help care for her ill mother.      Sincerely,        Dr. Melissa Glass,   [facility] Internal Medicine

## 2019-03-10 PROBLEM — Z86.73 HISTORY OF EMBOLIC STROKE: Status: ACTIVE | Noted: 2019-01-01

## 2019-03-10 PROBLEM — R47.01 EXPRESSIVE APHASIA: Status: ACTIVE | Noted: 2019-01-01

## 2019-03-10 PROBLEM — I63.40 CEREBROVASCULAR ACCIDENT (CVA) DUE TO EMBOLISM OF CEREBRAL ARTERY (H): Status: ACTIVE | Noted: 2019-01-01

## 2019-03-10 PROBLEM — R29.818 NEUROLOGICAL DEFICIT PRESENT: Status: RESOLVED | Noted: 2019-01-01 | Resolved: 2019-01-01

## 2019-03-10 PROBLEM — R29.818 NEUROLOGICAL DEFICIT PRESENT: Status: ACTIVE | Noted: 2019-01-01

## 2019-03-10 NOTE — PROGRESS NOTES
"[facility] Medicine Progress Note  Date of Service: 03/10/2019    Assessment & Plan   Citlalli Villarreal is a 73 year old female who presented on 3/9/2019 with confusion and speech change.        Principal Problem:    Expressive aphasia - Patient noted by staff at [facility] to have change in her baseline cognitive function, including speech changes (\"garbled speech\"), troubles walking and trouble following directions which were new as of the day of admission.  CT head 3/9/19 showed chronic ischemic changes, but no acute, change, and importantly, no evidence of hemorrhage (see below).  Neuro exam this morning is notable only for an expressive aphasia.  She word searches, often comes up with a word not pertinent, and repeats a single word once she says it.  She is capable of forming full, coherent sentences however.  Her speech is clear, not slurred; I find no dysarthria.  She has no motor weakness anywhere else, no facial asymmetry.  She appears to have sustained an Embolic stroke due to atrial fibrillation (H), not on anticoagulation; see below.  There is no contraindication found to starting anticoagulation at this point.  Will start a heparin gtts until such time as we can talk to her son to discuss risk/benefit of long-term anticoagulation.  - Heparin gtts, pharmacy to dose.  - Observe Neuro status.  - Bedside swallow to see if she may have associated dysphagia.  If none, resume PO meds and diet.       History of embolic stroke with hemorrhagic conversion 10/15/2018 - admitted to [facility] with evidence of stroke, believed to be embolic, but then with hemorrhagic conversion.  Due to hemorrhage, anticoagulation was not started, and Neurosurgery follow-up was advised to follow-up on the hemorrhage and to get advice on when anticoagulation could be restarted.  Was seen in Neurosurgery follow-up 10/31/18 by Kim Melissa CNP.  CT head done that visit, showed \"near complete resolution\" " "of hemorrhage.  Note suggests that clopidogrel be added at that time, and event monitor recommended.  Per note, if a-fib seen on event monitor, \"I would recommend Eliquis\".  Patient obviously has persistent a-fib, and now has clinical evidence of another embolic stroke.  There is no evidence of hemorrhage as above.  Therefore, I find no contraindication to starting systemic anticoagulation.  - Heparin gtts started; can convert to NOAC if son gives consent for long-term anticoagulation.    Active Problems:    Atrial fibrillation with RVR (H) - outpatient rate control achieved with diltiazem  mg every day.  Previous Rx attempted with digoxin and amiodarone, but neither was continued, perhaps because they were ineffective.  She was hospitalized here within the last week for atrial fibrillation and rapid ventricular response.  Metoprolol was added to the patient's usual diltiazem, though metoprolol had to be discontinued due to bradycardia, rates sustained in the 50s and sometimes in the 40s, and as result she was discharged only on diltiazem  mg p.o. daily.  On arrival here, IV diltiazem was attempted, but resulted in hypotension.  As result, the patient is currently on an amiodarone continuous infusion.  - Bedside swallow evaluation.  If able to take PO meds, will resume diltiazem, but will use short-acting to start to make it easier to titrate.  Will start at diltiazem 60 mg Q6H, which is lower than her preadmission dose of 360 mg daily.  - May consider retrying a beta blocker if additional rate control needed.  If metoprolol were added to a lower diltiazem dose, perhaps less bradycardia would be seen.  - Starting systemic anticoagulation as above.      Elevated WBC: WBC on admission 13.7.  Afebrile, lungs sound clear, urinalysis looks normal and no other identifiable source of infection noted.  WBC this morning 8.6.  -Resolved.       HLD (hyperlipidemia): On atorvastatin as outpatient.  - Continue " atorvastatin.       Cognitive Impairment, Dementia: SLUMs 11/30, OCA 11/30 per chart review.  Per son, Gildardo, patient has chronic issues with memory impairment and behavioral outburst.  He is currently applying for guardianship, though their relationship is strained.      Current moderate episode of major depressive disorder, unspecified whether recurrent (H): No information regarding recent mood.  - Continue preadmission sertraline.       Other constipation: Continue PTA MiraLAX and Senokot       Gastroesophageal reflux disease with esophagitis: Continue PTA omeprazole     Prophylaxis -Heparin gtts being started.  Code Status: Full Code.    Lines: Peripheral.   Silver catheter: None.  Restraints: None.    Disposition - Continues to require ICU care due to amiodarone gtts.  Anticipate another 2 to 3 days of hospitalization.     Attestation:   I have reviewed today's vital signs, notes, medications, labs and imaging.   Amount of time spent on this critical care note: 45 minutes.     Bill Fields MD       Interval History   Patient finds it difficult to form sentences to explain symptoms, so we went more with yes/no history.  Denies numbness anywhere, no weakness.  Able to swallow water without choke or cough.  No chest pain, no headache.    Physical Exam   Temp:  [97.5  F (36.4  C)-97.9  F (36.6  C)] 97.5  F (36.4  C)  Pulse:  [] 112  Heart Rate:  [] 108  Resp:  [0-54] 18  BP: ()/() 109/91  SpO2:  [82 %-99 %] 93 %    Weights:   Vitals:    03/09/19 1607 03/09/19 2054 03/10/19 0400   Weight: 53.5 kg (118 lb) 55.6 kg (122 lb 9.2 oz) 56.5 kg (124 lb 9 oz)    Body mass index is 22.78 kg/m .    GENERAL: Pleasant, shakes my hand, in no distress.  EYES: Eyes grossly normal to inspection, extraocular movements intact  HENT: Nares patent bilaterally.  Nasal mucosa normal, no discharge.   NECK: Trachea midline, no stridor.    RESP: No accessory muscle use.  Symmetrical breath sounds.  Lungs clear  throughout on inspiration and expiration.  Expiration not prolonged, no wheeze.  CV: Irregularly irregular, non-tachycardic.  Normal S1 S2, soft holosystolic murmur heard along left sternal border, no extra sound.  No lower extremity edema.  ABDOMEN: Soft, non-tender, no guarding.  Liver and spleen not enlarged, no masses palpable.  Bowel sounds positive.  MS: No bony deformities noted.  No red or inflamed joints.  SKIN: Warm and dry, no rashes where skin visible.  NEURO: Alert, conversant, offers appropriate responses to my questions, though she word searches, often comes up with a word not pertinent, and repeats a single word once she says it.  She is capable of forming full, coherent sentences however.  Her speech is clear, not slurred; I find no dysarthria.  She has no motor weakness anywhere else, no facial asymmetry.   No gross motor or sensory deficits.  PSYCH: Calm, alert, conversant.  Able to articulate logical thoughts, no tangential thoughts, no hallucinations or delusions.  Affect normal.        Data   Recent Labs   Lab 03/10/19  0526 03/09/19  1615 03/06/19  1527 03/06/19  0552 03/06/19  0322   WBC 8.6 13.7*  --   --  11.8*   HGB 13.6 14.9  --   --  14.5   MCV 99 98  --   --  100    248  --   --  245    137  --   --  138   POTASSIUM 4.1 4.7  --   --  4.4   CHLORIDE 108 106  --   --  104   CO2 25 23  --   --  29   BUN 15 19  --   --  22   CR 0.77 0.80  --   --  0.84   ANIONGAP 7 8  --   --  5   RODOLFO 8.6 9.7  --   --  9.2   * 130*  --   --  112*   ALBUMIN  --  3.8  --   --  3.5   PROTTOTAL  --  8.1  --   --  7.6   BILITOTAL  --  0.6  --   --  0.4   ALKPHOS  --  115  --   --  109   ALT  --  33  --   --  47   AST  --  20  --   --  43   TROPI  --   --  0.021 0.028 0.028       Recent Labs   Lab 03/10/19  0526 03/09/19  1615 03/06/19  0322   * 130* 112*        Unresulted Labs Ordered in the Past 30 Days of this Admission     Date and Time Order Name Status Description    3/9/2019 2117  Methicillin Resist/Sens S. aureus PCR In process            Imaging  Recent Results (from the past 24 hour(s))   CT Head w/o Contrast    Narrative    CT SCAN OF THE HEAD WITHOUT CONTRAST   3/9/2019 5:24 PM     HISTORY: Altered level of consciousness (LOC), unexplained.  73-year-old with prior CNS hemorrhage.  Altered mental status.    TECHNIQUE: Noncontrast axial CT images of the head were obtained. .    COMPARISON:  None.     FINDINGS: There is mild cerebral volume loss. There is  encephalomalacia in the left occipital lobe from prior infarction.  There are chronic lacunar infarctions in the left thalamus and right  basal ganglia. Patchy low attenuation of the white matter is  nonspecific, though most likely due to moderate small vessel ischemic  disease. No evidence of hemorrhage, mass or recent infarction. The  cerebellum and brainstem are unremarkable. The globes and orbits are  unremarkable.  The imaged portions of the paranasal sinuses and  mastoid air cells are clear.  The skull base and calvaria are intact.   The external auditory canals are occluded with cerumen bilaterally.  Small amount of gas in the soft tissues of the right infratemporal  fossa is likely within the vasculature from IV fluids.      Impression    IMPRESSION:   1. No acute findings by CT.  2. Moderate-sized chronic infarction in the left occipital lobe.  3. Chronic lacunar infarctions in the left thalamus and in the right  basal ganglia.  4. Moderate small vessel disease.  5. Cerumen occludes the external auditory canals bilaterally.    CED ZAVALA MD   Cervical spine CT w/o contrast    Narrative    CT OF THE CERVICAL SPINE WITHOUT CONTRAST   3/9/2019 5:25 PM     COMPARISON: None    HISTORY: Fall.     TECHNIQUE: Axial images of the cervical spine were acquired without  intravenous contrast. Multiplanar reformations were created.      FINDINGS: There is no fracture or acute traumatic malalignment of the  cervical spine. There is 3 mm of  anterolisthesis of C3 on C4 and 2 mm  of retrolisthesis of C5 on C6 and 2 mm of anterolisthesis of C7 on T1  due to degenerative changes. There is severe loss of disc height at  C5-6 and mild loss disc height at C2-3 and C3-4. The odontoid is  intact. The cervical medullary junction is patent. The paraspinous  soft tissues are unremarkable.    C2-3: The facets on the right side are fused. The spinal canal and  neural foramina are patent.    C3-4: Shallow disc bulge and osteophytes. Severe facet arthropathy on  the left side. Spinal canal is patent. Right neural foramen is patent.  The left neural foramen is moderately narrowed.    C4-5: Shallow disc bulge and osteophytes. The spinal canal and neural  foramina are patent. There is mild facet arthropathy on the left side.    C5-6: Bulging disc and osteophytes. Spinal canal is mildly narrow. The  neural foramina are moderately to severely narrowed on the right and  moderately narrowed on the left.    C6-7: Spinal canal and neural foramen are patent.    C7-T1: Moderate facet arthropathy on the left side. The left neural  foramen is moderately narrowed. The spinal canal and right neural  foramen are patent.      Impression    IMPRESSION:  1. No fracture or traumatic malalignment.  2. Multilevel degenerative disc disease which includes mild central  spinal stenosis at C5-6.  3. Neural foraminal stenosis and facet degenerative changes at  multiple levels.    Radiation dose for this scan was reduced using automated exposure  control, adjustment of the mA and/or kV according to patient size, or  iterative reconstruction technique    CED ZAVALA MD        I reviewed all new labs and imaging results over the last 24 hours. I personally reviewed no images or EKG's today.    Medications     amiodarone 0.5 mg/min (03/10/19 0557)     diltiazem (CARDIZEM) infusion ADULT Stopped (03/09/19 2100)     sodium chloride 75 mL/hr at 03/09/19 5236       acetaminophen  650 mg Oral TID      aspirin  162 mg Oral Daily     atorvastatin  20 mg Oral Daily     diltiazem  60 mg Oral Q6H HAMMAD     gabapentin  100 mg Oral At Bedtime     omeprazole  20 mg Oral QAM AC     polyethylene glycol  17 g Oral Daily     sennosides  1 tablet Oral BID     sertraline  25 mg Oral Daily       Bill Fields MD

## 2019-03-10 NOTE — H&P
ADMISSION HISTORY AND PHYSICAL  Patient Name: Citlalli Villarreal  Address: [address] [address]  Age:73 year old  Sex: female  Admission Date/Time: 3/9/2019  4:01 PM  Admitting provider: Jovana Deluna MD  Hospital Attending Physician: Jovana Deluna*   Primary Care Provider: Natasha Diaz      CHIEF COMPLAINT: Confusion, speech change and falls  Patient has baseline Dementia and lives at [facility].  She is reported has been able to carry on a conversation at baseline but history today was obtained from [facility] as his speech is garbled.      HPI:   Citlalli Villarreal has a history of Hyperlipidemia, A Fib on Diltiazem, GERD, Constipation, Anxiety/depression, cognitive impairment, asymptomatic bilateral carotid stenosis, GERD, constipation and depression.  She is reported to have been in her usual state of health until around noon today when she felt suddenly.  She was able to get up but she had a second fall about an hour later and subsequently had troubles ambulating by herself.  Nursing from her living facility also notes that after the fall, she seemed to be weak on the right side and confused and has speech was slurred/garbled.  No facial asymmetry noted.  No seizure activities.    Of note, patient was just in the hospital admitted 3/6/2019 and discharged 3/7/2019 after she had presented with chest pain and was found to be in A. fib with RVR.  At that point, she had received amiodarone, digoxin, Metoprolol and diltiazem and was discharged on an increased dose of diltiazem at the time but not to metoprolol as a combination of these 2 made her bradycardic.    REVIEW OF SYSTEMS  Unable to complete due to dementia and speech problems but nursing reports that she had been baseline prior to her first fall.      PAST MEDICAL HISTORY  Past Medical History:   Diagnosis Date     Atrial fibrillation (H)      Cerebral infarction (H)       Gastroesophageal reflux disease      Gastrointestinal hemorrhage      Hyperlipemia      Major depressive disorder      Mild cognitive impairment, so stated      Neurologic neglect syndrome      Neuromuscular dysfunction of bladder, unspecified      Nontraumatic intracranial hemorrhage (H)      Occlusion and stenosis of bilateral carotid arteries      Radial fracture     right     Repeated falls      Right wrist fracture 10/2018          PAST SURGICAL HISTORY  Past Surgical History:   Procedure Laterality Date     FOREARM SURGERY  10/2018    fracture repair          MEDICATIONS  No current outpatient medications on file.          ALLERGIES  Patient has no known allergies.        FAMILY HISTORY  Family History   Family history unknown: Yes          SOCIAL HISTORY  Social History     Socioeconomic History     Marital status: Single     Spouse name: Not on file     Number of children: Not on file     Years of education: Not on file     Highest education level: Not on file   Occupational History     Not on file   Social Needs     Financial resource strain: Not on file     Food insecurity:     Worry: Not on file     Inability: Not on file     Transportation needs:     Medical: Not on file     Non-medical: Not on file   Tobacco Use     Smoking status: Unknown If Ever Smoked     Smokeless tobacco: Never Used   Substance and Sexual Activity     Alcohol use: No     Frequency: Never     Drug use: No     Sexual activity: Not on file   Lifestyle     Physical activity:     Days per week: Not on file     Minutes per session: Not on file     Stress: Not on file   Relationships     Social connections:     Talks on phone: Not on file     Gets together: Not on file     Attends Religion service: Not on file     Active member of club or organization: Not on file     Attends meetings of clubs or organizations: Not on file     Relationship status: Not on file     Intimate partner violence:     Fear of current or ex partner: Not on file      Emotionally abused: Not on file     Physically abused: Not on file     Forced sexual activity: Not on file   Other Topics Concern     Parent/sibling w/ CABG, MI or angioplasty before 65F 55M? Not Asked   Social History Narrative    3/6/2019: Spoke with Patient's Son Gildardo, patient has been estranged from her family for the past 15 years until this fall when she was hospitalized and found to have a stroke. Per her son, she has had a history of anger issues and outburst and they wonder if she has some sort of underlying mental illness. She now has issues with memory and continues to have angry outburst. It has been recommended by the nurse practitioner at [facility] that Gildardo applies for guardianship, which is has been planning to do but is somewhat hesitant to complete due to his strained relationship with his mother.          PHYSICAL EXAM  Vitals:    03/09/19 2100 03/09/19 2102 03/09/19 2115 03/09/19 2130   BP: 98/53 130/76 (!) 135/112 108/87   BP Location: Left arm Left arm Left arm Left arm   Pulse: 113 87 138 107   Resp: 19 19 29 14   Temp:    97.8  F (36.6  C)   TempSrc:    Axillary   SpO2: 90% 90% 95% 96%   Weight:       Height:         General - Awake and alert, not in any obvious distress. Afebrile, not pale, well hydrated.  Head - Normocephalic, atraumatic.  Eyes - Extraocular movements are intact bilaterally. Sclera and conjunctiva clear. Lids without lesions  Mouth - Oropharynx is clear without exudates.  Neck - No cervical adenopathy, thyromegally, JVD or carotid bruits noted.  Lungs - Clear to auscultation bilaterally, no wheezes, rales or rhonchi.  CV - S1S2 irregular, no murmurs, rubs or gallops.  Abdomen - Non-tender, non-distended, positive bowel sounds, no masses, no hepatosplenomegaly. No rebound or guarding.   Upper Extremities - No edema or deformities. Radial pulses strong bilaterally- irregularly irregular.  Lower Extremities - No edema.   Skin - Skin exam is non focal but grossly warm,  dry, intact. No rashes or erythema.  Neurologic - Cranial nerves 2-12 intact. Muscle tone, bulk and strength within normal limits throughout. No arm drift.  Psych - Judgment and mental status are clear, patient has reasonable insight. Mood is stable.        LABORATORY  Results for orders placed or performed during the hospital encounter of 03/09/19   CT Head w/o Contrast    Narrative    CT SCAN OF THE HEAD WITHOUT CONTRAST   3/9/2019 5:24 PM     HISTORY: Altered level of consciousness (LOC), unexplained.  73-year-old with prior CNS hemorrhage.  Altered mental status.    TECHNIQUE: Noncontrast axial CT images of the head were obtained. .    COMPARISON:  None.     FINDINGS: There is mild cerebral volume loss. There is  encephalomalacia in the left occipital lobe from prior infarction.  There are chronic lacunar infarctions in the left thalamus and right  basal ganglia. Patchy low attenuation of the white matter is  nonspecific, though most likely due to moderate small vessel ischemic  disease. No evidence of hemorrhage, mass or recent infarction. The  cerebellum and brainstem are unremarkable. The globes and orbits are  unremarkable.  The imaged portions of the paranasal sinuses and  mastoid air cells are clear.  The skull base and calvaria are intact.   The external auditory canals are occluded with cerumen bilaterally.  Small amount of gas in the soft tissues of the right infratemporal  fossa is likely within the vasculature from IV fluids.      Impression    IMPRESSION:   1. No acute findings by CT.  2. Moderate-sized chronic infarction in the left occipital lobe.  3. Chronic lacunar infarctions in the left thalamus and in the right  basal ganglia.  4. Moderate small vessel disease.  5. Cerumen occludes the external auditory canals bilaterally.    CED ZAVALA MD   Cervical spine CT w/o contrast    Narrative    CT OF THE CERVICAL SPINE WITHOUT CONTRAST   3/9/2019 5:25 PM     COMPARISON: None    HISTORY: Fall.      TECHNIQUE: Axial images of the cervical spine were acquired without  intravenous contrast. Multiplanar reformations were created.      FINDINGS: There is no fracture or acute traumatic malalignment of the  cervical spine. There is 3 mm of anterolisthesis of C3 on C4 and 2 mm  of retrolisthesis of C5 on C6 and 2 mm of anterolisthesis of C7 on T1  due to degenerative changes. There is severe loss of disc height at  C5-6 and mild loss disc height at C2-3 and C3-4. The odontoid is  intact. The cervical medullary junction is patent. The paraspinous  soft tissues are unremarkable.    C2-3: The facets on the right side are fused. The spinal canal and  neural foramina are patent.    C3-4: Shallow disc bulge and osteophytes. Severe facet arthropathy on  the left side. Spinal canal is patent. Right neural foramen is patent.  The left neural foramen is moderately narrowed.    C4-5: Shallow disc bulge and osteophytes. The spinal canal and neural  foramina are patent. There is mild facet arthropathy on the left side.    C5-6: Bulging disc and osteophytes. Spinal canal is mildly narrow. The  neural foramina are moderately to severely narrowed on the right and  moderately narrowed on the left.    C6-7: Spinal canal and neural foramen are patent.    C7-T1: Moderate facet arthropathy on the left side. The left neural  foramen is moderately narrowed. The spinal canal and right neural  foramen are patent.      Impression    IMPRESSION:  1. No fracture or traumatic malalignment.  2. Multilevel degenerative disc disease which includes mild central  spinal stenosis at C5-6.  3. Neural foraminal stenosis and facet degenerative changes at  multiple levels.    Radiation dose for this scan was reduced using automated exposure  control, adjustment of the mA and/or kV according to patient size, or  iterative reconstruction technique    CED ZAVALA MD   CBC with platelets differential   Result Value Ref Range    WBC 13.7 (H) 4.0 - 11.0 10e9/L     RBC Count 4.72 3.8 - 5.2 10e12/L    Hemoglobin 14.9 11.7 - 15.7 g/dL    Hematocrit 46.2 35.0 - 47.0 %    MCV 98 78 - 100 fl    MCH 31.6 26.5 - 33.0 pg    MCHC 32.3 31.5 - 36.5 g/dL    RDW 13.9 10.0 - 15.0 %    Platelet Count 248 150 - 450 10e9/L    Diff Method Automated Method     % Neutrophils 85.3 %    % Lymphocytes 8.8 %    % Monocytes 4.8 %    % Eosinophils 0.4 %    % Basophils 0.5 %    % Immature Granulocytes 0.2 %    Nucleated RBCs 0 0 /100    Absolute Neutrophil 11.7 (H) 1.6 - 8.3 10e9/L    Absolute Lymphocytes 1.2 0.8 - 5.3 10e9/L    Absolute Monocytes 0.7 0.0 - 1.3 10e9/L    Absolute Eosinophils 0.1 0.0 - 0.7 10e9/L    Absolute Basophils 0.1 0.0 - 0.2 10e9/L    Abs Immature Granulocytes 0.0 0 - 0.4 10e9/L    Absolute Nucleated RBC 0.0    Comprehensive metabolic panel   Result Value Ref Range    Sodium 137 133 - 144 mmol/L    Potassium 4.7 3.4 - 5.3 mmol/L    Chloride 106 94 - 109 mmol/L    Carbon Dioxide 23 20 - 32 mmol/L    Anion Gap 8 3 - 14 mmol/L    Glucose 130 (H) 70 - 99 mg/dL    Urea Nitrogen 19 7 - 30 mg/dL    Creatinine 0.80 0.52 - 1.04 mg/dL    GFR Estimate 72 >60 mL/min/[1.73_m2]    GFR Estimate If Black 84 >60 mL/min/[1.73_m2]    Calcium 9.7 8.5 - 10.1 mg/dL    Bilirubin Total 0.6 0.2 - 1.3 mg/dL    Albumin 3.8 3.4 - 5.0 g/dL    Protein Total 8.1 6.8 - 8.8 g/dL    Alkaline Phosphatase 115 40 - 150 U/L    ALT 33 0 - 50 U/L    AST 20 0 - 45 U/L   UA with Microscopic   Result Value Ref Range    Color Urine Yellow     Appearance Urine Clear     Glucose Urine Negative NEG^Negative mg/dL    Bilirubin Urine Negative NEG^Negative    Ketones Urine Negative NEG^Negative mg/dL    Specific Gravity Urine 1.015 1.003 - 1.035    Blood Urine Negative NEG^Negative    pH Urine 7.0 5.0 - 7.0 pH    Protein Albumin Urine Negative NEG^Negative mg/dL    Urobilinogen mg/dL 2.0 0.0 - 2.0 mg/dL    Nitrite Urine Negative NEG^Negative    Leukocyte Esterase Urine Negative NEG^Negative    Source Catheterized Urine     WBC  Urine <1 0 - 5 /HPF    RBC Urine 3 (H) 0 - 2 /HPF    Mucous Urine Present (A) NEG^Negative /LPF    Hyaline Casts 3 (H) 0 - 2 /LPF       EKG: A Fib      ASSESSMENT/PLAN    Principal Problem:      Neurological deficit present: Patient with sudden change in her baseline cognitive function who presented with falls, speech changes, troubles walking and trouble swallowing directions which are new as of today.  CT done on arrival showed chronic ischemic changes but nothing acute.  She does have obvious garbling during examination where she is thoughts of a sentence with the first couple of words been normal and the rest been garbled.  I am concerned for a stroke at this point especially with a history of A. fib and her presenting with RVR without anticoagulation.  -Admit to ICU with  - Neurochecks  - Close monitoring of vitals  -Obtain a CTA to further evaluate        Atrial fibrillation with RVR (H): On 360 mg diltiazem ER but now presenting with heart rates in the 140s on admission.  Patient received 10 mg diltiazem bolus in the ED but this significantly dropped her blood pressure to the point where she required fluid resuscitation.  She was subsequently started on 5 mg/h drip but again her blood pressure was unable to tolerate this.  I contacted telemetry ICU with regards to recommendation, Had been advised in the recent past to start anticoagulation once cleared by neurology (CHADVASc 4) though family elected not to follow up with neurology regarding this.  -Bolus amiodarone followed by maintenance  -Continue close telemetry monitoring      Active Problems:    Elevated WBC: Afebrile, lungs sound clear, urinalysis looks normal and no other identifiable source of infection at this point.  -Repeat studies in the morning      HLD (hyperlipidemia): Continue PTA Lipitor       Recurrent falls: Noted on her problem list and with this would recommend against anticoagulation.      Cognitive impairment: Reported to be able to  carry out a conversation effectively at baseline per her living facility.  Still seems able to understand one is seen but his speech is garbled currently  May need to be redirected this admission especially with everything that is going on.      Current moderate episode of major depressive disorder, unspecified whether recurrent (H): Continue PTA of Zoloft      Other constipation: Continue PTA MiraLAX and Senokot      Gastroesophageal reflux disease with esophagitis: Continue PTA omeprazole    Prophylaxis -Mechanical DVT prophylaxis.  Disposition -Pending further workup to determine because of recent changes and stabilization of heart rate.    Attestation:   I have reviewed today's vital signs, notes, medications, labs and imaging.   Amount of time spent on this H&P note: 45 minutes.   Jovana Deluna MD

## 2019-03-10 NOTE — PLAN OF CARE
HR 90s-110s in A-Fib w/ occ PVCs, BPs labile but mostly hypertensive, LS clear, denies pain.  Continues on heparin drip and amiodarone drip.  Has been up in chair x2 this afternoon, ate only a couple bites of dinner.  Impulsively get up from bed/chair on her own setting off the bed/chair alarms frequently.  Continues to have expressive aphasia more pronounced when she is frustrated or mad.

## 2019-03-10 NOTE — PROGRESS NOTES
"[facility] [place]MISSION NOTE    Patient admitted to room 1004 at approximately 2000 via cart from emergency room. Patient was accompanied by other:ED RN and tech.     Verbal SBAR report received from Deann BASS prior to patient arrival.     Patient ambulated to bed with one assist. Patient alert and oriented X 1. The patient is not having any pain.  . Admission vital signs: Blood pressure 108/87, pulse 107, temperature 97.8  F (36.6  C), temperature source Axillary, resp. rate 14, height 1.575 m (5' 2\"), weight 55.6 kg (122 lb 9.2 oz), SpO2 96 %, not currently breastfeeding. Patient was oriented to plan of care, call light, bed controls, tv, telephone, bathroom and visiting hours.     Risk Assessment    The following safety risks were identified during admission: fall. Yellow risk band applied: YES.     Skin Initial Assessment    This writer admitted this patient and completed a full skin assessment and Hood score in the Adult PCS flowsheet. Appropriate interventions initiated as needed.     Secondary skin check completed by KALI Pathak.    Hood Risk Assessment  Sensory Perception: 3-->slightly limited  Moisture: 4-->rarely moist  Activity: 3-->walks occasionally  Mobility: 3-->slightly limited  Nutrition: 2-->probably inadequate  Friction and Shear: 2-->potential problem  Hood Score: 17  Bed Support Surface: Atmos Air mattress  Hood Intervention(s) Implemented: HOB elevated 30 degrees or less, repositioned/turned q2hr    Arianna Boswell    "

## 2019-03-10 NOTE — PHARMACY-ANTICOAGULATION SERVICE
Heparin 10a(Xa) =     Lab Results   Component Value Date    AXA 0.14 03/10/2019       Goal level of: 0.15-0.35 IU/mL    Heparin instructions: Change heparin infusion rate to 750 units/hr    Recheck next heparin 10a(Xa): in 6 hours    Heparin dosed per [facility] Protocol. Monitor platelets every three days while on anticoagulation therapy.

## 2019-03-10 NOTE — PROGRESS NOTES
Pt admitted with Afib with RVR. She is known to have it and is on Dilt ER (outpt).   Upon admission was started with Dilt IV bolus and gtts but had to be stopped due to persistent hypotension. We will start Amio bolus followed by drip.   Consider checking Mg level again. Add on to labs drawn already.  Consider checking Echo.    Jay Islas MD.  Pulmonary and Critical Care.  03/09/2019  10:24 PM

## 2019-03-10 NOTE — ED PROVIDER NOTES
History     Chief Complaint   Patient presents with     Altered Mental Status     garbled speech with 2 falls     HPI  Citlalli Villarreal is a 73 year old female who presented from [facility] with a history of expressive aphasia, atrial fibrillation with CVA, dementia with agitation, nursing home with their concerns for altered level of consciousness, garbled speech and it was unclear if this represented-a change from her baseline.  She had a couple of falls today that were unwitnessed and it was also unclear if she had a secondary injury from the fall.  Many of the concerns regarding her mental status were mentioned as present at her last hospitalization which was in the last week and she was just discharged from this facility last week.  She was transported by ambulance and glucose was not low.      Allergies:  No Known Allergies    Problem List:    Patient Active Problem List    Diagnosis Date Noted     Expressive aphasia 03/10/2019     Priority: Medium     Embolic stroke due to atrial fibrillation (H) 03/10/2019     Priority: Medium     History of embolic stroke with hemorrhagic conversion 10/15/2018. 03/10/2019     Priority: Medium     Other constipation 03/09/2019     Priority: Medium     Gastroesophageal reflux disease with esophagitis 03/09/2019     Priority: Medium     Atrial fibrillation with RVR (H) 03/06/2019     Priority: Medium     Cerebral infarction (H)      Priority: Medium     Current moderate episode of major depressive disorder, unspecified whether recurrent (H) 01/02/2019     Priority: Medium     Dementia without behavioral disturbance, unspecified dementia type 01/02/2019     Priority: Medium     Chronic idiopathic constipation 12/13/2018     Priority: Medium     Cognitive impairment 11/21/2018     Priority: Medium     Gastrointestinal hemorrhage, unspecified gastrointestinal hemorrhage type 11/21/2018     Priority: Medium     Altered mental status, unspecified altered mental status type  10/16/2018     Priority: Medium     Carotid stenosis, asymptomatic, bilateral 10/16/2018     Priority: Medium     Deny-neglect of right side 10/16/2018     Priority: Medium     Intracranial atherosclerosis 10/16/2018     Priority: Medium     Overview:   Severe and diffuse       Compression of brain (H) 10/15/2018     Priority: Medium     HLD (hyperlipidemia) 10/15/2018     Priority: Medium     Recurrent falls 10/15/2018     Priority: Medium     Traumatic rhabdomyolysis, initial encounter (H) 10/15/2018     Priority: Medium     Fracture of right distal radius 10/14/2018     Priority: Medium     Overview:   Added automatically from request for surgery [id number]       Intracranial bleed (H) 10/14/2018     Priority: Medium        Past Medical History:    Past Medical History:   Diagnosis Date     Atrial fibrillation (H)      Cerebral infarction (H)      Gastroesophageal reflux disease      Gastrointestinal hemorrhage      History of embolic stroke with hemorrhagic conversion 10/15/2018. 3/10/2019     Hyperlipemia      Major depressive disorder      Mild cognitive impairment, so stated      Neurologic neglect syndrome      Neuromuscular dysfunction of bladder, unspecified      Nontraumatic intracranial hemorrhage (H)      Occlusion and stenosis of bilateral carotid arteries      Radial fracture      Repeated falls      Right wrist fracture 10/2018       Past Surgical History:    Past Surgical History:   Procedure Laterality Date     FOREARM SURGERY  10/2018    fracture repair       Family History:    Family History   Family history unknown: Yes       Social History:  Marital Status:  Single [1]  Social History     Tobacco Use     Smoking status: Unknown If Ever Smoked     Smokeless tobacco: Never Used   Substance Use Topics     Alcohol use: No     Frequency: Never     Drug use: No        Medications:      No current outpatient medications on file.      Review of Systems   Unable to perform ROS: Dementia       Physical Exam  "  BP: 122/85  Pulse: 147  Heart Rate: 151  Temp: 97.9  F (36.6  C)  Resp: 18  Height: 157.5 cm (5' 2\")  Weight: 53.5 kg (118 lb)  SpO2: 95 %      Physical Exam   Constitutional: She appears distressed.   HENT:   Head: Atraumatic.   Mouth/Throat: Oropharynx is clear and moist.   Eyes: Conjunctivae are normal.   Neck: Neck supple.   Cardiovascular: Tachycardia present.   No murmur heard.  Pulmonary/Chest: Effort normal and breath sounds normal. No stridor. No respiratory distress. She has no wheezes.   Abdominal: Soft. Bowel sounds are normal. She exhibits no mass. There is no tenderness. There is no guarding.   Musculoskeletal: She exhibits no edema.   Neurological: She exhibits normal muscle tone.   Skin: No rash noted. She is not diaphoretic.     Patient's exam was difficult as she would lash out with her arms periodically.  When she was not being touched she would calm but she would continue to curse.  This appears to be her baseline as it was  described on last hospitalization.  She has no obvious external signs of new trauma.    ED Course        Procedures                 EKG Interpretation:      Interpreted by Soy Lou MD  EKG done at 1616 hrs. demonstrates a atrial fibrillation that 140 bpm with a normal axis and ST depression in the lateral leads.  T wave inversion laterally.  T wave inversion inferiorly.  Findings consistent with strain pattern.  Poor R progression V1 through V3.  Normal conduction.  No ectopy.  Atrial fibrillation rapid ventricular rate 140 bpm.  This is significantly altered from her EKG done March 7, 2019 when she was in a atrial fibrillation of 52 bpm      Critical Care time:  none               Results for orders placed or performed during the hospital encounter of 03/09/19 (from the past 24 hour(s))   CBC with platelets differential   Result Value Ref Range    WBC 13.7 (H) 4.0 - 11.0 10e9/L    RBC Count 4.72 3.8 - 5.2 10e12/L    Hemoglobin 14.9 11.7 - 15.7 g/dL    Hematocrit " 46.2 35.0 - 47.0 %    MCV 98 78 - 100 fl    MCH 31.6 26.5 - 33.0 pg    MCHC 32.3 31.5 - 36.5 g/dL    RDW 13.9 10.0 - 15.0 %    Platelet Count 248 150 - 450 10e9/L    Diff Method Automated Method     % Neutrophils 85.3 %    % Lymphocytes 8.8 %    % Monocytes 4.8 %    % Eosinophils 0.4 %    % Basophils 0.5 %    % Immature Granulocytes 0.2 %    Nucleated RBCs 0 0 /100    Absolute Neutrophil 11.7 (H) 1.6 - 8.3 10e9/L    Absolute Lymphocytes 1.2 0.8 - 5.3 10e9/L    Absolute Monocytes 0.7 0.0 - 1.3 10e9/L    Absolute Eosinophils 0.1 0.0 - 0.7 10e9/L    Absolute Basophils 0.1 0.0 - 0.2 10e9/L    Abs Immature Granulocytes 0.0 0 - 0.4 10e9/L    Absolute Nucleated RBC 0.0    Comprehensive metabolic panel   Result Value Ref Range    Sodium 137 133 - 144 mmol/L    Potassium 4.7 3.4 - 5.3 mmol/L    Chloride 106 94 - 109 mmol/L    Carbon Dioxide 23 20 - 32 mmol/L    Anion Gap 8 3 - 14 mmol/L    Glucose 130 (H) 70 - 99 mg/dL    Urea Nitrogen 19 7 - 30 mg/dL    Creatinine 0.80 0.52 - 1.04 mg/dL    GFR Estimate 72 >60 mL/min/[1.73_m2]    GFR Estimate If Black 84 >60 mL/min/[1.73_m2]    Calcium 9.7 8.5 - 10.1 mg/dL    Bilirubin Total 0.6 0.2 - 1.3 mg/dL    Albumin 3.8 3.4 - 5.0 g/dL    Protein Total 8.1 6.8 - 8.8 g/dL    Alkaline Phosphatase 115 40 - 150 U/L    ALT 33 0 - 50 U/L    AST 20 0 - 45 U/L   UA with Microscopic   Result Value Ref Range    Color Urine Yellow     Appearance Urine Clear     Glucose Urine Negative NEG^Negative mg/dL    Bilirubin Urine Negative NEG^Negative    Ketones Urine Negative NEG^Negative mg/dL    Specific Gravity Urine 1.015 1.003 - 1.035    Blood Urine Negative NEG^Negative    pH Urine 7.0 5.0 - 7.0 pH    Protein Albumin Urine Negative NEG^Negative mg/dL    Urobilinogen mg/dL 2.0 0.0 - 2.0 mg/dL    Nitrite Urine Negative NEG^Negative    Leukocyte Esterase Urine Negative NEG^Negative    Source Catheterized Urine     WBC Urine <1 0 - 5 /HPF    RBC Urine 3 (H) 0 - 2 /HPF    Mucous Urine Present (A)  NEG^Negative /LPF    Hyaline Casts 3 (H) 0 - 2 /LPF   CT Head w/o Contrast    Narrative    CT SCAN OF THE HEAD WITHOUT CONTRAST   3/9/2019 5:24 PM     HISTORY: Altered level of consciousness (LOC), unexplained.  73-year-old with prior CNS hemorrhage.  Altered mental status.    TECHNIQUE: Noncontrast axial CT images of the head were obtained. .    COMPARISON:  None.     FINDINGS: There is mild cerebral volume loss. There is  encephalomalacia in the left occipital lobe from prior infarction.  There are chronic lacunar infarctions in the left thalamus and right  basal ganglia. Patchy low attenuation of the white matter is  nonspecific, though most likely due to moderate small vessel ischemic  disease. No evidence of hemorrhage, mass or recent infarction. The  cerebellum and brainstem are unremarkable. The globes and orbits are  unremarkable.  The imaged portions of the paranasal sinuses and  mastoid air cells are clear.  The skull base and calvaria are intact.   The external auditory canals are occluded with cerumen bilaterally.  Small amount of gas in the soft tissues of the right infratemporal  fossa is likely within the vasculature from IV fluids.      Impression    IMPRESSION:   1. No acute findings by CT.  2. Moderate-sized chronic infarction in the left occipital lobe.  3. Chronic lacunar infarctions in the left thalamus and in the right  basal ganglia.  4. Moderate small vessel disease.  5. Cerumen occludes the external auditory canals bilaterally.    CED ZAVALA MD   Cervical spine CT w/o contrast    Narrative    CT OF THE CERVICAL SPINE WITHOUT CONTRAST   3/9/2019 5:25 PM     COMPARISON: None    HISTORY: Fall.     TECHNIQUE: Axial images of the cervical spine were acquired without  intravenous contrast. Multiplanar reformations were created.      FINDINGS: There is no fracture or acute traumatic malalignment of the  cervical spine. There is 3 mm of anterolisthesis of C3 on C4 and 2 mm  of retrolisthesis of C5  on C6 and 2 mm of anterolisthesis of C7 on T1  due to degenerative changes. There is severe loss of disc height at  C5-6 and mild loss disc height at C2-3 and C3-4. The odontoid is  intact. The cervical medullary junction is patent. The paraspinous  soft tissues are unremarkable.    C2-3: The facets on the right side are fused. The spinal canal and  neural foramina are patent.    C3-4: Shallow disc bulge and osteophytes. Severe facet arthropathy on  the left side. Spinal canal is patent. Right neural foramen is patent.  The left neural foramen is moderately narrowed.    C4-5: Shallow disc bulge and osteophytes. The spinal canal and neural  foramina are patent. There is mild facet arthropathy on the left side.    C5-6: Bulging disc and osteophytes. Spinal canal is mildly narrow. The  neural foramina are moderately to severely narrowed on the right and  moderately narrowed on the left.    C6-7: Spinal canal and neural foramen are patent.    C7-T1: Moderate facet arthropathy on the left side. The left neural  foramen is moderately narrowed. The spinal canal and right neural  foramen are patent.      Impression    IMPRESSION:  1. No fracture or traumatic malalignment.  2. Multilevel degenerative disc disease which includes mild central  spinal stenosis at C5-6.  3. Neural foraminal stenosis and facet degenerative changes at  multiple levels.    Radiation dose for this scan was reduced using automated exposure  control, adjustment of the mA and/or kV according to patient size, or  iterative reconstruction technique    CED ZAVALA MD   Lactic acid level STAT for sepsis protocol   Result Value Ref Range    Lactate for Sepsis Protocol 1.7 0.7 - 2.0 mmol/L   Magnesium   Result Value Ref Range    Magnesium 2.1 1.6 - 2.3 mg/dL   CTA Head Neck with Contrast    Narrative    CT ANGIOGRAM OF THE HEAD AND NECK WITHOUT AND WITH CONTRAST  3/9/2019  11:25 PM     COMPARISON: Head CT 3/9/2019    HISTORY: Neurological deficit(s),  subacute. Fall X2 earlier on today  with associated confusion, ongoing word finding problems/garbled  speech.    TECHNIQUE: Precontrast localizing scans were followed by CT  angiography with an injection of 70 mL Isovue-370 nonionic intravenous  contrast material with scans through the head and neck.  Images were  transferred to a separate 3-D workstation where multiplanar  reformations and 3-D images were created. Estimates of carotid  stenoses are made relative to the distal internal carotid artery  diameters except as noted.      FINDINGS:   HEAD CTA:  ANTERIOR CIRCULATION: Diffuse mild stenoses. No high-grade stenosis.  Essentially fetal origins of the posterior cerebral arteries  bilaterally.    POSTERIOR CIRCULATION: Developmentally small vertebrobasilar system.  Areas of basilar and bilateral V4 segment signal attenuation on MIP  images are most consistent with artifact.    ANEURYSM/VASCULAR MALFORMATION: None.    NECK CTA:  RIGHT CAROTID: 90% or greater stenosis in the right internal carotid  artery origin based on NASCET criteria.    LEFT CAROTID: 70-80% stenosis in the distal left common carotid  artery. 40-50% stenosis in the proximal left internal carotid artery  based on NASCET criteria.     VERTEBRAL ARTERIES: Developmentally small vertebral arteries  bilaterally. The right is dominant.     AORTIC ARCH: Classic aortic arch anatomy. 60-70% stenosis in the  origin of the left common carotid artery. 80% stenosis in the origin  of the left subclavian artery. 50% stenosis in the origin of the right  common carotid artery.      Impression    IMPRESSION:  HEAD CTA:  1.  Diffuse mild intracranial stenoses. No high-grade stenosis.  2.  Essential fetal origins of the posterior cerebral arteries  bilaterally.  3.  Developmentally small vertebrobasilar system.    NECK CTA:  1.  50% stenosis in the origin of the right common carotid artery. 90%  or greater stenosis in the right internal carotid artery origin  based  on NASCET criteria.  2.  60-70% stenosis in the origin of the left common carotid artery.  70-80% stenosis in the distal left common carotid artery. 40-50%  stenosis in the proximal left internal carotid artery based on NASCET  criteria.  3.  80% stenosis in the origin of the left subclavian artery.  4.  Developmentally small vertebral arteries.     Radiation dose for this scan was reduced using automated exposure  control, adjustment of the mA and/or kV according to patient size, or  iterative reconstruction technique    Radiation dose for this scan was reduced using automated exposure  control, adjustment of the mA and/or kV according to patient size, or  iterative reconstruction technique    BOY HANSEN MD   Basic metabolic panel   Result Value Ref Range    Sodium 140 133 - 144 mmol/L    Potassium 4.1 3.4 - 5.3 mmol/L    Chloride 108 94 - 109 mmol/L    Carbon Dioxide 25 20 - 32 mmol/L    Anion Gap 7 3 - 14 mmol/L    Glucose 102 (H) 70 - 99 mg/dL    Urea Nitrogen 15 7 - 30 mg/dL    Creatinine 0.77 0.52 - 1.04 mg/dL    GFR Estimate 76 >60 mL/min/[1.73_m2]    GFR Estimate If Black 89 >60 mL/min/[1.73_m2]    Calcium 8.6 8.5 - 10.1 mg/dL   CBC with platelets differential   Result Value Ref Range    WBC 8.6 4.0 - 11.0 10e9/L    RBC Count 4.34 3.8 - 5.2 10e12/L    Hemoglobin 13.6 11.7 - 15.7 g/dL    Hematocrit 43.0 35.0 - 47.0 %    MCV 99 78 - 100 fl    MCH 31.3 26.5 - 33.0 pg    MCHC 31.6 31.5 - 36.5 g/dL    RDW 14.0 10.0 - 15.0 %    Platelet Count 213 150 - 450 10e9/L    Diff Method Automated Method     % Neutrophils 63.3 %    % Lymphocytes 26.2 %    % Monocytes 6.1 %    % Eosinophils 3.3 %    % Basophils 1.0 %    % Immature Granulocytes 0.1 %    Nucleated RBCs 0 0 /100    Absolute Neutrophil 5.4 1.6 - 8.3 10e9/L    Absolute Lymphocytes 2.3 0.8 - 5.3 10e9/L    Absolute Monocytes 0.5 0.0 - 1.3 10e9/L    Absolute Eosinophils 0.3 0.0 - 0.7 10e9/L    Absolute Basophils 0.1 0.0 - 0.2 10e9/L    Abs Immature  Granulocytes 0.0 0 - 0.4 10e9/L    Absolute Nucleated RBC 0.0    Magnesium   Result Value Ref Range    Magnesium 2.1 1.6 - 2.3 mg/dL   Care Transition RN/SW IP Consult    Narrative    Carol Montana LICSW     3/10/2019  8:04 AM  CARE TRANSITION SOCIAL WORK INITIAL ASSESSMENT:      Met with: Patient and Family.    DATA  Principal Problem:    Expressive aphasia  Active Problems:    HLD (hyperlipidemia)    Recurrent falls    Cognitive impairment    Current moderate episode of major depressive disorder,   unspecified whether recurrent (H)    Atrial fibrillation with RVR (H)    Other constipation    Gastroesophageal reflux disease with esophagitis    Embolic stroke due to atrial fibrillation (H)    History of embolic stroke with hemorrhagic conversion   10/15/2018.       Primary Care Clinic Name: ([facility])  Primary Care MD Name: (Joe)  Contact information and PCP information verified: Yes      ASSESSMENT  Cognitive Status: awake and alert.       Resources List: Skilled Nursing Facility              Description of Support System: Supportive, Involved   Who is your support system?: Facility resident(s)/Staff, Children     Support Assessment: Adequate family and caregiver support,   Adequate social supports   Insurance Concerns: No Insurance issues identified        Pt lives at [facility] Phone (Main   Phone:[phone] Admissions Phone:[phone] Fax:   [phone]) and is on a bed hold. Pt will return there when   stable.     PLAN    Return to LTC facility        Carol Montana MSW, JOSE, [facility] [phone]           Medications   sodium chloride 0.9% infusion ( Intravenous Rate/Dose Verify 3/10/19 0755)   acetaminophen (TYLENOL) tablet 650 mg (650 mg Oral Given 3/10/19 0754)   aspirin (ASA) chewable tablet 162 mg (162 mg Oral Given 3/10/19 0755)   atorvastatin (LIPITOR) tablet 20 mg (20 mg Oral Given 3/10/19 0754)   gabapentin (NEURONTIN) capsule 100 mg (100 mg Oral Given 3/9/19 3130)    sertraline (ZOLOFT) tablet 25 mg (25 mg Oral Given 3/10/19 0755)   sennosides (SENOKOT) tablet 1 tablet (1 tablet Oral Given 3/10/19 0755)   polyethylene glycol (MIRALAX/GLYCOLAX) Packet 17 g (17 g Oral Given 3/10/19 0755)   naloxone (NARCAN) injection 0.1-0.4 mg (not administered)   melatonin tablet 1 mg (not administered)   ondansetron (ZOFRAN) injection 4 mg (not administered)   omeprazole (priLOSEC) CR capsule 20 mg (20 mg Oral Given 3/10/19 0754)   amiodarone (NEXTERONE) 250 mg in D5W 250 mL infusion (1 mg/min Intravenous New Bag 3/10/19 0443)   diltiazem (CARDIZEM) tablet 60 mg (60 mg Oral Given 3/10/19 0800)   heparin infusion 25,000 units in 0.45% NaCl 250 mL (650 Units/hr Intravenous New Bag 3/10/19 0750)   amiodarone (NEXTERONE) 250 mg in D5W 250 mL infusion (0.5 mg/min Intravenous Rate/Dose Verify 3/10/19 0747)   diltiazem (CARDIZEM) injection 10 mg (10 mg Intravenous Given 3/9/19 1627)   0.9% sodium chloride BOLUS (0 mLs Intravenous Stopped 3/9/19 1728)   QUEtiapine (SEROquel) tablet 25 mg (25 mg Oral Given 3/9/19 1903)   amiodarone (NEXTERONE) bolus 150 mg (150 mg Intravenous Given 3/9/19 2245)   iopamidol (ISOVUE-370) solution 70 mL (70 mLs Intravenous Given 3/9/19 2320)   sodium chloride 0.9 % bag 500mL for CT scan flush use (100 mLs Intravenous Given 3/9/19 2320)   heparin Loading Dose bolus dose from infusion pump 3,100 Units (3,100 Units Intravenous Given 3/10/19 0745)       Assessments & Plan (with Medical Decision Making)     MDM: Citlalli Villarreal is a 73 year old female who presented from nursing home with a history of dementia and atrial fibrillation, prior CVA and possible altered level of consciousness from her baseline.  Some garbled speech and 2 possible falls at the nursing home.  Her mental status was fluctuant in the emergency department but appears to be consistent with her baseline mental status.  During her falls a CT head CT cervical spine was performed.  Laboratory testing was  reassuring.  Primary concern on her presentation was her atrial fibrillation with rapid ventricular rate.  This was slowed with IV diltiazem.  We are able to titrate her heart rate down to the 105 110 range although systolic blood pressures dropped into the 80-90 systolic range fluid bolus brought this back up to over 100 systolics.  I do not note a significant change in underlying neurologic status from her last evaluation.    I discussed with Dr. Deluna who accepts her for admission.  I attempted to determine family contacts on discussion with nursing but could not find family to discuss patient's status.         ED to Inpatient Handoff:    Discussed with Dr. Deluna  Patient accepted for Inpatient Stay  Pending studies include none  Code Status: Not Addressed           I have reviewed the nursing notes.    I have reviewed the findings, diagnosis, plan and need for follow up with the patient.          Medication List      There are no discharge medications for this visit.         Final diagnoses:   Dementia with behavioral disturbance, unspecified dementia type   Atrial fibrillation with RVR (H)       3/9/2019   [facility] INTENSIVE CARE     Soy Lou MD  03/10/19 1158       Soy Lou MD  03/10/19 1200

## 2019-03-10 NOTE — CONSULTS
CARE TRANSITION SOCIAL WORK INITIAL ASSESSMENT:      Met with: Patient and Family.    DATA  Principal Problem:    Expressive aphasia  Active Problems:    HLD (hyperlipidemia)    Recurrent falls    Cognitive impairment    Current moderate episode of major depressive disorder, unspecified whether recurrent (H)    Atrial fibrillation with RVR (H)    Other constipation    Gastroesophageal reflux disease with esophagitis    Embolic stroke due to atrial fibrillation (H)    History of embolic stroke with hemorrhagic conversion 10/15/2018.       [facility]: ( [facility])  Primary Care MD Name: (Diaz)  Contact information and PCP information verified: Yes      ASSESSMENT  Cognitive Status: awake and alert.       Resources List: Skilled Nursing Facility              Description of Support System: Supportive, Involved   Who is your support system?: Facility resident(s)/Staff, Children   Support Assessment: Adequate family and caregiver support, Adequate social supports   Insurance Concerns: No Insurance issues identified        Pt lives at [facility] Phone (Main Phone:[phone] Admissions Phone:[phone] Fax: [phone]) and is on a bed hold. Pt will return there when stable.     PLAN    Return to LTC facility        Carol JAMISON, [facility] Center[facility], [phone]

## 2019-03-10 NOTE — PHARMACY-ANTICOAGULATION SERVICE
Patient to start the heparin C-V/Vascular protocol with a goal anti 10a level of 0.15-0.35. Using a HT of 62 inches and a WT of 56.5 kg, a dosing WT of 52.7 kg was calculated. Based on this dosing WT, give patient a heparin bolus of 2100 units from the bag and then start a drip at 650 units/hr. Check the patient's anti 10a level 6 hours after starting the drip at ~1300.   Per C-V/Vascular protocol.    Chacho BurtD

## 2019-03-10 NOTE — PROGRESS NOTES
Dr. POLO made aware of persistent garbled, rambling speech/ expressive aphasia. Patient with equal strengths throughout.  Slight diminished sensation on right side.  Otherwise no focal deficits noted.  PERRL.  EOM's intact.     Arianna Boswell RN on 3/9/2019 at 10:33 PM

## 2019-03-10 NOTE — PROGRESS NOTES
Diltiazem infusion at 5mg/hr upon patient's arrival to unit.  SBP labile, with lowest SBP reading of 58-64.  Diltiazem infusion stopped due to hypotension.  -114, afib.  Blood pressure within normal range.  Will continue to monitor and assess closely.  Dr. POLO made aware of above via web page.      Arianna Boswell RN on 3/9/2019 at 9:42 PM

## 2019-03-10 NOTE — PROGRESS NOTES
Writer has reviewed initial/admission skin assessment and rachael assessment and agrees with documentation and assessment findings.

## 2019-03-11 NOTE — PROGRESS NOTES
Writer sat and talked with pt and explained that dentures are likely at [facility], pt much more understanding and calm/cooperative with staff this afternoon. HR controlled this after (60-70s). Amiodarone drip stopped per MD orders. Pt has declined wanting to get up in the chair multiple times today, wanting to stay in bed, repositioned frequently. Up to the commode with assist of one, denies any pain. Call light within reach. Alarms activated and audible.

## 2019-03-11 NOTE — PROGRESS NOTES
"Patient seen by SLP for bedside swallow evaluation to determine if patient is at risk for aspiration. Patient swallowed pills and juice with no s/s of aspiration. Patient refused all food items offered stating she was not a \"muncher\". Nursing stated patient has been swallowing safely as well. Recommend regular diet with supervision for first meal.  "

## 2019-03-11 NOTE — PROGRESS NOTES
"Brushed and cleaned pt upper dentures and gave them to her this AM and pt started yelling at staff that she needs her bottom dentures. Writer searched room and belongings and no lower dentures were found. Pt getting increasingly agitated about this and yelling at staff. Writer called [facility] to ask them to clarify if pt indeed has lower dentures. Eriberto, stated she's only ever seen pt with upper dentures but that their assessment said \"both\" she was unsure however where lower dentures were or if they were sent with pt when she was admitted. Pt refusing to eat anything until she has her \"mouth.\"  "

## 2019-03-11 NOTE — PHARMACY-ANTICOAGULATION SERVICE
Heparin 10a(Xa) =     Lab Results   Component Value Date    AXA 0.22 03/11/2019       Goal level of: 0.15-0.35 IU/mL    Heparin instructions: Continue rate of 750 units/hr    Recheck next heparin 10a(Xa): in the AM.    Heparin dosed per [place] Protocol. Monitor platelets every three days while on anticoagulation therapy.    Chacho BurtD

## 2019-03-11 NOTE — PROGRESS NOTES
"Care Transitions Progress Note:    Reason for Follow up:  Legal documents. There are no legal documents on the EMR. Spoke to [facility] Zacarias regarding POA and Health Care Agent. Deann reports the patient's three children have equal decision-making authority regarding health care decisions. Received document \"Evaluation of Residents Decision-Making Capacity\" and sent document to [facility].     Discharge Plan:  Return to [facility]    Discharge Planner   Discharge Plans in progress: LTC  Barriers to discharge plan: Medical stability  Follow up plan: Follow up with PCP.       Entered by: Rosanne Combs 03/11/2019 12:53 PM         Rosanne Combs RN, Care Coordinator  [phone]    "

## 2019-03-11 NOTE — PROGRESS NOTES
"   [facility]    Medicine Progress Note - Hospitalist Service       Date of Admission:  3/9/2019      Assessment & Plan      Citlalli Villarreal is a 73 year old female who presented on 3/9/2019 with confusion and speech change due to presumed new embolic stroke    Expressive aphasia - Patient noted by staff at [facility] to have change in her baseline cognitive function, including speech changes (\"garbled speech\"), troubles walking and trouble following directions which were new as of the day of admission.  CT head 3/9/19 showed chronic ischemic changes, but no acute, change, and importantly, no evidence of hemorrhage (see below).  Neuro exam is notable only for an expressive aphasia.  She word searches, often comes up with a word not pertinent, and repeats a single word once she says it.  She is capable of forming full, coherent sentences however.  Her speech is clear, not slurred; I find no dysarthria.  She has no motor weakness anywhere else, no facial asymmetry.  She appears to have sustained an Embolic stroke due to atrial fibrillation (H), not on anticoagulation; see below.  There is no contraindication found to starting anticoagulation at this point.  Will start a heparin gtts until such time as we can talk to her son to discuss risk/benefit of long-term anticoagulation.  Son Gildardo states he does not want to make any medical decisions for his mother.  Left message for son Lobo.  Spoke with patient PCP who agrees that the benefit of anticoagulation outweighs the risk, given that this is her second embolic stroke.  - Heparin gtts, pharmacy to dose.  - Observe Neuro status.  - Bedside swallow - passed       History of embolic stroke with hemorrhagic conversion 10/15/2018 - admitted to [facility] with evidence of stroke, believed to be embolic, but then with hemorrhagic conversion.  Due to hemorrhage, anticoagulation was not started, and Neurosurgery follow-up was advised to follow-up on the " "hemorrhage and to get advice on when anticoagulation could be restarted.  Was seen in Neurosurgery follow-up 10/31/18 by Kim Melissa CNP.  CT head done that visit, showed \"near complete resolution\" of hemorrhage.  Note suggests that clopidogrel be added at that time, and event monitor recommended.  Per note, if a-fib seen on event monitor, \"I would recommend Eliquis\".  Patient obviously has persistent a-fib, and now has clinical evidence of another embolic stroke.  There is no evidence of hemorrhage as above.  Therefore, I find no contraindication to starting systemic anticoagulation.  - Heparin gtts started; can convert to NOAC if son gives consent for long-term anticoagulation.  - would continue ASA due to history of carotid artery stenosis    Active Problems:    Atrial fibrillation with RVR (H) - outpatient rate control achieved with diltiazem  mg every day.  Previous Rx attempted with digoxin and amiodarone, but neither was continued, perhaps because they were ineffective.  She was hospitalized here within the last week for atrial fibrillation and rapid ventricular response.  Metoprolol was added to the patient's usual diltiazem, though metoprolol had to be discontinued due to bradycardia, rates sustained in the 50s and sometimes in the 40s, and as result she was discharged only on diltiazem  mg p.o. daily.  On arrival here, IV diltiazem was attempted, but resulted in hypotension.  As result, the patient is currently on an amiodarone continuous infusion.  - Will start at diltiazem 60 mg Q6H, which is lower than her preadmission dose of 360 mg daily.  - discharge amiodarone  - start metoprolol 25 mg PO BID  - Starting systemic anticoagulation as above.      Elevated WBC: WBC on admission 13.7.  Afebrile, lungs sound clear, urinalysis looks normal and no other identifiable source of infection noted.  WBC this morning 8.6.  -Resolved.       HLD (hyperlipidemia): On atorvastatin as outpatient.  - " Continue atorvastatin.       Cognitive Impairment, Dementia: SLUMs 11/30, OCA 11/30 per chart review.  Per son, Gildardo, patient has chronic issues with memory impairment and behavioral outburst.  He was reportedly applying for guardianship but he stated 3/11 that he does not want to make any medical decisions for his mother.  PCP notes their relationship is strained for many years.      Current moderate episode of major depressive disorder, unspecified whether recurrent (H): No information regarding recent mood.  - Continue preadmission sertraline.       Other constipation: Continue PTA MiraLAX and Senokot       Gastroesophageal reflux disease with esophagitis: Continue PTA omeprazole      Diet: Regular Diet Adult    DVT Prophylaxis: Heparin SQ  Silver Catheter: not present  Code Status: Full Code      Disposition Plan   Expected discharge: 1-2 days, recommended to prior living arrangement once RVR has improved.  Entered: Melissa Glass DO 03/11/2019, 1:38 PM       The patient's care was discussed with the Patient and Patient's Family.    Melissa Glass DO  Hospitalist Service  [facility]    ______________________________________________________________________    Interval History   --she is still having tachycardia, although improved  --she is fixated on finding her bottom dentures - believes EMS lost them.  Confirmed [facility] has them.  --denies any chest pain, shortness of breath    Data reviewed today: I reviewed all medications, new labs and imaging results over the last 24 hours. I personally reviewed no images or EKG's today.    Physical Exam   Vital Signs: Temp: 97.6  F (36.4  C) Temp src: Oral BP: 111/84 Pulse: 68 Heart Rate: 63 Resp: 13 SpO2: 96 % O2 Device: None (Room air)    Weight: 129 lbs 13.62 oz  Constitutional: awake, alert, cooperative, no apparent distress, and appears stated age  ENT: Normocephalic, without obvious abnormality, atraumatic, oral pharynx with  moist mucous membranes, tonsils without erythema or exudates  Respiratory: No increased work of breathing, good air exchange, clear to auscultation bilaterally, no crackles or wheezing  Cardiovascular: Irreg irreg, mild tachycardia, 2/6 systolic murmur heard at left sternal border  GI: No scars, normal bowel sounds, soft, non-distended, non-tender, no masses palpated, no hepatosplenomegally  Skin: no redness, warmth, or swelling  Musculoskeletal: motor strength is 5 out of 5 all extremities bilaterally  Neurologic: Alert, conversant, offers appropriate responses to my questions, though she word searches, often comes up with a word not pertinent, and repeats a single word once she says it.  She is capable of forming full, coherent sentences however.  Her speech is clear, not slurred; I find no dysarthria.  She has no motor weakness anywhere else, no facial asymmetry.   No gross motor or sensory deficits          Data   Recent Labs   Lab 03/10/19  0526 03/09/19  1615 03/06/19  1527 03/06/19  0552 03/06/19  0322   WBC 8.6 13.7*  --   --  11.8*   HGB 13.6 14.9  --   --  14.5   MCV 99 98  --   --  100    248  --   --  245    137  --   --  138   POTASSIUM 4.1 4.7  --   --  4.4   CHLORIDE 108 106  --   --  104   CO2 25 23  --   --  29   BUN 15 19  --   --  22   CR 0.77 0.80  --   --  0.84   ANIONGAP 7 8  --   --  5   RODOLFO 8.6 9.7  --   --  9.2   * 130*  --   --  112*   ALBUMIN  --  3.8  --   --  3.5   PROTTOTAL  --  8.1  --   --  7.6   BILITOTAL  --  0.6  --   --  0.4   ALKPHOS  --  115  --   --  109   ALT  --  33  --   --  47   AST  --  20  --   --  43   TROPI  --   --  0.021 0.028 0.028

## 2019-03-11 NOTE — PHARMACY-ANTICOAGULATION SERVICE
Heparin 10a(Xa) =     Lab Results   Component Value Date    AXA 0.26 03/10/2019       Goal level of: 0.15-0.35 IU/mL    Heparin instructions: Continue rate of 750 units/hr    Recheck next heparin 10a(Xa): in the AM.    Heparin dosed per [place] Protocol. Monitor platelets every three days while on anticoagulation therapy.    NGUYỄN Vickers

## 2019-03-12 NOTE — DISCHARGE SUMMARY
"[facility]  Hospitalist Discharge Summary       Date of Admission:  3/9/2019  Date of Discharge:  3/12/2019  Discharging Provider: Melissa Glass DO      Discharge Diagnoses   Principal Problem:    Expressive aphasia (3/10/2019)  Active Problems:    HLD (hyperlipidemia) (10/15/2018)    Recurrent falls (10/15/2018)    Cognitive impairment (11/21/2018)    Current moderate episode of major depressive disorder, unspecified whether recurrent (H) (1/2/2019)    Atrial fibrillation with RVR (H) (3/6/2019)    Other constipation (3/9/2019)    Gastroesophageal reflux disease with esophagitis (3/9/2019)    Embolic stroke due to atrial fibrillation (H) (3/10/2019)    History of embolic stroke with hemorrhagic conversion 10/15/2018. (3/10/2019)      Follow-ups Needed After Discharge   --Neurology within 1 month.    Unresulted Labs Ordered in the Past 30 Days of this Admission     No orders found from 1/8/2019 to 3/10/2019.      These results will be followed up by PCP     Discharge Disposition   Discharged to long-term care facility  Condition at discharge: Stable    Hospital Course         Citlalli Villarreal is a 73 year old female who presented on 3/9/2019 with confusion and speech change due to presumed new embolic stroke    Expressive aphasia - Patient noted by staff at [facility] to have change in her baseline cognitive function, including speech changes (\"garbled speech\"), troubles walking and trouble following directions which were new as of the day of admission.  CT head 3/9/19 showed chronic ischemic changes, but no acute, change, and importantly, no evidence of hemorrhage (see below).  Neuro exam is notable only for an expressive aphasia.  She word searches, often comes up with a word not pertinent, and repeats a single word once she says it.  She is capable of forming full, coherent sentences however.  Her speech is clear, not slurred; I find no dysarthria.  She has no motor weakness anywhere " "else, no facial asymmetry.  She appears to have sustained an Embolic stroke due to atrial fibrillation (H), not on anticoagulation; see below.  There is no contraindication found to starting anticoagulation at this point.  Her [id number] score is 9.7%, her HASBLED score is 9.1%.  She tolerated 48 hours of heparin drip with no signs of hemorrhagic transformation of her stroke.  This was confirmed with CT.  Long discussion with daughter Susan about the risks and benefits of anticoagulation.  This was also discussed with her primary care provider, her cardiologist, and her neurosurgery team.  Cardiology recommended against starting anticoagulation, PCP and neurosurgery team were in favor of starting anticoagulation.  -Given that this is her second apparent embolic stroke, feel that the benefits of anticoagulation outweigh the risk.  Start Eliquis 5 mg twice daily.  - Decrease aspirin to 81 mg   - Bedside swallow - passed       History of embolic stroke with hemorrhagic conversion 10/15/2018 - admitted to [facility] with evidence of stroke, believed to be embolic, but then with hemorrhagic conversion.  Due to hemorrhage, anticoagulation was not started, and Neurosurgery follow-up was advised to follow-up on the hemorrhage and to get advice on when anticoagulation could be restarted.  Was seen in Neurosurgery follow-up 10/31/18 by Kim Melissa CNP.  CT head done that visit, showed \"near complete resolution\" of hemorrhage.  Note suggests that clopidogrel be added at that time, and event monitor recommended.  Per note, if a-fib seen on event monitor, \"I would recommend Eliquis\".  Patient obviously has persistent a-fib, and now has clinical evidence of another embolic stroke.  There is no evidence of hemorrhage as above.  Therefore, I find no contraindication to starting systemic anticoagulation.  -Eliquis as above  -Continue prior to admission statin  - would continue ASA due to history of carotid artery " stenosis    Active Problems:    Atrial fibrillation with RVR (H) - outpatient rate control achieved with diltiazem  mg every day.  Previous Rx attempted with digoxin and amiodarone, but neither was continued, perhaps because they were ineffective.  She was hospitalized here within the last week for atrial fibrillation and rapid ventricular response.  Metoprolol was added to the patient's usual diltiazem, though metoprolol had to be discontinued due to bradycardia, rates sustained in the 50s and sometimes in the 40s, and as result she was discharged only on diltiazem  mg p.o. daily.  On arrival here, IV diltiazem was attempted, but resulted in hypotension.  Briefly needed amiodarone infusion.  She received 2 doses of metoprolol, but the morning of discharge she had bradycardia, so this was not continued on discharge  -Resume prior to admission diltiazem  - Starting systemic anticoagulation as above.      Elevated WBC: WBC on admission 13.7.  Afebrile, lungs sound clear, urinalysis looks normal and no other identifiable source of infection noted.  WBC this morning 8.6.  -Resolved.       HLD (hyperlipidemia): On atorvastatin as outpatient.  - Continue atorvastatin.       Cognitive Impairment, Dementia: SLUMs 11/30, OCA 11/30 per chart review.  Per son, Gildardo, patient has chronic issues with memory impairment and behavioral outburst.  He  was reportedly applying for guardianship but he stated 3/11 that he does not want to make any medical decisions for his mother.  PCP notes their relationship is strained for many years.  --Daughter Susan is willing to be her medical decision maker and legal guardian.      Current moderate episode of major depressive disorder, unspecified whether recurrent (H): No information regarding recent mood.  - Continue preadmission sertraline.       Other constipation: Continue PTA MiraLAX and Senokot       Gastroesophageal reflux disease with esophagitis: Continue PTA omeprazole      Consultations This Hospital Stay   PHARMACY TO DOSE HEPARIN  CARE TRANSITION RN/SW IP CONSULT  SPEECH LANGUAGE PATH ADULT IP CONSULT  PHYSICAL THERAPY ADULT IP CONSULT  OCCUPATIONAL THERAPY ADULT IP CONSULT    Code Status   Full Code    Time Spent on this Encounter   I, Melissa Monteson, personally saw the patient today and spent greater than 30 minutes discharging this patient.       Melissa Glass, DO  [facility]  ______________________________________________________________________    Physical Exam   Vital Signs: Temp: 97.7  F (36.5  C) Temp src: Oral BP: 105/85 Pulse: (!) 46 Heart Rate: 52 Resp: (!) 5 SpO2: 94 % O2 Device: None (Room air)    Weight: 129 lbs 13.62 oz    Constitutional: awake, alert, cooperative, no apparent distress, and appears stated age  ENT: Normocephalic, without obvious abnormality, atraumatic, oral pharynx with moist mucous membranes, tonsils without erythema or exudates.  Upper dentures in place, missing lower dentures  Respiratory: No increased work of breathing, good air exchange, clear to auscultation bilaterally, no crackles or wheezing  Cardiovascular: Irreg irreg, regular rate, 2/6 systolic murmur heard at left sternal border  GI: No scars, normal bowel sounds, soft, non-distended, non-tender, no masses palpated, no hepatosplenomegally  Skin: no redness, warmth, or swelling  Musculoskeletal: motor strength is 5 out of 5 all extremities bilaterally  Neurologic: Alert, conversant, offers appropriate responses to my questions, though she word searches - improved from previous exam.  She is capable of forming full, coherent sentences however.  Her speech is clear, not slurred.  She has no motor weakness anywhere else, no facial asymmetry.   No gross motor or sensory deficits             Primary Care Physician   Natasha Diaz    Immunizations       Discharge Orders      General info for SNF    Length of Stay Estimate: Long Term Care  Condition at  Discharge: Improving  Level of care:skilled   Rehabilitation Potential: Fair  Admission H&P remains valid and up-to-date: Yes  Recent Chemotherapy: N/A  Use Nursing Home Standing Orders: Yes     Mantoux instructions    Give two-step Mantoux (PPD) Per Facility Policy Yes     Reason for your hospital stay    Presumed ischemic (blood clot) stroke and a-fib with rapid heart rate     Daily weights    Call Provider for weight gain of more than 2 pounds per day or 5 pounds per week.     Activity - Up ad tila     Physical Therapy Adult Consult    Evaluate and treat as clinically indicated.    Reason:  Recent stroke     Occupational Therapy Adult Consult    Evaluate and treat as clinically indicated.    Reason:  Recent stroke, dementia     Advance Diet as Tolerated    Follow this diet upon discharge: Orders Placed This Encounter      Regular Diet Adult       Significant Results and Procedures   Most Recent 3 CBC's:  Recent Labs   Lab Test 03/12/19  0608 03/10/19  0526 03/09/19  1615 03/06/19  0322   WBC  --  8.6 13.7* 11.8*   HGB  --  13.6 14.9 14.5   MCV  --  99 98 100    213 248 245     Most Recent 3 BMP's:  Recent Labs   Lab Test 03/11/19  2341 03/10/19  0526 03/09/19  1615 03/06/19  0322   NA  --  140 137 138   POTASSIUM  --  4.1 4.7 4.4   CHLORIDE  --  108 106 104   CO2  --  25 23 29   BUN  --  15 19 22   CR 1.06* 0.77 0.80 0.84   ANIONGAP  --  7 8 5   RODOLFO  --  8.6 9.7 9.2   GLC  --  102* 130* 112*       Discharge Medications   Current Discharge Medication List      START taking these medications    Details   apixaban ANTICOAGULANT (ELIQUIS) 5 MG tablet Take 1 tablet (5 mg) by mouth 2 times daily    Associated Diagnoses: Atrial fibrillation with RVR (H)         CONTINUE these medications which have CHANGED    Details   aspirin (ASA) 81 MG tablet Take 1 tablet (81 mg) by mouth daily  Refills: OTC    Associated Diagnoses: Carotid stenosis, asymptomatic, bilateral         CONTINUE these medications which have NOT  CHANGED    Details   acetaminophen (TYLENOL) 325 MG tablet Take 650 mg by mouth 3 times daily And every 8 hours PRN       atorvastatin (LIPITOR) 40 MG tablet Take 20 mg by mouth daily       cloNIDine (CATAPRES) 0.1 MG tablet TAKE 1 TAB BY MOUTH TWICE DAILY AS NEEDED FOR DIASTOLIC 100  Refills: 99      diltiazem ER (TIAZAC) 360 MG 24 hr ER beaded capsule Take 360 mg by mouth daily      gabapentin (NEURONTIN) 100 MG capsule Take 100 mg by mouth At Bedtime      MULTIPLE VITAMINS-MINERALS PO Take 1 tablet by mouth daily      omeprazole 20 MG tablet Take 20 mg by mouth daily      polyethylene glycol (MIRALAX/GLYCOLAX) packet Take 17 g by mouth daily      senna (SENOKOT) 8.6 MG tablet Take 1 tablet by mouth 2 times daily      sertraline (ZOLOFT) 25 MG tablet Take 25 mg by mouth daily           Allergies   No Known Allergies

## 2019-03-12 NOTE — PROGRESS NOTES
JUANJO GALLARDOG DISCHARGE NOTE    Patient discharged to [facility] at 5:[id number] PM via cart. Accompanied by daughter and staff. Discharge instructions reviewed with patient and daughter, opportunity offered to ask questions. Prescriptions - None ordered for discharge. All belongings sent with patient. Pt cont's to state comfort .  Jennifer Jacinto

## 2019-03-12 NOTE — PHARMACY-ANTICOAGULATION SERVICE
Heparin 10a(Xa) =     Lab Results   Component Value Date    AXA 0.20 03/12/2019       Goal level of: 0.15-0.35 IU/mL    Heparin instructions: Continue same heparin rate of 750 units/hr.    Recheck next heparin 10a(Xa): in the AM.    Heparin dosed per [place] Protocol. Monitor platelets every three days while on anticoagulation therapy.

## 2019-03-12 NOTE — PROGRESS NOTES
Pt up to bedside commode for a small bowel movement; writer notes an external hemroid that was not there on previously. Pt denies pain regarding this. Will monitor

## 2019-03-12 NOTE — PROGRESS NOTES
"Web paged Dr. Glass re: B/P left arm 119/108 and 162/107 w/ HR 75 amd rt. Arm B/P manual 128/92 w/ HR 72. Denies dizziness/lighthead and HA and pain. States comfort and \"I feel good\" and is smiling. Report to Ghazal, nurse at [place].  "

## 2019-03-12 NOTE — PROGRESS NOTES
Care Transitions Discharge:    Name: Citlalli Villarreal    MRN: [id number]    Reason for Hospitalization: Atrial fibrillation with RVR (H) [I48.91]  Dementia with behavioral disturbance, unspecified dementia type [F03.91]    Cognitive/Behavioral Status: awake and alert    Follow-up Appointments: No future appointments.    Discharge Date:  3/12/2019    Patient/Care Partner in agreement and understands the discharge plan:  Yes    Discharge Disposition:  Return to [facility] Phone (Main Phone:[phone] Admissions Phone:[phone] Fax: [phone]). Transportation will be provided by [facility].    Discharge Planner   Discharge Plans in progress: Return to [facility] today.  Barriers to discharge plan: None  Follow up plan: Follow up with PCP.       Entered by: Rosanne Combs 03/12/2019 1:22 PM         Rosanne Combs RN Care Coordinator  [phone]

## 2019-03-12 NOTE — PHARMACY - DISCHARGE MEDICATION RECONCILIATION
Discharge medication review for this patient is complete.   .     The following changes were made to the discharge medication list based on pharmacist review:  Added:  none  Discontinued: none  Changed: none      Patient's Discharge Medication List  - medications as listed on After Visit Summary (AVS)     Review of your medicines      START taking      Dose / Directions   apixaban ANTICOAGULANT 5 MG tablet  Commonly known as:  ELIQUIS      Dose:  5 mg  Take 1 tablet (5 mg) by mouth 2 times daily  Refills:  0        CONTINUE these medicines which may have CHANGED, or have new prescriptions. If we are uncertain of the size of tablets/capsules you have at home, strength may be listed as something that might have changed.      Dose / Directions   aspirin 81 MG tablet  Commonly known as:  ASA  This may have changed:  how much to take  Used for:  Carotid stenosis, asymptomatic, bilateral      Dose:  81 mg  Take 1 tablet (81 mg) by mouth daily  Refills:  OTC        CONTINUE these medicines which have NOT CHANGED      Dose / Directions   acetaminophen 325 MG tablet  Commonly known as:  TYLENOL      Dose:  650 mg  Take 650 mg by mouth 3 times daily And every 8 hours PRN  Refills:  0     atorvastatin 40 MG tablet  Commonly known as:  LIPITOR      Dose:  20 mg  Take 20 mg by mouth daily  Refills:  0     cloNIDine 0.1 MG tablet  Commonly known as:  CATAPRES      TAKE 1 TAB BY MOUTH TWICE DAILY AS NEEDED FOR DIASTOLIC 100  Refills:  99     diltiazem  MG 24 hr ER beaded capsule  Commonly known as:  TIAZAC      Dose:  360 mg  Take 360 mg by mouth daily  Refills:  0     gabapentin 100 MG capsule  Commonly known as:  NEURONTIN      Dose:  100 mg  Take 100 mg by mouth At Bedtime  Refills:  0     MULTIPLE VITAMINS-MINERALS PO      Dose:  1 tablet  Take 1 tablet by mouth daily  Refills:  0     omeprazole 20 MG tablet      Dose:  20 mg  Take 20 mg by mouth daily  Refills:  0     polyethylene glycol packet  Commonly known as:   MIRALAX/GLYCOLAX      Dose:  17 g  Take 17 g by mouth daily  Refills:  0     senna 8.6 MG tablet  Commonly known as:  SENOKOT      Dose:  1 tablet  Take 1 tablet by mouth 2 times daily  Refills:  0     sertraline 25 MG tablet  Commonly known as:  ZOLOFT      Dose:  25 mg  Take 25 mg by mouth daily  Refills:  0

## 2019-03-13 NOTE — PLAN OF CARE
Munira Bach here with pt and states pt is back to her baseline. Is able to make needs known and is oriented to person and place. Cont's to have ocassional difficulty with wordfinding. Is able to abulate with SBA and is a little unsteady. Nurse at [facility] stated pt is a little unsteady walking per her usual.

## 2019-03-19 NOTE — LETTER
3/19/2019        RE: Citlalli Villarreal  [facility]  [address] [facility] [address]        [place] [facility]  PRIMARY CARE PROVIDER AND CLINIC:  JADA Us CNP, [address] / [place] [address]  Chief Complaint   Patient presents with     Hospital F/U     [facility] Medical Record Number:  [id number]  Place of Service where encounter took place:  [facility]  ([facility]) [[id number]]    Citlalli Villarreal  is a 73 year old  (1945), admitted to the above facility from  [facility]. Hospital stay 3/10/19 through 3/12/19..  Admitted to this facility for  rehab, medical management and nursing care.    HPI:    HPI information obtained from: facility chart records, facility staff, [facility] chart review and Care Everywhere [facility] chart review.   Brief Summary of Hospital Course: Citlalli has been hospitalized x 2 this month, from 3/6-7 with chest pain thought due to afib with RVR, and 3/10-12 with embolic stroke with speech changes, trouble walking, trouble following directions.  CT head on 3//9/19 showed chronic ischemic changes, no hemorrhagic conversion of new L occipital lobe infarct, and multiple old infarcts.  [id number] score is 9.7%, HASBLED score is 9.1%.  Started on apixaban after discussion with cardiology, PCP, neurosurgery, family.  She was returned to LTC facility.   Updates on Status Since Skilled nursing Admission: Citlalli reports she feels OK today.  She has new difficulty with speech and word finding.  She is often able to speak sentences, but today words are somewhat garbled. Her family is reluctant to help her make medical decisions due to strained relationships in the past.  Apparently, guardianship is being pursued by the LTC facility, [facility]. Her BP/P have been difficult to control, per nursing report.     CODE STATUS/ADVANCE DIRECTIVES DISCUSSION:   DNR/DNI    Patient's living condition: lives in a skilled nursing  facility  ALLERGIES: Patient has no known allergies.  PAST MEDICAL HISTORY:  has a past medical history of Atrial fibrillation (H), Cerebral infarction (H), Gastroesophageal reflux disease, Gastrointestinal hemorrhage, History of embolic stroke with hemorrhagic conversion 10/15/2018. (3/10/2019), Hyperlipemia, Major depressive disorder, Mild cognitive impairment, so stated, Neurologic neglect syndrome, Neuromuscular dysfunction of bladder, unspecified, Nontraumatic intracranial hemorrhage (H), Occlusion and stenosis of bilateral carotid arteries, Radial fracture, Repeated falls, and Right wrist fracture (10/2018). She also has no past medical history of Thyroid disease.  PAST SURGICAL HISTORY:   has a past surgical history that includes Forearm surgery (10/2018).  FAMILY HISTORY: Family history is unknown by patient.  SOCIAL HISTORY:   has an unknown smoking status. she has never used smokeless tobacco. She reports that she does not drink alcohol or use drugs.    Post Discharge Medication Reconciliation Status: discharge medications reconciled, continue medications without change  Current Outpatient Medications   Medication Sig Dispense Refill     acetaminophen (TYLENOL) 325 MG tablet Take 650 mg by mouth 3 times daily And every 8 hours PRN        apixaban ANTICOAGULANT (ELIQUIS) 5 MG tablet Take 1 tablet (5 mg) by mouth 2 times daily       aspirin (ASA) 81 MG tablet Take 1 tablet (81 mg) by mouth daily  OTC     atorvastatin (LIPITOR) 40 MG tablet Take 20 mg by mouth daily        cloNIDine (CATAPRES) 0.1 MG tablet TAKE 1 TAB BY MOUTH TWICE DAILY AS NEEDED FOR DIASTOLIC 100  99     diltiazem ER (TIAZAC) 360 MG 24 hr ER beaded capsule Take 360 mg by mouth daily       gabapentin (NEURONTIN) 100 MG capsule Take 100 mg by mouth At Bedtime       MULTIPLE VITAMINS-MINERALS PO Take 1 tablet by mouth daily       omeprazole 20 MG tablet Take 20 mg by mouth daily       polyethylene glycol (MIRALAX/GLYCOLAX) packet Take 17 g by  mouth daily       senna (SENOKOT) 8.6 MG tablet Take 1 tablet by mouth 2 times daily       sertraline (ZOLOFT) 25 MG tablet Take 25 mg by mouth daily       ROS:  Limited secondary to aphasia impairment but today pt reports no new concerns.     Vitals:  BP (!) 159/112   Pulse 79   Temp 96.4  F (35.8  C)   Resp 16   Wt 54.9 kg (121 lb)   SpO2 97%   BMI 22.13 kg/m     Exam:  GENERAL APPEARANCE:  Alert, in no distress , difficulty with speech  HEAD:  Normal, normocephalic, atraumatic  EYE EXAM: normal external eye, conjunctiva, lids, SHERICE  NECK EXAM: supple, no JVD  CHEST/RESP:  respiratory effort normal, lung sounds CTA , no respiratory distress  CV:  Rate tachycardic, rhythm irregular, no murmur, no peripheral edema   M/S:   extremities normal, gait normal-unsteady, normal muscle tone, and range of motion normal   SKIN EXAM: CDI  NEUROLOGIC EXAM: Normal gross motor movement, tone and coordination. No tremor. Cranial nerves 2-12 are normal tested and grossly at patient's baseline  PSYCH:  Alert and unable to determine orientation due to aphasia     Lab/Diagnostic data:    Most Recent 3 CBC's:  Recent Labs   Lab Test 03/12/19  0608 03/10/19  0526 03/09/19  1615 03/06/19  0322   WBC  --  8.6 13.7* 11.8*   HGB  --  13.6 14.9 14.5   MCV  --  99 98 100    213 248 245     Most Recent 3 BMP's:  Recent Labs   Lab Test 03/11/19  2341 03/10/19  0526 03/09/19  1615 03/06/19  0322   NA  --  140 137 138   POTASSIUM  --  4.1 4.7 4.4   CHLORIDE  --  108 106 104   CO2  --  25 23 29   BUN  --  15 19 22   CR 1.06* 0.77 0.80 0.84   ANIONGAP  --  7 8 5   RODOLFO  --  8.6 9.7 9.2   GLC  --  102* 130* 112*     Most Recent 2 LFT's:  Recent Labs   Lab Test 03/09/19  1615 03/06/19  0322   AST 20 43   ALT 33 47   ALKPHOS 115 109   BILITOTAL 0.6 0.4     Most Recent 3 BNP's:  Recent Labs   Lab Test 03/06/19  0322   NTBNPI 2,976*     Most Recent Cholesterol Panel:No lab results found.  Most Recent TSH and T4:  Recent Labs   Lab Test  "03/06/19  0322   TSH 2.84       ASSESSMENT/PLAN:  Current [place] scheduled appointments:   none    Cerebrovascular accident (CVA) due to embolism of left anterior cerebral artery (H)  History of embolic stroke with hemorrhagic conversion 10/15/2018.  Patient with new embolic stroke in the setting of previously CVA with hemorrhagic conversion and CT head evidence of multiple old infarcts.  On statin. Noted Carotid artery stenosis as well, on aspirin.  [id number] 9.7% and HASBLED 9.1% per review of hospital records.  There is limited family involvement to make decisions regarding this difficult situation.  She is at a high risk for recurrent strokes, and she was started on apixaban.  Will need close follow up and ongoing evaluation of risks versus benefits of anticoagulation.  She is at risk of falls, due to her memory impairment.     Chronic atrial fibrillation (H)  Long term current use of anticoagulant therapy  HR irreg and tachy today, ranging  per review of records at [facility].  Started on apixaban.  On diltiazem. Monitor for bleeding, falls.     Cognitive impairment  Worsening with aphasia limiting her ability to express herself and this appears to cause frustration at times.  Maintain safe living situation in LTC facility     Hypertensive Heart disease without heart failure  Noted elevated BP, trending high and today 163/93. Diastolic often noted over 100.  On clonidine prn, and using BID.  Will add very low dose metoprolol, though we will need to watch for bradycardia as this did occur in the hospital.        ACP/Family concerns :  Son, Jose Armando, in discussion with Citlalli in the past, has made a decision for DNR/DNI and this is reasonable and in line with Citlalli's discussions with me in the past.  She has no desire to live in LTC facility nor be dependent on anyone.  She had verbalized to me in the past that \"I'd be better off dead, than live here forever\".  It is reasonable and appropriate to continue " with DNR/I. New directives form to be scanned to [facility].  Changed to DNR/DNI .     Sons and daughter are not interested in becoming guardians/health care decision makers for Citlalli.  Longstanding strained family relationships.  Apparently, Agueda HOUSER is pursuing guardianship.      transcribed by : Bubba Diaz  1. metoprolol tartrate 12.5 mg PO BID Hold if apical pulse <60 Dx: HTN      Electronically signed by:  JADA Us CNP         Sincerely,        JADA Us CNP

## 2019-03-19 NOTE — PROGRESS NOTES
[facility]  PRIMARY CARE PROVIDER AND CLINIC:  JADA Us CNP, [address] / [place] [address]  Chief Complaint   Patient presents with     Hospital F/U     [facility] Medical Record Number:  [id number]  Place of Service where encounter took place:  [facility]  ([facility]GS) [[id number]]    Citlalli Villarreal  is a 73 year old  (1945), admitted to the above facility from  [facility]. Hospital stay 3/10/19 through 3/12/19..  Admitted to this facility for  rehab, medical management and nursing care.    HPI:    HPI information obtained from: facility chart records, facility staff, [facility] chart review and Care Everywhere [facility] chart review.   Brief Summary of Hospital Course: Citlalli has been hospitalized x 2 this month, from 3/6-7 with chest pain thought due to afib with RVR, and 3/10-12 with embolic stroke with speech changes, trouble walking, trouble following directions.  CT head on 3//9/19 showed chronic ischemic changes, no hemorrhagic conversion of new L occipital lobe infarct, and multiple old infarcts.  [id number] score is 9.7%, HASBLED score is 9.1%.  Started on apixaban after discussion with cardiology, PCP, neurosurgery, family.  She was returned to LTC facility.   Updates on Status Since Skilled nursing Admission: Citlalli reports she feels OK today.  She has new difficulty with speech and word finding.  She is often able to speak sentences, but today words are somewhat garbled. Her family is reluctant to help her make medical decisions due to strained relationships in the past.  Apparently, guardianship is being pursued by the LTC facility, [facility]. Her BP/P have been difficult to control, per nursing report.     CODE STATUS/ADVANCE DIRECTIVES DISCUSSION:   DNR/DNI    Patient's living condition: lives in a skilled nursing facility  ALLERGIES: Patient has no known allergies.  PAST MEDICAL HISTORY:  has a past medical history of Atrial  fibrillation (H), Cerebral infarction (H), Gastroesophageal reflux disease, Gastrointestinal hemorrhage, History of embolic stroke with hemorrhagic conversion 10/15/2018. (3/10/2019), Hyperlipemia, Major depressive disorder, Mild cognitive impairment, so stated, Neurologic neglect syndrome, Neuromuscular dysfunction of bladder, unspecified, Nontraumatic intracranial hemorrhage (H), Occlusion and stenosis of bilateral carotid arteries, Radial fracture, Repeated falls, and Right wrist fracture (10/2018). She also has no past medical history of Thyroid disease.  PAST SURGICAL HISTORY:   has a past surgical history that includes Forearm surgery (10/2018).  FAMILY HISTORY: Family history is unknown by patient.  SOCIAL HISTORY:   has an unknown smoking status. she has never used smokeless tobacco. She reports that she does not drink alcohol or use drugs.    Post Discharge Medication Reconciliation Status: discharge medications reconciled, continue medications without change  Current Outpatient Medications   Medication Sig Dispense Refill     acetaminophen (TYLENOL) 325 MG tablet Take 650 mg by mouth 3 times daily And every 8 hours PRN        apixaban ANTICOAGULANT (ELIQUIS) 5 MG tablet Take 1 tablet (5 mg) by mouth 2 times daily       aspirin (ASA) 81 MG tablet Take 1 tablet (81 mg) by mouth daily  OTC     atorvastatin (LIPITOR) 40 MG tablet Take 20 mg by mouth daily        cloNIDine (CATAPRES) 0.1 MG tablet TAKE 1 TAB BY MOUTH TWICE DAILY AS NEEDED FOR DIASTOLIC 100  99     diltiazem ER (TIAZAC) 360 MG 24 hr ER beaded capsule Take 360 mg by mouth daily       gabapentin (NEURONTIN) 100 MG capsule Take 100 mg by mouth At Bedtime       MULTIPLE VITAMINS-MINERALS PO Take 1 tablet by mouth daily       omeprazole 20 MG tablet Take 20 mg by mouth daily       polyethylene glycol (MIRALAX/GLYCOLAX) packet Take 17 g by mouth daily       senna (SENOKOT) 8.6 MG tablet Take 1 tablet by mouth 2 times daily       sertraline (ZOLOFT) 25  MG tablet Take 25 mg by mouth daily       ROS:  Limited secondary to aphasia impairment but today pt reports no new concerns.     Vitals:  BP (!) 159/112   Pulse 79   Temp 96.4  F (35.8  C)   Resp 16   Wt 54.9 kg (121 lb)   SpO2 97%   BMI 22.13 kg/m    Exam:  GENERAL APPEARANCE:  Alert, in no distress , difficulty with speech  HEAD:  Normal, normocephalic, atraumatic  EYE EXAM: normal external eye, conjunctiva, lids, SHERICE  NECK EXAM: supple, no JVD  CHEST/RESP:  respiratory effort normal, lung sounds CTA , no respiratory distress  CV:  Rate tachycardic, rhythm irregular, no murmur, no peripheral edema   M/S:   extremities normal, gait normal-unsteady, normal muscle tone, and range of motion normal   SKIN EXAM: CDI  NEUROLOGIC EXAM: Normal gross motor movement, tone and coordination. No tremor. Cranial nerves 2-12 are normal tested and grossly at patient's baseline  PSYCH:  Alert and unable to determine orientation due to aphasia     Lab/Diagnostic data:    Most Recent 3 CBC's:  Recent Labs   Lab Test 03/12/19  0608 03/10/19  0526 03/09/19  1615 03/06/19  0322   WBC  --  8.6 13.7* 11.8*   HGB  --  13.6 14.9 14.5   MCV  --  99 98 100    213 248 245     Most Recent 3 BMP's:  Recent Labs   Lab Test 03/11/19  2341 03/10/19  0526 03/09/19  1615 03/06/19  0322   NA  --  140 137 138   POTASSIUM  --  4.1 4.7 4.4   CHLORIDE  --  108 106 104   CO2  --  25 23 29   BUN  --  15 19 22   CR 1.06* 0.77 0.80 0.84   ANIONGAP  --  7 8 5   RODOLFO  --  8.6 9.7 9.2   GLC  --  102* 130* 112*     Most Recent 2 LFT's:  Recent Labs   Lab Test 03/09/19  1615 03/06/19  0322   AST 20 43   ALT 33 47   ALKPHOS 115 109   BILITOTAL 0.6 0.4     Most Recent 3 BNP's:  Recent Labs   Lab Test 03/06/19  0322   NTBNPI 2,976*     Most Recent Cholesterol Panel:No lab results found.  Most Recent TSH and T4:  Recent Labs   Lab Test 03/06/19  0322   TSH 2.84       ASSESSMENT/PLAN:  Current [place] scheduled appointments:   none    Cerebrovascular  "accident (CVA) due to embolism of left anterior cerebral artery (H)  History of embolic stroke with hemorrhagic conversion 10/15/2018.  Patient with new embolic stroke in the setting of previously CVA with hemorrhagic conversion and CT head evidence of multiple old infarcts.  On statin. Noted Carotid artery stenosis as well, on aspirin.  [id number] 9.7% and HASBLED 9.1% per review of hospital records.  There is limited family involvement to make decisions regarding this difficult situation.  She is at a high risk for recurrent strokes, and she was started on apixaban.  Will need close follow up and ongoing evaluation of risks versus benefits of anticoagulation.  She is at risk of falls, due to her memory impairment.     Chronic atrial fibrillation (H)  Long term current use of anticoagulant therapy  HR irreg and tachy today, ranging  per review of records at [facility].  Started on apixaban.  On diltiazem. Monitor for bleeding, falls.     Cognitive impairment  Worsening with aphasia limiting her ability to express herself and this appears to cause frustration at times.  Maintain safe living situation in LTC facility     Hypertensive Heart disease without heart failure  Noted elevated BP, trending high and today 163/93. Diastolic often noted over 100.  On clonidine prn, and using BID.  Will add very low dose metoprolol, though we will need to watch for bradycardia as this did occur in the hospital.        ACP/Family concerns :  SonJose Armando, in discussion with Citlalli in the past, has made a decision for DNR/DNI and this is reasonable and in line with Citlalli's discussions with me in the past.  She has no desire to live in LTC facility nor be dependent on anyone.  She had verbalized to me in the past that \"I'd be better off dead, than live here forever\".  It is reasonable and appropriate to continue with DNR/I. New directives form to be scanned to [facility].  Changed to DNR/DNI .     Sons and daughter are not " interested in becoming guardians/health care decision makers for Citlalli.  Longstanding strained family relationships.  Apparently, Agueda HOUSER is pursuing guardianship.      transcribed by : Bubba Diaz  1. metoprolol tartrate 12.5 mg PO BID Hold if apical pulse <60 Dx: HTN      Electronically signed by:  JADA Us CNP

## 2019-03-22 PROBLEM — Z79.01 LONG TERM CURRENT USE OF ANTICOAGULANT THERAPY: Status: ACTIVE | Noted: 2019-01-01

## 2019-03-22 PROBLEM — I65.23 CAROTID STENOSIS, BILATERAL: Status: ACTIVE | Noted: 2019-01-01

## 2019-03-22 PROBLEM — I48.20 CHRONIC ATRIAL FIBRILLATION (H): Status: ACTIVE | Noted: 2019-01-01

## 2019-03-22 PROBLEM — I11.9 HYPERTENSIVE HEART DISEASE WITHOUT HEART FAILURE: Status: ACTIVE | Noted: 2019-01-01

## 2019-03-22 PROBLEM — I50.9 CHF (CONGESTIVE HEART FAILURE) (H): Status: ACTIVE | Noted: 2019-01-01

## 2019-03-26 NOTE — LETTER
3/26/2019        RE: Citlalli Villarreal  [facility]  [address] [place] [address]        [facility]  [facility] Medical Record Number:  [id number]  Place of Service where encounter took place:  [facility]  ([facility]) [[id number]]  Chief Complaint   Patient presents with     Nursing Home Acute       HPI:    Citlalli Villarreal  is a 73 year old (1945), who is being seen today for an episodic care visit.  HPI information obtained from: facility chart records and facility staff. Today's concern is:     Hypertensive heart disease without heart failure  Cerebrovascular accident (CVA) due to embolism of left anterior cerebral artery (H)  Chronic atrial fibrillation (H)  Long term current use of anticoagulant therapy  Dementia without behavioral disturbance, unspecified dementia type   Ongoing concerns with dementia, weight loss, she has been observed to be flushing her food down the toilet.  She continues to have elevated BP/P without apparent symptoms of dizziness or chest pain though she is a very poor historian and has aphasia limiting her ability to communicate.     Past Medical and Surgical History reviewed in Epic today.    MEDICATIONS:  Current Outpatient Medications   Medication Sig Dispense Refill     acetaminophen (TYLENOL) 325 MG tablet Take 650 mg by mouth every 8 hours as needed        apixaban ANTICOAGULANT (ELIQUIS) 5 MG tablet Take 1 tablet (5 mg) by mouth 2 times daily       aspirin (ASA) 81 MG tablet Take 1 tablet (81 mg) by mouth daily  OTC     atorvastatin (LIPITOR) 40 MG tablet Take 20 mg by mouth daily        cloNIDine (CATAPRES) 0.1 MG tablet TAKE 1 TAB BY MOUTH TWICE DAILY AS NEEDED FOR DIASTOLIC 100  99     diltiazem ER (TIAZAC) 360 MG 24 hr ER beaded capsule Take 360 mg by mouth daily       gabapentin (NEURONTIN) 100 MG capsule Take 100 mg by mouth At Bedtime       MULTIPLE VITAMINS-MINERALS PO Take 1 tablet by mouth daily       omeprazole 20 MG  "tablet Take 20 mg by mouth daily       polyethylene glycol (MIRALAX/GLYCOLAX) packet Take 17 g by mouth daily       senna (SENOKOT) 8.6 MG tablet Take 1 tablet by mouth 2 times daily       sertraline (ZOLOFT) 25 MG tablet Take 50 mg by mouth daily        REVIEW OF SYSTEMS:  Limited secondary to aphasia impairment but today pt reports no pain, no concerns.     Objective:  BP (!) 145/108   Pulse 130   Temp 97.1  F (36.2  C)   Resp 16   Ht 1.702 m (5' 7\")   Wt 53 kg (116 lb 12.8 oz)   SpO2 95%   BMI 18.29 kg/m     Exam:  GENERAL APPEARANCE:  Alert, in no distress   HEAD:  Normal, normocephalic, atraumatic  EYE EXAM: normal external eye, conjunctiva, lids, SHERICE  NECK EXAM: supple, no JVD  CHEST/RESP:  respiratory effort normal, lung sounds CTA , no respiratory distress  CV:  Rate tachy, rhythm irreg, no murmur, no peripheral edema   M/S:   extremities normal, gait normal-with poor balance, no devices, normal muscle tone, and range of motion normal   NEUROLOGIC EXAM: Normal gross motor movement, tone and coordination. No tremor. Cranial nerves 2-12 are normal tested and grossly at patient's baseline  PSYCH:  Alert and unable to determine orientation due to cognitive impairment , affect anxious, judgement impaired by cognitive losses        Labs:   Recent labs in [facility] reviewed by me today.     ASSESSMENT/PLAN:  Hypertensive heart disease without heart failure  Continues to be hypertensive with BP trending 116-160/, pulse trending  upon review of facility records.  She has tolerated low dose metoprolol without bottoming out her BP, so will increase to 25 mg BID, continuing to hold if P trends low.      Cerebrovascular accident (CVA) due to embolism of left anterior cerebral artery (H)  No new symptoms, continues with expressive aphasia which is frustrating for her.  Stable.     Chronic atrial fibrillation (H)  Long term current use of anticoagulant therapy  On eliquis, no new concerns.  Continues with " irregular rate.     Dementia without behavioral disturbance, unspecified dementia type  Dementia stable.  She was found to be flushing her food down the toilet or clogging the sink with is, consequently had lost several pounds.  She is now coming to the dining room to eat and weight appears stabilized in the recent days.  Continue to monitor.       transcribed by : Bubba Diaz  1. discontinue Metoprolol 12.5 BID  2. Metoprolol 25 mg PO BID Dx:: HTN. Hold for HR <60    Electronically signed by:  JADA Us CNP           Sincerely,        JADA Us CNP

## 2019-03-26 NOTE — PROGRESS NOTES
[place] [facility]  [place] [facility] Record Number:  [id number]  Place of Service where encounter took place:  [facility]  ([facility]) [[id number]]  Chief Complaint   Patient presents with     Nursing Home Acute       HPI:    Ciltalli Villarreal  is a 73 year old (1945), who is being seen today for an episodic care visit.  HPI information obtained from: facility chart records and facility staff. Today's concern is:     Hypertensive heart disease without heart failure  Cerebrovascular accident (CVA) due to embolism of left anterior cerebral artery (H)  Chronic atrial fibrillation (H)  Long term current use of anticoagulant therapy  Dementia without behavioral disturbance, unspecified dementia type   Ongoing concerns with dementia, weight loss, she has been observed to be flushing her food down the toilet.  She continues to have elevated BP/P without apparent symptoms of dizziness or chest pain though she is a very poor historian and has aphasia limiting her ability to communicate.     Past Medical and Surgical History reviewed in Epic today.    MEDICATIONS:  Current Outpatient Medications   Medication Sig Dispense Refill     acetaminophen (TYLENOL) 325 MG tablet Take 650 mg by mouth every 8 hours as needed        apixaban ANTICOAGULANT (ELIQUIS) 5 MG tablet Take 1 tablet (5 mg) by mouth 2 times daily       aspirin (ASA) 81 MG tablet Take 1 tablet (81 mg) by mouth daily  OTC     atorvastatin (LIPITOR) 40 MG tablet Take 20 mg by mouth daily        cloNIDine (CATAPRES) 0.1 MG tablet TAKE 1 TAB BY MOUTH TWICE DAILY AS NEEDED FOR DIASTOLIC 100  99     diltiazem ER (TIAZAC) 360 MG 24 hr ER beaded capsule Take 360 mg by mouth daily       gabapentin (NEURONTIN) 100 MG capsule Take 100 mg by mouth At Bedtime       MULTIPLE VITAMINS-MINERALS PO Take 1 tablet by mouth daily       omeprazole 20 MG tablet Take 20 mg by mouth daily       polyethylene glycol (MIRALAX/GLYCOLAX) packet Take 17 g by mouth daily        "senna (SENOKOT) 8.6 MG tablet Take 1 tablet by mouth 2 times daily       sertraline (ZOLOFT) 25 MG tablet Take 50 mg by mouth daily        REVIEW OF SYSTEMS:  Limited secondary to aphasia impairment but today pt reports no pain, no concerns.     Objective:  BP (!) 145/108   Pulse 130   Temp 97.1  F (36.2  C)   Resp 16   Ht 1.702 m (5' 7\")   Wt 53 kg (116 lb 12.8 oz)   SpO2 95%   BMI 18.29 kg/m    Exam:  GENERAL APPEARANCE:  Alert, in no distress   HEAD:  Normal, normocephalic, atraumatic  EYE EXAM: normal external eye, conjunctiva, lids, SHERICE  NECK EXAM: supple, no JVD  CHEST/RESP:  respiratory effort normal, lung sounds CTA , no respiratory distress  CV:  Rate tachy, rhythm irreg, no murmur, no peripheral edema   M/S:   extremities normal, gait normal-with poor balance, no devices, normal muscle tone, and range of motion normal   NEUROLOGIC EXAM: Normal gross motor movement, tone and coordination. No tremor. Cranial nerves 2-12 are normal tested and grossly at patient's baseline  PSYCH:  Alert and unable to determine orientation due to cognitive impairment , affect anxious, judgement impaired by cognitive losses        Labs:   Recent labs in [place] reviewed by me today.     ASSESSMENT/PLAN:  Hypertensive heart disease without heart failure  Continues to be hypertensive with BP trending 116-160/, pulse trending  upon review of facility records.  She has tolerated low dose metoprolol without bottoming out her BP, so will increase to 25 mg BID, continuing to hold if P trends low.      Cerebrovascular accident (CVA) due to embolism of left anterior cerebral artery (H)  No new symptoms, continues with expressive aphasia which is frustrating for her.  Stable.     Chronic atrial fibrillation (H)  Long term current use of anticoagulant therapy  On eliquis, no new concerns.  Continues with irregular rate.     Dementia without behavioral disturbance, unspecified dementia type  Dementia stable.  She was found " to be flushing her food down the toilet or clogging the sink with is, consequently had lost several pounds.  She is now coming to the dining room to eat and weight appears stabilized in the recent days.  Continue to monitor.       transcribed by : Bubba Diaz  1. discontinue Metoprolol 12.5 BID  2. Metoprolol 25 mg PO BID Dx:: HTN. Hold for HR <60    Electronically signed by:  JADA Us CNP

## 2019-03-31 NOTE — TELEPHONE ENCOUNTER
Patient with tachycardia HR 120s and HTN  despite PRN clonidine given. Also takes metoprolol 25 mg PO BID - took AM dose    PLAN  Give 25 mg Metoprolol extra dose now. Monitor vss. Update NP in one hour if still tachycardic or BP elevated.     Electronically signed by JADA Metcalf GNP

## 2019-04-02 NOTE — LETTER
4/2/2019        RE: Citlalli Villarreal  [facility]  [address] [facility] [address]        [place] [facility]  Chief Complaint   Patient presents with     Annual Comprehensive Nursing Home     [place] [facility] Record Number:  [id number]  Place of Service where encounter took place:  [facility]  ([facility]) [[id number]]    HPI:    Citlalli Villarreal  is a 73 year old  (1945), who is being seen today for an annual comprehensive visit. HPI information obtained from: facility chart records, facility staff and [facility] chart review.  Today's concerns are:     Cerebrovascular accident (CVA) due to embolism of left anterior cerebral artery (H)  Expressive aphasia  Chronic atrial fibrillation (H)  Long term current use of anticoagulant therapy  Dementia without behavioral disturbance, unspecified dementia type  Essential hypertension  Pain   Citlalli Villarreal has had a complicated last several months, now with new CVA with residual expressive aphasia limiting her ability to communicate.  She also has chronic afib,now on anticoagulation. Her BP has been difficult to control, on clonidine and very gradual increase in metoprolol .  She has lost weight, eating poorly and was found to be flushing her food down the toilet.       ALLERGIES: Patient has no known allergies.  PAST MEDICAL HISTORY:  has a past medical history of Atrial fibrillation (H), Cerebral infarction (H), Gastroesophageal reflux disease, Gastrointestinal hemorrhage, History of embolic stroke with hemorrhagic conversion 10/15/2018. (3/10/2019), Hyperlipemia, Major depressive disorder, Mild cognitive impairment, so stated, Neurologic neglect syndrome, Neuromuscular dysfunction of bladder, unspecified, Nontraumatic intracranial hemorrhage (H), Occlusion and stenosis of bilateral carotid arteries, Radial fracture, Repeated falls, and Right wrist fracture (10/2018). She also has no past medical history of Thyroid  disease.  PAST SURGICAL HISTORY:  has a past surgical history that includes Forearm surgery (10/2018).  IMMUNIZATIONS:  Immunization History   Administered Date(s) Administered     Flu, Unspecified 10/02/2018     Pneumo Conj 13-V (2010&after) 11/23/2018     Tdap (Adult) Unspecified Formulation 11/23/2018     Above immunizations pulled from [facility]. [facility] and facility records also reconciled. Outstanding information sent to  to update [facility] .  Future immunizations needed:  PPSV23 and should get this after 11/23/19    Current Outpatient Medications   Medication Sig Dispense Refill     acetaminophen (TYLENOL) 325 MG tablet Take 650 mg by mouth every 8 hours as needed        apixaban ANTICOAGULANT (ELIQUIS) 5 MG tablet Take 1 tablet (5 mg) by mouth 2 times daily       aspirin (ASA) 81 MG tablet Take 1 tablet (81 mg) by mouth daily  OTC     atorvastatin (LIPITOR) 40 MG tablet Take 20 mg by mouth daily        cloNIDine (CATAPRES) 0.1 MG tablet TAKE 1 TAB BY MOUTH TWICE DAILY AS NEEDED FOR DIASTOLIC 100  99     diltiazem ER (TIAZAC) 360 MG 24 hr ER beaded capsule Take 360 mg by mouth daily       gabapentin (NEURONTIN) 100 MG capsule Take 100 mg by mouth At Bedtime       metoprolol tartrate (LOPRESSOR) 25 MG tablet Take 1 tablet (25 mg) by mouth 2 times daily       MULTIPLE VITAMINS-MINERALS PO Take 1 tablet by mouth daily       omeprazole 20 MG tablet Take 20 mg by mouth daily       polyethylene glycol (MIRALAX/GLYCOLAX) packet Take 17 g by mouth daily       senna (SENOKOT) 8.6 MG tablet Take 1 tablet by mouth 2 times daily       sertraline (ZOLOFT) 25 MG tablet Take 50 mg by mouth daily          Case Management:  I have reviewed the facility/SNF care plan/MDS, including the falls risk, nutrition and pain screening. I also reviewed the current immunizations, and preventive care. .Future cancer screening is not clinically indicated secondary to age/goals of care Patient's desire to return to the  community is not assessible due to cognitive impairment. Current Level of Care is appropriate.    Advance Directive Discussion:    I reviewed the current advanced directives as reflected in EPIC, the POLST and the facility chart, and verified the congruency of orders . I contacted the first party and discussed the plan of Care.  I did not due to cognitive impairment review the advance directives with the resident.     Team Discussion:  I communicated with the appropriate disciplines involved with the Plan of Care:   Nursing    Patient's goal is pain control and comfort.  Information reviewed:  Medications, vital signs, orders, and nursing notes.    ROS:  Unobtainable secondary to aphasia.     Vitals:  /90   Pulse 86   Temp 98.1  F (36.7  C)   Resp 16   Wt 52.5 kg (115 lb 12.8 oz)   SpO2 92%   BMI 18.14 kg/m    Body mass index is 18.14 kg/m .  Exam:  GENERAL APPEARANCE:  Sleeping, in no distress, awakens easily  HEAD:  Normal, normocephalic, atraumatic  EYE EXAM: normal external eye, conjunctiva, lids, SHERICE  NECK EXAM: supple, no JVD  CHEST/RESP:  respiratory effort normal, lung sounds CTA , no respiratory distress  CV:  Rate reg, rhythm reg, no murmur, no peripheral edema   M/S:   extremities normal, gait normal-somewhat unsteady on feet-uses no device, normal muscle tone, and range of motion normal   SKIN EXAM: CDI, some bruising noted   NEUROLOGIC EXAM: Normal gross motor movement, tone and coordination. No tremor. Cranial nerves 2-12 are normal tested and grossly at patient's baseline  PSYCH:  Alert and oriented to unable to determine orientation due to expressive aphasia,  affect anxious at times, judgement impaired by cognitive losses       Lab/Diagnostic data:   Recent labs in [facility] reviewed by me today.     ASSESSMENT/PLAN  Cerebrovascular accident (CVA) due to embolism of left anterior cerebral artery (H)  Expressive aphasia  Without new symptoms, speech still impaired.     Chronic atrial  fibrillation (H)  Long term current use of anticoagulant therapy  Continues with often elevated heart rate, irregular in rhythm.  On apixaban.      Dementia without behavioral disturbance, unspecified dementia type  Difficult to determine level of dementia, with expressive aphasia.  Is confused.     Essential hypertension  BP goals are  <140/90 mm Hg.This is higher than ACC and AHA recommendations due to risk for hypotension, risk of dizziness and falls, risk of tissue/cerebral hypoperfusion and frailty. Patient is stable with current plan of care and routine assessment.     Pain  Often noted to have some discomfort, improved with tylenol   - acetaminophen (TYLENOL) 325 MG tablet; Take 2 tablets (650 mg) by mouth 3 times daily      transcribed by : Bubba Diaz  1. No new orders at this time    Electronically signed by:  JADA Us CNP               Sincerely,        JADA Us CNP

## 2019-04-05 PROBLEM — R47.01 APHASIA: Status: ACTIVE | Noted: 2019-01-01

## 2019-04-05 PROBLEM — I63.422 CEREBROVASCULAR ACCIDENT (CVA) DUE TO EMBOLISM OF LEFT ANTERIOR CEREBRAL ARTERY (H): Status: ACTIVE | Noted: 2019-01-01

## 2019-04-05 PROBLEM — I10 ESSENTIAL HYPERTENSION: Status: ACTIVE | Noted: 2019-01-01

## 2019-04-13 PROBLEM — I48.91 RAPID ATRIAL FIBRILLATION (H): Status: ACTIVE | Noted: 2019-01-01

## 2019-04-13 PROBLEM — I48.91 A-FIB (H): Status: ACTIVE | Noted: 2019-01-01

## 2019-04-13 NOTE — TELEPHONE ENCOUNTER
Patient had a fall this AM - no injury. VS: /114 and . Staff gave PRN clonidine 0.1 mg x 1 - one hour later patient's /132 with  and c/o right arm pain.     PLAN  Asked staff to give nitroglycerin 0.4 mg PO/SL now. Check BP/HR every 15 min x 2 - let provider know if remains elevated or ongoing arm pain     Electronically signed by JADA Metcalf, GNP

## 2019-04-13 NOTE — ED NOTES
Attempted to contact pt's primary contact as listed on her [facility] demographics. No answer received and left message to have contact, pt's daughter, call back.

## 2019-04-13 NOTE — ED NOTES
Spoke with pt's son, Gildardo, via phone at [phone]. Son states he would prefer pt to remain in the hospital if unable to lower the pt's heart rate and blood pressure. MD made aware.

## 2019-04-13 NOTE — ED TRIAGE NOTES
Patient here after fall at [facility], [place]. She has a hx of afib and is here in afib with RVR rate to140. She states she wishes she were dead and does not like where she is living. MD aware.

## 2019-04-13 NOTE — H&P
"ADMISSION HISTORY AND PHYSICAL  Patient Name: Citlalli Villarreal  Address: [address] [address]  Age:73 year old  Sex: female  Admission Date/Time: 4/13/2019 12:20 PM  Admitting provider: No admitting provider for patient encounter.  Hospital Attending Physician: Usama Schwartz, *   Primary Care Provider: Natasha Diaz    CHIEF COMPLAINT: Fall.    HPI:   Patient with a history of A. fib [on rate control and anticoagulation with Eliquis], hyperlipidemia, hypertension, GERD, mood disorder amongst other medical problems as outlined below.  Patient has baseline dementia and is not  able to provide history during my evaluation.  History below as documented by ED physician:  \"EMS reports patient was found on the floor at 7:50 AM.  Fall was not witnessed.  There was concern by care staff the patient remained hypertensive despite receiving clonidine and nitroglycerin with systolic pressures ranging from 150-170 and diastolic pressure ranging from 110-125.  Patient was also notably tachycardic consistent with rapid atrial fibrillation.  Patient is listed to be DNR/DNI per directives from her care facility dated March 19, 2019.  No interventions were administered on transport patient was reported to be a second assist with help up to the wheelchair.  Patient did not complain of any neck pain no back pain or hip pain.  She was notably tachypneic with respiratory [id number] per EMS.  Patient was seen upon arrival as a trauma evaluation for concern for fall with rapid atrial fibrillation.  Patient was unable to provide a coherent history.  She did not appear in any distress.  There was no family at the bedside to provide additional history upon ED arrival\".        REVIEW OF SYSTEMS  Unable to complete due to underlying dementia.      PAST MEDICAL HISTORY  Past Medical History:   Diagnosis Date     Atrial fibrillation (H)      Cerebral infarction (H)      Depressive disorder      " Gastroesophageal reflux disease      Gastrointestinal hemorrhage      History of embolic stroke with hemorrhagic conversion 10/15/2018. 3/10/2019     Hyperlipemia      Hypertension      Major depressive disorder      Mild cognitive impairment, so stated      Neurologic neglect syndrome      Neuromuscular dysfunction of bladder, unspecified      Nontraumatic intracranial hemorrhage (H)      Occlusion and stenosis of bilateral carotid arteries      Radial fracture     right     Repeated falls      Right wrist fracture 10/2018          PAST SURGICAL HISTORY  Past Surgical History:   Procedure Laterality Date     FOREARM SURGERY  10/2018    fracture repair          MEDICATIONS  No current outpatient medications on file.          ALLERGIES  Patient has no known allergies.        FAMILY HISTORY  Family History   Family history unknown: Yes          SOCIAL HISTORY  Social History     Socioeconomic History     Marital status: Single     Spouse name: Not on file     Number of children: Not on file     Years of education: Not on file     Highest education level: Not on file   Occupational History     Not on file   Social Needs     Financial resource strain: Not on file     Food insecurity:     Worry: Not on file     Inability: Not on file     Transportation needs:     Medical: Not on file     Non-medical: Not on file   Tobacco Use     Smoking status: Unknown If Ever Smoked     Smokeless tobacco: Never Used   Substance and Sexual Activity     Alcohol use: No     Frequency: Never     Drug use: No     Sexual activity: Not Currently   Lifestyle     Physical activity:     Days per week: Not on file     Minutes per session: Not on file     Stress: Not on file   Relationships     Social connections:     Talks on phone: Not on file     Gets together: Not on file     Attends Rastafari service: Not on file     Active member of club or organization: Not on file     Attends meetings of clubs or organizations: Not on file     Relationship  status: Not on file     Intimate partner violence:     Fear of current or ex partner: Not on file     Emotionally abused: Not on file     Physically abused: Not on file     Forced sexual activity: Not on file   Other Topics Concern     Parent/sibling w/ CABG, MI or angioplasty before 65F 55M? Not Asked   Social History Narrative    3/6/2019: Spoke with Patient's Son Gildardo, patient has been estranged from her family for the past 15 years until this fall when she was hospitalized and found to have a stroke. Per her son, she has had a history of anger issues and outburst and they wonder if she has some sort of underlying mental illness. She now has issues with memory and continues to have angry outburst. It has been recommended by the nurse practitioner at [facility] that Gildardo applies for guardianship, which is has been planning to do but is somewhat hesitant to complete due to his strained relationship with his mother.          PHYSICAL EXAM  Vitals:    04/13/19 2015 04/13/19 2030 04/13/19 2045 04/13/19 2100   BP:  (!) 107/91  (!) 137/120   BP Location:       Pulse:  125  83   Resp: 19 (!) 38 10 16   Temp:       TempSrc:       SpO2:  100% 100% 97%   Weight:       Height:         General - Awake and alert, not in any obvious distress. Afebrile, not pale, well hydrated.  Head - Normocephalic, atraumatic.  Lungs - Clear to auscultation bilaterally, no wheezes, rales or rhonchi.  CV - S1S2 irregularly irregular, no murmurs, rubs or gallops.  Abdomen - Non-tender, non-distended, positive bowel sounds, no masses, no hepatosplenomegaly. No rebound or guarding.   Musculoskeletal: no obvious deformity, muscle bulk- somewhat decreased in upper a lower extremities.  Skin - Skin exam is non focal but grossly warm, dry, intact. No rashes or erythema.  Neurologic - Cranial nerves 2-12 intact.  Psych - Judgment and mental status are clear, patient has reasonable insight. Mood is stable.        LABORATORY  Results for orders placed  or performed during the hospital encounter of 04/13/19   Head CT w/o contrast    Narrative    CT SCAN OF THE HEAD WITHOUT CONTRAST   4/13/2019 1:48 PM     HISTORY: Altered level of consciousness (LOC), unexplained; ; A  witnessed fall out of bed, cognitive impairments.  On Eliquis.   Evaluate for acute intracranial process due to trauma post fall    TECHNIQUE:  Axial images of the head and coronal reformations without  IV contrast material. Radiation dose for this scan was reduced using  automated exposure control, adjustment of the mA and/or kV according  to patient size, or iterative reconstruction technique.    COMPARISON: None.    FINDINGS:     Intracranial contents: There is diffuse parenchymal volume loss.   White matter changes are present in the cerebral hemispheres that are  consistent with small vessel ischemic disease in this age patient.  There is a chronic area of encephalomalacia in the left occipital  lobe. There is no evidence of intracranial hemorrhage, mass, acute  infarct or anomaly.    Visualized orbits/sinuses/mastoids:  The visualized portions of the  sinuses and mastoids appear normal.    Osseous structures/soft tissues:  No intracranial hemorrhage or skull  fractures.      Impression    IMPRESSION:   1. No intracranial hemorrhage or skull fractures.  2. Chronic encephalomalacia in the left occipital lobe.  3. There is diffuse parenchymal volume loss.  White matter changes are  present in the cerebral hemispheres that are consistent with small  vessel ischemic disease in this age patient.      NIDIA MCKEON MD   Cervical spine CT w/o contrast    Narrative    CT CERVICAL SPINE WITHOUT CONTRAST   4/13/2019 1:48 PM     HISTORY: Unwitnessed fall at care facility.  Evaluate for acute bony  process due to trauma., Unwitnessed fall at care facility.  Evaluate  for acute bony process due to trauma.     TECHNIQUE: Axial images of the cervical spine were obtained without  intravenous contrast. Multiplanar  reformations were performed.   Radiation dose for this scan was reduced using automated exposure  control, adjustment of the mA and/or kV according to patient size, or  iterative reconstruction technique.    COMPARISON: None.    FINDINGS: There is no evidence of fracture. Alignment is normal.      Craniocervical junction: Normal.     C1-C2:  Normal.     C2-C3:  There is ankylosis of the right facet joint. The neural  foramen are patent.     C3-C4:  Severe degenerative change in the left facet joint. Mild  annular bulge. Central canal and neural foramen are patent.     C4-C5:  Central canal normal. Mild degenerative change in the facet  joints.     C5-C6:  Loss of disc space height. Small central disc osteophyte  complex. Central canal normal. Mild bilateral foraminal stenosis.      C6-C7:  Normal disc, facet joints, spinal canal and neural foramina.      C7-T1:   Normal disc, facet joints, spinal canal and neural foramina.      Impression    IMPRESSION:    1. Negative for fracture.  2. Multilevel arthritic change.    NIDIA MCKEON MD   Chest XR,  PA & LAT    Narrative    XR CHEST TWO VIEWS   4/13/2019 1:41 PM     HISTORY: Unwitnessed fall out of bed, tachypnea. Evaluate for focal  infiltrate versus rib fracture versus other acute cardiothoracic  process.    COMPARISON: 3/6/2019.      Impression    IMPRESSION: Interstitial markings most prominent in the upper lobes  are somewhat more prominent than on the prior study which may be a  combination of baseline fibrosis and perhaps some mild interstitial  edema related to congestive heart failure. Heart size is upper-normal  to mildly enlarged. No pleural fluid or focal infiltrate.    ADONIS ECKERT MD   Radius/Ulna XR,  PA &LAT, left    Narrative    XR LEFT FOREARM TWO VIEWS   4/13/2019 1:42 PM     HISTORY: A witnessed fall at care facility. Left forearm pain.  Evaluate for bony process due to trauma.    COMPARISON: None.      Impression    IMPRESSION: Old ulnar styloid  avulsion fracture. No evidence of acute  fracture or subluxation. No evidence of elbow joint effusion. Mild  radiocarpal degenerative changes.    ADONIS ECKERT MD   Humerus XR,  G/E 2 views, left    Narrative    XR LEFT HUMERUS TWO OR MORE VIEWS   4/13/2019 1:42 PM     HISTORY: Unwitnessed fall at care facility. Left arm pain. Evaluate  for bony process due to trauma.    COMPARISON: None.      Impression    IMPRESSION: No evidence of acute fracture or subluxation.    ADONIS ECKERT MD   UA reflex to Microscopic   Result Value Ref Range    Color Urine Yellow     Appearance Urine Clear     Glucose Urine Negative NEG^Negative mg/dL    Bilirubin Urine Negative NEG^Negative    Ketones Urine Negative NEG^Negative mg/dL    Specific Gravity Urine 1.020 1.003 - 1.035    Blood Urine Negative NEG^Negative    pH Urine 6.0 5.0 - 7.0 pH    Protein Albumin Urine 30 (A) NEG^Negative mg/dL    Urobilinogen mg/dL 4.0 (H) 0.0 - 2.0 mg/dL    Nitrite Urine Negative NEG^Negative    Leukocyte Esterase Urine Negative NEG^Negative    Source Catheterized Urine     RBC Urine 3 (H) 0 - 2 /HPF    WBC Urine 2 0 - 5 /HPF   CBC with platelets differential   Result Value Ref Range    WBC 7.7 4.0 - 11.0 10e9/L    RBC Count 4.16 3.8 - 5.2 10e12/L    Hemoglobin 12.6 11.7 - 15.7 g/dL    Hematocrit 39.8 35.0 - 47.0 %    MCV 96 78 - 100 fl    MCH 30.3 26.5 - 33.0 pg    MCHC 31.7 31.5 - 36.5 g/dL    RDW 13.7 10.0 - 15.0 %    Platelet Count 243 150 - 450 10e9/L    Diff Method Automated Method     % Neutrophils 60.0 %    % Lymphocytes 27.1 %    % Monocytes 8.3 %    % Eosinophils 3.6 %    % Basophils 0.9 %    % Immature Granulocytes 0.1 %    Nucleated RBCs 0 0 /100    Absolute Neutrophil 4.6 1.6 - 8.3 10e9/L    Absolute Lymphocytes 2.1 0.8 - 5.3 10e9/L    Absolute Monocytes 0.6 0.0 - 1.3 10e9/L    Absolute Eosinophils 0.3 0.0 - 0.7 10e9/L    Absolute Basophils 0.1 0.0 - 0.2 10e9/L    Abs Immature Granulocytes 0.0 0 - 0.4 10e9/L    Absolute Nucleated RBC 0.0     Basic metabolic panel   Result Value Ref Range    Sodium 140 133 - 144 mmol/L    Potassium 4.7 3.4 - 5.3 mmol/L    Chloride 110 (H) 94 - 109 mmol/L    Carbon Dioxide 25 20 - 32 mmol/L    Anion Gap 5 3 - 14 mmol/L    Glucose 96 70 - 99 mg/dL    Urea Nitrogen 27 7 - 30 mg/dL    Creatinine 0.92 0.52 - 1.04 mg/dL    GFR Estimate 61 >60 mL/min/[1.73_m2]    GFR Estimate If Black 71 >60 mL/min/[1.73_m2]    Calcium 8.3 (L) 8.5 - 10.1 mg/dL   Troponin I   Result Value Ref Range    Troponin I ES 0.050 (H) 0.000 - 0.045 ug/L       EKG: A-Fib with RVR      ASSESSMENT/PLAN    Principal Problem:    Rapid atrial fibrillation (H)/  History of Embolic stroke due to atrial fibrillation (H): Patient with a history of A. Fib.  Recently started on Eliquis during 3/20/2019 admission after she had developed expressive aphasia likely secondary to embolic stroke due to A. fib.  Currently on diltiazem and metoprolol for rate control but admitted with A. fib with RVR.  Unable to rate control with metoprolol in the ICU so diltiazem drip started.   -Admit to inpatient status in ICU  -Continue IV diltiazem for rate control  -Continue anticoagulation with Eliquis  - May need to have metoprolol prior to discharge for better rate control  -Troponin just slightly above normal likely demand, will check serial troponins.      Active Problems:    Fall: Query cause.  Recent past admissions where she had also falling, she was also noted to be in A. fib.      HLD (hyperlipidemia): Continue prior to admission Lipitor      Cognitive impairment: Known baseline dementia, with likely  delirium this admission.  Close monitoring and redirection      Current moderate episode of major depressive disorder, unspecified whether recurrent (H): Continue PTA Zoloft      Chronic constipation: Continue PTA MiraLAX and senna      Gastroesophageal reflux disease with esophagitis: Continue PTA omeprazole      Essential hypertension: Continue PTA metoprolol and  diltiazem    Prophylaxis -on anticoagulation  Disposition -pending better rate control    Attestation:   I have reviewed today's vital signs, notes, medications, labs and imaging.   Amount of time spent on this H&P note: 45 minutes.   Jovana Deluna MD

## 2019-04-13 NOTE — ED PROVIDER NOTES
History     Chief Complaint   Patient presents with     Fall     afib with RVR     HPI  Citlalli Villarreal is a 73 year old female with multiple comorbidities including history of CVA, hypertension, CHF, chronic atrial fibrillation who is currently on Eliquis arrives by EMS from [place] for evaluation for complaint of arm pain after an unwitnessed fall at her care facility.  History is obtained primarily from EMS as patient has cognitive impairment and is unable to provide a coherent history.  EMS reports patient was found on the floor at 7:50 AM.  Fall was not witnessed.  There was concern by care staff the patient remained hypertensive despite receiving clonidine and nitroglycerin with systolic pressures ranging from 150-170 and diastolic pressure ranging from 110-125.  Patient was also notably tachycardic consistent with rapid atrial fibrillation.  Patient is listed to be DNR/DNI per directives from her care facility dated March 19, 2019.  No interventions were administered on transport patient was reported to be a second assist with help up to the wheelchair.  Patient did not complain of any neck pain no back pain or hip pain.  She was notably tachypneic with respiratory [id number] per EMS.  Patient was seen upon arrival as a trauma evaluation for concern for fall with rapid atrial fibrillation.  Patient was unable to provide a coherent history.  She did not appear in any distress.  There was no family at the bedside to provide additional history upon ED arrival.    Allergies:  Not on File    Problem List:    Patient Active Problem List    Diagnosis Date Noted     Cerebrovascular accident (CVA) due to embolism of left anterior cerebral artery (H) 04/05/2019     Priority: Medium     Essential hypertension 04/05/2019     Priority: Medium     CHF (congestive heart failure) (H) 03/22/2019     Priority: Medium     Hypertensive heart disease without heart failure 03/22/2019     Priority: Medium     Chronic atrial  fibrillation (H) 03/22/2019     Priority: Medium     Long term current use of anticoagulant therapy 03/22/2019     Priority: Medium     Carotid stenosis, bilateral 03/22/2019     Priority: Medium     Expressive aphasia 03/10/2019     Priority: Medium     Embolic stroke due to atrial fibrillation (H) 03/10/2019     Priority: Medium     History of embolic stroke with hemorrhagic conversion 10/15/2018. 03/10/2019     Priority: Medium     Other constipation 03/09/2019     Priority: Medium     Gastroesophageal reflux disease with esophagitis 03/09/2019     Priority: Medium     Atrial fibrillation with RVR (H) 03/06/2019     Priority: Medium     Cerebral infarction (H)      Priority: Medium     Current moderate episode of major depressive disorder, unspecified whether recurrent (H) 01/02/2019     Priority: Medium     Dementia without behavioral disturbance, unspecified dementia type 01/02/2019     Priority: Medium     Chronic idiopathic constipation 12/13/2018     Priority: Medium     Cognitive impairment 11/21/2018     Priority: Medium     Gastrointestinal hemorrhage, unspecified gastrointestinal hemorrhage type 11/21/2018     Priority: Medium     Altered mental status, unspecified altered mental status type 10/16/2018     Priority: Medium     Carotid stenosis, asymptomatic, bilateral 10/16/2018     Priority: Medium     Deny-neglect of right side 10/16/2018     Priority: Medium     Intracranial atherosclerosis 10/16/2018     Priority: Medium     Overview:   Severe and diffuse       Compression of brain (H) 10/15/2018     Priority: Medium     HLD (hyperlipidemia) 10/15/2018     Priority: Medium     Recurrent falls 10/15/2018     Priority: Medium     Traumatic rhabdomyolysis, initial encounter (H) 10/15/2018     Priority: Medium     Fracture of right distal radius 10/14/2018     Priority: Medium     Overview:   Added automatically from request for surgery [id number]       Intracranial bleed (H) 10/14/2018     Priority: Medium         Past Medical History:    Past Medical History:   Diagnosis Date     Atrial fibrillation (H)      Cerebral infarction (H)      Gastroesophageal reflux disease      Gastrointestinal hemorrhage      History of embolic stroke with hemorrhagic conversion 10/15/2018. 3/10/2019     Hyperlipemia      Major depressive disorder      Mild cognitive impairment, so stated      Neurologic neglect syndrome      Neuromuscular dysfunction of bladder, unspecified      Nontraumatic intracranial hemorrhage (H)      Occlusion and stenosis of bilateral carotid arteries      Radial fracture      Repeated falls      Right wrist fracture 10/2018       Past Surgical History:    Past Surgical History:   Procedure Laterality Date     FOREARM SURGERY  10/2018    fracture repair       Family History:    Family History   Family history unknown: Yes       Social History:  Marital Status:  Single [1]  Social History     Tobacco Use     Smoking status: Unknown If Ever Smoked     Smokeless tobacco: Never Used   Substance Use Topics     Alcohol use: No     Frequency: Never     Drug use: No        Medications:      acetaminophen (TYLENOL) 325 MG tablet   apixaban ANTICOAGULANT (ELIQUIS) 5 MG tablet   aspirin (ASA) 81 MG tablet   atorvastatin (LIPITOR) 40 MG tablet   cloNIDine (CATAPRES) 0.1 MG tablet   diltiazem ER (TIAZAC) 360 MG 24 hr ER beaded capsule   gabapentin (NEURONTIN) 100 MG capsule   metoprolol tartrate (LOPRESSOR) 25 MG tablet   MULTIPLE VITAMINS-MINERALS PO   omeprazole 20 MG tablet   oseltamivir (TAMIFLU) 75 MG capsule   polyethylene glycol (MIRALAX/GLYCOLAX) packet   senna (SENOKOT) 8.6 MG tablet   sertraline (ZOLOFT) 25 MG tablet         Review of Systems   Constitutional:        Unwitnessed fall at care facility   HENT: Negative.    Eyes: Negative.    Respiratory: Negative.    Cardiovascular: Negative.    Gastrointestinal: Negative.    Endocrine: Negative.    Genitourinary: Negative.    Musculoskeletal:        Left arm pain    Skin: Negative.    Allergic/Immunologic: Negative.    Neurological: Negative.    Hematological: Negative.    Psychiatric/Behavioral: Positive for confusion.       Physical Exam   BP: (!) 133/103  Pulse: 123  Heart Rate: 117  Temp: 98.1  F (36.7  C)  Resp: 18  SpO2: 98 %      Physical Exam   Constitutional: She is oriented to person, place, and time. She appears well-developed and well-nourished. No distress.   HENT:   Head: Normocephalic and atraumatic.   Eyes: Pupils are equal, round, and reactive to light. EOM are normal. Right eye exhibits no discharge. Left eye exhibits no discharge. No scleral icterus.   Neck: Normal range of motion. Neck supple.   Cardiovascular: An irregularly irregular rhythm present. Tachycardia present.   Pulmonary/Chest: Effort normal and breath sounds normal. No stridor. No respiratory distress. She has no wheezes. She has no rales. She exhibits no tenderness.   Neurological: She is alert and oriented to person, place, and time. She displays normal reflexes. No cranial nerve deficit. She exhibits normal muscle tone. Coordination normal.   Skin: Skin is warm. Capillary refill takes less than 2 seconds. No rash noted. No erythema. No pallor.   Psychiatric: She has a normal mood and affect. Her behavior is normal. Judgment and thought content normal.       ED Course        Procedures               EKG Interpretation:      Interpreted by Usama Schwartz  Time reviewed: 12:36  Symptoms at time of EKG: Post fall, noted to be in rapid afib on EMS transport.   Rhythm: atrial fibrillation - rapid  Rate: 120-130  Axis: Left Axis Deviation  Ectopy: none  Conduction: normal  ST Segments/ T Waves: ST-T changes with T wave depression in inversion and hyperacute ST segment in the septum in V2 and V3 consistent with related change from rapid A. fib  Q Waves: nonspecific  Comparison to prior: Unchanged from 3/9/19    Clinical Impression: Rapid atrial fibrillation      Critical Care time:  none                          ED medications:  Medications   metoprolol (LOPRESSOR) injection 2.5 mg (2.5 mg Intravenous Given 4/13/19 1659)   diltiazem (CARDIZEM) 125 mg in sodium chloride 0.9 % 125 mL infusion (has no administration in time range)   metoprolol tartrate (LOPRESSOR) tablet 25 mg (25 mg Oral Given 4/13/19 1243)   metoprolol (LOPRESSOR) injection 2.5 mg (2.5 mg Intravenous Given 4/13/19 1547)       ED labs and imaging:  Results for orders placed or performed during the hospital encounter of 04/13/19   Head CT w/o contrast    Narrative    CT SCAN OF THE HEAD WITHOUT CONTRAST   4/13/2019 1:48 PM     HISTORY: Altered level of consciousness (LOC), unexplained; ; A  witnessed fall out of bed, cognitive impairments.  On Eliquis.   Evaluate for acute intracranial process due to trauma post fall    TECHNIQUE:  Axial images of the head and coronal reformations without  IV contrast material. Radiation dose for this scan was reduced using  automated exposure control, adjustment of the mA and/or kV according  to patient size, or iterative reconstruction technique.    COMPARISON: None.    FINDINGS:     Intracranial contents: There is diffuse parenchymal volume loss.   White matter changes are present in the cerebral hemispheres that are  consistent with small vessel ischemic disease in this age patient.  There is a chronic area of encephalomalacia in the left occipital  lobe. There is no evidence of intracranial hemorrhage, mass, acute  infarct or anomaly.    Visualized orbits/sinuses/mastoids:  The visualized portions of the  sinuses and mastoids appear normal.    Osseous structures/soft tissues:  No intracranial hemorrhage or skull  fractures.      Impression    IMPRESSION:   1. No intracranial hemorrhage or skull fractures.  2. Chronic encephalomalacia in the left occipital lobe.  3. There is diffuse parenchymal volume loss.  White matter changes are  present in the cerebral hemispheres that are consistent with  small  vessel ischemic disease in this age patient.      NIDIA MCKEON MD   Cervical spine CT w/o contrast    Narrative    CT CERVICAL SPINE WITHOUT CONTRAST   4/13/2019 1:48 PM     HISTORY: Unwitnessed fall at care facility.  Evaluate for acute bony  process due to trauma., Unwitnessed fall at care facility.  Evaluate  for acute bony process due to trauma.     TECHNIQUE: Axial images of the cervical spine were obtained without  intravenous contrast. Multiplanar reformations were performed.   Radiation dose for this scan was reduced using automated exposure  control, adjustment of the mA and/or kV according to patient size, or  iterative reconstruction technique.    COMPARISON: None.    FINDINGS: There is no evidence of fracture. Alignment is normal.      Craniocervical junction: Normal.     C1-C2:  Normal.     C2-C3:  There is ankylosis of the right facet joint. The neural  foramen are patent.     C3-C4:  Severe degenerative change in the left facet joint. Mild  annular bulge. Central canal and neural foramen are patent.     C4-C5:  Central canal normal. Mild degenerative change in the facet  joints.     C5-C6:  Loss of disc space height. Small central disc osteophyte  complex. Central canal normal. Mild bilateral foraminal stenosis.      C6-C7:  Normal disc, facet joints, spinal canal and neural foramina.      C7-T1:   Normal disc, facet joints, spinal canal and neural foramina.      Impression    IMPRESSION:    1. Negative for fracture.  2. Multilevel arthritic change.    NIDIA MCKEON MD   Chest XR,  PA & LAT    Narrative    XR CHEST TWO VIEWS   4/13/2019 1:41 PM     HISTORY: Unwitnessed fall out of bed, tachypnea. Evaluate for focal  infiltrate versus rib fracture versus other acute cardiothoracic  process.    COMPARISON: 3/6/2019.      Impression    IMPRESSION: Interstitial markings most prominent in the upper lobes  are somewhat more prominent than on the prior study which may be a  combination of baseline fibrosis  and perhaps some mild interstitial  edema related to congestive heart failure. Heart size is upper-normal  to mildly enlarged. No pleural fluid or focal infiltrate.    ADONIS ECKERT MD   Radius/Ulna XR,  PA &LAT, left    Narrative    XR LEFT FOREARM TWO VIEWS   4/13/2019 1:42 PM     HISTORY: A witnessed fall at care facility. Left forearm pain.  Evaluate for bony process due to trauma.    COMPARISON: None.      Impression    IMPRESSION: Old ulnar styloid avulsion fracture. No evidence of acute  fracture or subluxation. No evidence of elbow joint effusion. Mild  radiocarpal degenerative changes.    ADONIS ECKERT MD   Humerus XR,  G/E 2 views, left    Narrative    XR LEFT HUMERUS TWO OR MORE VIEWS   4/13/2019 1:42 PM     HISTORY: Unwitnessed fall at care facility. Left arm pain. Evaluate  for bony process due to trauma.    COMPARISON: None.      Impression    IMPRESSION: No evidence of acute fracture or subluxation.    ADONIS ECKERT MD   UA reflex to Microscopic   Result Value Ref Range    Color Urine Yellow     Appearance Urine Clear     Glucose Urine Negative NEG^Negative mg/dL    Bilirubin Urine Negative NEG^Negative    Ketones Urine Negative NEG^Negative mg/dL    Specific Gravity Urine 1.020 1.003 - 1.035    Blood Urine Negative NEG^Negative    pH Urine 6.0 5.0 - 7.0 pH    Protein Albumin Urine 30 (A) NEG^Negative mg/dL    Urobilinogen mg/dL 4.0 (H) 0.0 - 2.0 mg/dL    Nitrite Urine Negative NEG^Negative    Leukocyte Esterase Urine Negative NEG^Negative    Source Catheterized Urine     RBC Urine 3 (H) 0 - 2 /HPF    WBC Urine 2 0 - 5 /HPF           ED Vitals:  Vitals:    04/13/19 1700 04/13/19 1701 04/13/19 1715 04/13/19 1730   BP: (!) 165/131  (!) 157/130 (!) 152/117   Pulse: 134  125 124   Resp: 18 20 25 26   Temp:       TempSrc:       SpO2:  92%  96%     Assessments & Plan (with Medical Decision Making)   Clinical impression: 73-year-old female with multiple comorbidities including a known history of atrial  fibrillation,CVA and hypertension who arrived from her care facility after an unwitnessed fall early this morning.  She is in rapid atrial fibrillation with difficult to control rate and no gross bony injury as a result of her unwitnessed fall.  She also has uncontrolled hypertension cannot exclude malignant hypertension.   Patient is reported by EMS to have been found on the floor at 7:50 AM.  History is limited from the patient due to underlying history of cognitive impairment.  EMS report staff at her care facility were concerned that she was hypertensive with blood pressure ranging from 150/110-160 5/125.  She received clonidine and nitroglycerin and remained tachycardic and hypertensive and complained of forearm pain and discomfort.  Patient is DNR/DNI per medical records provided from her care facility dated March 19, 2019.  No interventions were completed by EMS on transport however upon arrival patient is confused, tangential when asked questions.  She is pointing to her left forearm.  She has no gross bony deformity about the chest wall, pelvis or  extremities.  She has no back pain or flank pain and no hip pain.  GCS was 15. She is in rapid atrial fibrillation with rates ranging from 120-140      ED course and Plan:  In reviewing patient's medical records it appears she is on metoprolol 25 mg orally BID, aspirin, clonidine, Eliquis, Lipitor, diltiazem 360 mg 24-hour release, Neurontin, omeprazole.  With unwitnessed fall, age, patient on Eliquis CT imaging of the head and neck was obtained.  A chest x-ray was also obtained with report of tachypnea on prehospital transportation to evaluate for pneumonia versus focal infiltrate.   With rapid atrial fibrillation underlying history of A. fib patient on metoprolol and diltiazem patient was given a dose of oral metoprolol.    EKG on ED arrival confirmed rapid atrial fibrillation with ST-T changes consistent with rate related change and no significant change from  EKG dated March 9, 2019.    Patient has a normal catheterized urinalysis showing no bacteriuria or hematuria.  CT imaging of the head and neck did not show any acute process related to trauma.  Please see the interpreting radiologist report above.  X-ray of the left humerus and forearm was also negative for acute bony process related to trauma.  Chest x-ray did not show any pleural effusion or rib fractures however there is some incidental abnormalities see the interpreting radiologist report above.   Patient remained in rapid atrial fibrillation after a dose of Oral metoprolol 25 mg.  After 2-hour period of attempted rate control I elected to trial her IV metoprolol.  She was given 2.5 mg every 30 minutes x 4 doses.    I spoke with -admitting hospitalist at 5:50 PM.  She agreed to assume care upon admission after discussion about patient's history, presentation, ED course of care, interventions and discussion with family about goals of care with DNR/DNI status.  We agreed to start the patient on IV diltiazem drip for better rate control and for further care.  Patient will be admitted to the medical ICU for closer hemodynamic monitoring for uncontrolled hypertension with rapid atrial fibrillation in the context of history of known atrial fibrillation.  There was no family present during patient's entire course of care in the emergency department.    Family contact is Judith Villarreal (daughter)- [phone]      Critical care time is 60 minutes.      Disclaimer: This note consists of symbols derived from keyboarding, dictation and/or voice recognition software. As a result, there may be errors in the script that have gone undetected. Please consider this when interpreting information found in this chart.  I have reviewed the nursing notes.    I have reviewed the findings, diagnosis, plan and need for follow up with the patient.          Medication List      There are no discharge medications for this  visit.         Final diagnoses:   Rapid atrial fibrillation (H) - history of atrial fibrillation, on apixaban, Metoprol and diltiazem   Fall, initial encounter - Unwitnessed found on the floor at her care facility at [place] at 7:50 AM   Poorly-controlled hypertension       4/13/2019   [facility] EMERGENCY DEPARTMENT     Usama Schwartz MD  04/13/19 7022

## 2019-04-14 NOTE — PROGRESS NOTES
Discharge Planner   Discharge Plans in progress: Return to [facility].  Barriers to discharge plan: Medical stability   Follow up plan: Follow up with PCP.       Entered by: Rosanne Combs 04/14/2019 12:18 PM

## 2019-04-14 NOTE — PROGRESS NOTES
[place] [facility] [place]    Patient admitted to room 1005 at approximately 1950  via cart from emergency room. Patient was accompanied by transport tech and nurse.     Verbal SBAR report received from [facility] RN prior to patient arrival.     Patient ambulated to bed with one assist. Patient alert and oriented X 1. The patient is not having any pain.  . Admission vital signs: Blood pressure 119/88, pulse 114, temperature 97.5  F (36.4  C), temperature source Oral, resp. rate 12, height 1.702 m (5' 7\"), weight 53.4 kg (117 lb 11.6 oz), SpO2 100 %, not currently breastfeeding. Patient was oriented to plan of care, call light, bed controls, tv, telephone, bathroom and visiting hours.     Risk Assessment    The following safety risks were identified during admission: fall. Yellow risk band applied: YES.     Skin Initial Assessment    This writer admitted this patient and completed a full skin assessment and Hood score in the Adult PCS flowsheet. Appropriate interventions initiated as needed.     Secondary skin check completed by Donna ARROYO RN.    Hood Risk Assessment  Sensory Perception: 3-->slightly limited  Moisture: 4-->rarely moist  Activity: 3-->walks occasionally  Mobility: 3-->slightly limited  Nutrition: 3-->adequate  Friction and Shear: 2-->potential problem  Hood Score: 18  Bed Support Surface: Atmos Air mattress  Hood Intervention(s) Implemented: assistive lifting/lateral transfer device, draw sheets, dressing to sacrum, encouraged fluids, heels suspended    Cindy Johanson "

## 2019-04-14 NOTE — PROGRESS NOTES
Pt has been bradycardic when asleep as low as a rate of 27. Heart rate increases when awake. B/P stable. Ambulated to bathroom, ataxic gait. Refused dinner but did ask for pop, and is sipping diet ginger ale.  Lungs are lear, diminished. O2 removed, will monitor saturation. Pt denies any discomfort, has full ROM of both arms. Mood changes quickly but had been pleasant for the most part. Pt does follow commands and is able to sit upright in bed independently.. Pt's physician has changed cardiac medications. Will monitor effectiveness of these changes..

## 2019-04-14 NOTE — PROGRESS NOTES
04/14/19 1302   Quick Adds   Type of Visit Initial PT Evaluation   Living Environment   Lives With facility resident  ([facility] per notes)   Living Arrangements extended care facility   Living Environment Comment no family present but per notes lives at [facility] facility   Self-Care   Current Activity Tolerance fair   Functional Level Prior   Fall history within last six months yes   General Information   Onset of Illness/Injury or Date of Surgery - Date 04/13/19   Referring Physician Jordy Sanchez MD   Patient/Family Goals Statement did not state goals   Pertinent History of Current Problem (include personal factors and/or comorbidities that impact the POC) found down on floor, high HR   Precautions/Limitations fall precautions   General Observations lying in bed, agreeable to walk   Cognitive Status Examination   Orientation not oriented to person, place or time   Range of Motion (ROM)   ROM Comment WFL B LEs   Bed Mobility   Bed Mobility Comments indep supine<>sit   Transfer Skills   Transfer Comments indep sit to stand   Gait   Gait Comments ambulation with HHA in flores 100 ft, without HHA pt likes to hold railing or wall, unsure if uses a walker at baseline but needs UE support   Balance   Balance Comments upon standing 1 LOB without UE support, with support no LOB, has path deviation of 1 to 2 ft    Clinical Impression   Criteria for Skilled Therapeutic Intervention evaluation only   PT Diagnosis falls   Influenced by the following impairments impaired balance   Functional limitations due to impairments impaired gait   Clinical Presentation Stable/Uncomplicated   Clinical Decision Making (Complexity) Low complexity   Anticipated Equipment Needs at Discharge   (unsure if uses a walker at baseline but would benefit)   Anticipated Discharge Disposition Home with Assist;Home with Home Therapy  (recommend home PT to assess home safety and reason for falls)   Risk & Benefits of therapy have been explained Yes  "  Patient, Family & other staff in agreement with plan of care Yes   [facility] AM-PAC TM \"6 Clicks\"   2016, Trustees of [facility], under license to [employer].  All rights reserved.   6 Clicks Short Forms Basic Mobility Inpatient Short Form   [facility] AM-PAC  \"6 Clicks\" V.2 Basic Mobility Inpatient Short Form   1. Turning from your back to your side while in a flat bed without using bedrails? 4 - None   2. Moving from lying on your back to sitting on the side of a flat bed without using bedrails? 4 - None   3. Moving to and from a bed to a chair (including a wheelchair)? 4 - None   4. Standing up from a chair using your arms (e.g., wheelchair, or bedside chair)? 4 - None   5. To walk in hospital room? 3 - A Little   6. Climbing 3-5 steps with a railing? 3 - A Little   Basic Mobility Raw Score (Score out of 24.Lower scores equate to lower levels of function) 22   Total Evaluation Time   Total Evaluation Time (Minutes) 8     "

## 2019-04-14 NOTE — PROGRESS NOTES
Discussion with MD after web paged at [facility] with update on patient's HR.  Will not be changing to Amiodarone as per Tele ICU recommendation.  Will stop Cardizem drip at this time and may restart if sustained HR , A Fib ventricular response goes over 100 and is sustained over 1-2 hours.  Labs ordered at this time per Tele-ICU suggestion.  Plan of care continues with close monitoring.

## 2019-04-14 NOTE — CONSULTS
Care Transition Initial Assessment - RN      Spoke with daughter.    DATA   Principal Problem:    Rapid atrial fibrillation (H)  Active Problems:    HLD (hyperlipidemia)    Cognitive impairment    Current moderate episode of major depressive disorder, unspecified whether recurrent (H)    Chronic constipation    Gastroesophageal reflux disease with esophagitis    Embolic stroke due to atrial fibrillation (H)    Essential hypertension       Primary Care Clinic Name: [facility]  Primary Care MD Name: Natasha Diaz  Contact information and PCP information verified: Yes    ASSESSMENT  Cognitive Status: confused.       Resources List: Skilled Nursing Facility     Description of Support System: Supportive, Involved   Who is your support system?: Children, Facility resident(s)/Staff   Support Assessment: Adequate family and caregiver support   Insurance Concerns: No Insurance issues identified      This writer spoke with daughter Susan as the patient is confused, introduced self and role. The patient lives at [facility] Phone (Main Phone:[phone] Admissions Phone:[phone] Fax: [phone]). Referral placed for [facility]. The patient will return to [facility] upon discharge. Unable to confirm bed hold over weekend. Care Transitions will arrange transportation upon discharge.    PLAN    Return to [facility]      Rosanne Combs RN, Care Coordinator [phone]

## 2019-04-14 NOTE — PROGRESS NOTES
"[facility] Hospitalist Progress Note           Assessment and Plan:       Rapid atrial fibrillation (H)/  History of Embolic stroke due to atrial fibrillation (H):   4/13/19 -- Patient with a history of A. Fib.  Recently started on Eliquis during 3/20/2019 admission after she had developed expressive aphasia likely secondary to embolic stroke due to A. fib.  Currently on diltiazem and metoprolol for rate control but admitted with A. fib with RVR.  Unable to rate control with metoprolol in the ICU so diltiazem drip started.   -Admit to inpatient status in ICU  -Continue IV diltiazem and home oral metoprolol for rate control  -Continue anticoagulation with Eliquis  -Troponin just slightly above normal likely demand, will check serial troponins.  4/14/2019 -- actually became bradycardic overnight so diltiazem drip was stopped and patient received her home metoprolol and diltiazem this AM.  Heart rate better but still running intermittently high with this - seems mostly in 110's, but ranging from .  Blood pressure stable.  We'll try adding an extra 12.5mg metoprolol now and increasing her pm dose to 37.5.  UPDATE: heart rate running mostly in the 40-50's, occasionally down into 20-30's when asleep since increased dose of metoprolol.  Currently mostly around 50.  Patient asymptomatic, blood pressure stable.  Plan to hole metoprolol dose tonight, resume home dosing in the AM with metoprolol 25 and will replace diltiazem with equivalent short acting dosage.  If unable to control rate with this would be a candidate for pacer, but discussed with patient today and she says she does not want a pacemaker placed and that if we can't get her heart rate under control she'd rather just \"let things take their course\".        Fall: suspect due to atrial fibrillation with rapid ventricular response but uncertain  4/14/2019 -- unwitnessed fall in patient with dementia, found by EMS.  Recent past admissions where she had also " "falling and was also noted to be in A. Fib.  Physical therapy and occupational therapy to assess as rate improves.          HLD (hyperlipidemia): Continue prior to admission Lipitor       Cognitive impairment:   4/13/19 -- Known baseline dementia, with likely  delirium this admission.  Close monitoring and redirection  4/14/2019 -- appears baseline.          Moderate major depressive disorder, unspecified whether recurrent (H):   4/13/19 -- Continue PTA Zoloft  4/14/2019 -- appears stable.         Chronic constipation:   Continue PTA MiraLAX and senna       Gastroesophageal reflux disease with esophagitis:   Continue PTA omeprazole       Essential hypertension:   4/14/2019 -- blood pressure doing good, continue home clonidine prn but increase parameters to prn for DBP > 110, metoprolol/dilt as above.       On prophylactic tamiflu from facility       Prophylaxis -on anticoagulation      Disposition  Hope for return to skilled care tomorrow if heart rate control improved with increasing metoprolol as above.              Interval History:   Patient reports feeling better today but hard to gauge with baseline dementia.  Denies any pain or dyspnea.  Says strength feels \"pretty good\" today but hasn't been up yet.  No fever or chills. Knows she's in the hospital. Was agreeable with taking medications this AM.  No new concerns.             Review of Systems:    ROS: 10 point ROS neg other than the symptoms noted above in the HPI.             Medications:   Current active medications and PTA medications reviewed, see medication list for details.            Physical Exam:   Vitals were reviewed  Patient Vitals for the past 24 hrs:   BP Temp Temp src Pulse Heart Rate Resp SpO2 Height Weight   04/14/19 0700 125/90 -- -- 72 79 11 99 % -- --   04/14/19 0645 -- -- -- -- 79 20 99 % -- --   04/14/19 0630 (!) 127/104 -- -- (!) 44 118 -- 95 % -- --   04/14/19 0615 -- -- -- -- 101 -- 99 % -- --   04/14/19 0600 (!) 113/96 -- -- 78 96 -- " 96 % -- --   04/14/19 0545 -- -- -- -- 90 -- 99 % -- --   04/14/19 0530 92/68 -- -- 93 75 -- 99 % -- --   04/14/19 0500 108/75 -- -- 92 54 -- 96 % -- --   04/14/19 0445 -- -- -- -- 68 -- 96 % -- --   04/14/19 0430 108/76 -- -- 82 109 -- 100 % -- --   04/14/19 0415 -- -- -- -- 108 -- 100 % -- --   04/14/19 0405 -- -- -- -- 122 16 100 % -- --   04/14/19 0400 120/76 97.8  F (36.6  C) Oral 88 114 9 (!) 88 % -- --   04/14/19 0345 -- -- -- -- 68 15 97 % -- --   04/14/19 0330 115/86 -- -- 63 63 18 97 % -- --   04/14/19 0315 -- -- -- -- 58 (!) 39 100 % -- --   04/14/19 0300 119/75 -- -- 65 55 16 100 % -- --   04/14/19 0245 -- -- -- -- 81 11 97 % -- --   04/14/19 0230 (!) 118/92 -- -- 93 88 13 97 % -- --   04/14/19 0215 -- -- -- -- 66 14 95 % -- --   04/14/19 0200 105/73 -- -- 74 70 18 95 % -- --   04/14/19 0145 -- -- -- -- 63 18 99 % -- --   04/14/19 0130 106/77 -- -- 71 95 16 96 % -- --   04/14/19 0115 -- -- -- -- 85 16 92 % -- --   04/14/19 0100 104/80 -- -- (!) 46 70 9 95 % -- --   04/14/19 0045 -- -- -- -- 68 18 100 % -- --   04/14/19 0030 110/78 -- -- 72 60 16 95 % -- --   04/14/19 0015 -- -- -- -- 51 20 98 % -- --   04/14/19 0000 94/65 98.2  F (36.8  C) Oral 53 70 12 100 % -- --   04/13/19 2345 -- -- -- -- 79 14 92 % -- --   04/13/19 2330 105/81 -- -- 66 82 16 97 % -- --   04/13/19 2325 107/74 -- -- 78 63 16 98 % -- --   04/13/19 2315 -- -- -- 66 65 9 -- -- --   04/13/19 2300 99/82 -- -- 58 75 19 95 % -- --   04/13/19 2245 108/76 -- -- 113 70 9 98 % -- --   04/13/19 2230 114/90 -- -- 63 103 14 94 % -- --   04/13/19 2215 (!) 117/101 -- -- 76 121 20 100 % -- --   04/13/19 2200 -- -- -- -- 123 24 92 % -- --   04/13/19 2145 119/88 -- -- 114 84 12 100 % -- --   04/13/19 2130 (!) 136/116 -- -- 56 92 24 98 % -- --   04/13/19 2115 (!) 131/105 -- -- 84 85 (!) 37 97 % -- --   04/13/19 2100 (!) 137/120 -- -- 83 101 16 97 % -- --   04/13/19 2045 -- -- -- -- 108 10 100 % -- --   04/13/19 2030 (!) 107/91 -- -- 125 128 (!) 38 100 %  "-- --   19 -- -- -- -- 115 19 -- -- --   19 -- -- -- -- 151 29 99 % -- --   19 -- -- -- -- 125 30 100 % -- --   19 (!) 122/95 -- -- -- 105 26 99 % -- --   19 (!) 122/95 97.5  F (36.4  C) Oral 124 109 11 99 % 1.702 m (5' 7\") 53.4 kg (117 lb 11.6 oz)   19 -- -- -- -- 54 23 98 % -- --   19 -- -- -- -- 92 12 100 % -- --   19 -- -- -- 121 138 23 96 % -- --   19 1845 (!) 102/92 -- -- 114 131 (!) 34 100 % -- --   19 1830 (!) 182/128 -- -- 121 130 (!) 36 92 % -- --   19 1815 (!) 159/153 -- -- 129 123 19 95 % -- --   19 1800 (!) 177/141 -- -- 124 130 (!) 35 (!) 81 % -- --   19 1745 (!) 164/141 -- -- 131 133 15 94 % -- --   19 1730 (!) 152/117 -- -- 124 133 26 96 % -- --   19 1715 (!) 157/130 -- -- 125 121 25 -- -- --   19 170 -- -- -- -- 133 20 92 % -- --   19 1700 (!) 165/131 -- -- 134 135 18 -- -- --   19 1638 -- -- -- -- 111 29 -- -- --   19 1630 (!) 144/111 -- -- 138 124 25 -- -- --   19 1600 (!) 165/125 -- -- 140 122 20 -- -- --   19 1545 (!) 147/120 -- -- 124 120 8 97 % -- --   19 1516 -- -- -- -- 133 24 98 % -- --   19 1515 (!) 150/122 -- -- 122 137 24 90 % -- --   19 1400 (!) 146/121 -- -- 133 142 24 100 % -- --   19 1315 -- -- -- -- 137 30 100 % -- --   19 1300 110/90 -- -- 140 105 16 95 % -- --   19 1245 (!) 142/120 -- -- 140 117 20 96 % -- --   19 1224 (!) 133/103 98.1  F (36.7  C) Oral 123 -- 18 98 % -- --       Temperatures:  Current - Temp: 97.8  F (36.6  C); Max - Temp  Av.9  F (36.6  C)  Min: 97.5  F (36.4  C)  Max: 98.2  F (36.8  C)  Respiration range: Resp  Av.7  Min: 8  Max: 39  Pulse range: Pulse  Av.2  Min: 44  Max: 140  Blood pressure range: Systolic (24hrs), Av , Min:92 , Max:182   ; Diastolic (24hrs), Av, Min:65, Max:153    Pulse oximetry range: SpO2  Av.8 %  Min: 81 " %  Max: 100 %  I/O last 3 completed shifts:  In: 255.33 [P.O.:225; I.V.:30.33]  Out: 300 [Urine:300]    Intake/Output Summary (Last 24 hours) at 4/14/2019 0912  Last data filed at 4/14/2019 0400  Gross per 24 hour   Intake 255.33 ml   Output 300 ml   Net -44.67 ml     EXAM:  General: awake and alert, NAD, oriented x 2 to person and place, did not know date or month  Head: normocephalic  Neck: unremarkable, no lymphadenopathy   HEENT: oropharynx pink and moist    Heart: irregular rhythm, mildly elevated to normal rate, no murmurs, rubs, or gallops  Lungs: clear to auscultation bilaterally with good air movement throughout  Abdomen: soft, non-tender, no masses or organomegaly  Extremities: no edema in lower extremities   Skin unremarkable.               Data:     Results for orders placed or performed during the hospital encounter of 04/13/19 (from the past 24 hour(s))   UA reflex to Microscopic   Result Value Ref Range    Color Urine Yellow     Appearance Urine Clear     Glucose Urine Negative NEG^Negative mg/dL    Bilirubin Urine Negative NEG^Negative    Ketones Urine Negative NEG^Negative mg/dL    Specific Gravity Urine 1.020 1.003 - 1.035    Blood Urine Negative NEG^Negative    pH Urine 6.0 5.0 - 7.0 pH    Protein Albumin Urine 30 (A) NEG^Negative mg/dL    Urobilinogen mg/dL 4.0 (H) 0.0 - 2.0 mg/dL    Nitrite Urine Negative NEG^Negative    Leukocyte Esterase Urine Negative NEG^Negative    Source Catheterized Urine     RBC Urine 3 (H) 0 - 2 /HPF    WBC Urine 2 0 - 5 /HPF   Chest XR,  PA & LAT    Narrative    XR CHEST TWO VIEWS   4/13/2019 1:41 PM     HISTORY: Unwitnessed fall out of bed, tachypnea. Evaluate for focal  infiltrate versus rib fracture versus other acute cardiothoracic  process.    COMPARISON: 3/6/2019.      Impression    IMPRESSION: Interstitial markings most prominent in the upper lobes  are somewhat more prominent than on the prior study which may be a  combination of baseline fibrosis and perhaps  some mild interstitial  edema related to congestive heart failure. Heart size is upper-normal  to mildly enlarged. No pleural fluid or focal infiltrate.    ADONIS ECKERT MD   Radius/Ulna XR,  PA &LAT, left    Narrative    XR LEFT FOREARM TWO VIEWS   4/13/2019 1:42 PM     HISTORY: A witnessed fall at care facility. Left forearm pain.  Evaluate for bony process due to trauma.    COMPARISON: None.      Impression    IMPRESSION: Old ulnar styloid avulsion fracture. No evidence of acute  fracture or subluxation. No evidence of elbow joint effusion. Mild  radiocarpal degenerative changes.    ADONIS ECKERT MD   Humerus XR,  G/E 2 views, left    Narrative    XR LEFT HUMERUS TWO OR MORE VIEWS   4/13/2019 1:42 PM     HISTORY: Unwitnessed fall at care facility. Left arm pain. Evaluate  for bony process due to trauma.    COMPARISON: None.      Impression    IMPRESSION: No evidence of acute fracture or subluxation.    ADONIS ECKERT MD   Head CT w/o contrast    Narrative    CT SCAN OF THE HEAD WITHOUT CONTRAST   4/13/2019 1:48 PM     HISTORY: Altered level of consciousness (LOC), unexplained; ; A  witnessed fall out of bed, cognitive impairments.  On Eliquis.   Evaluate for acute intracranial process due to trauma post fall    TECHNIQUE:  Axial images of the head and coronal reformations without  IV contrast material. Radiation dose for this scan was reduced using  automated exposure control, adjustment of the mA and/or kV according  to patient size, or iterative reconstruction technique.    COMPARISON: None.    FINDINGS:     Intracranial contents: There is diffuse parenchymal volume loss.   White matter changes are present in the cerebral hemispheres that are  consistent with small vessel ischemic disease in this age patient.  There is a chronic area of encephalomalacia in the left occipital  lobe. There is no evidence of intracranial hemorrhage, mass, acute  infarct or anomaly.    Visualized orbits/sinuses/mastoids:  The  visualized portions of the  sinuses and mastoids appear normal.    Osseous structures/soft tissues:  No intracranial hemorrhage or skull  fractures.      Impression    IMPRESSION:   1. No intracranial hemorrhage or skull fractures.  2. Chronic encephalomalacia in the left occipital lobe.  3. There is diffuse parenchymal volume loss.  White matter changes are  present in the cerebral hemispheres that are consistent with small  vessel ischemic disease in this age patient.      NIDIA MCKEON MD   Cervical spine CT w/o contrast    Narrative    CT CERVICAL SPINE WITHOUT CONTRAST   4/13/2019 1:48 PM     HISTORY: Unwitnessed fall at care facility.  Evaluate for acute bony  process due to trauma., Unwitnessed fall at care facility.  Evaluate  for acute bony process due to trauma.     TECHNIQUE: Axial images of the cervical spine were obtained without  intravenous contrast. Multiplanar reformations were performed.   Radiation dose for this scan was reduced using automated exposure  control, adjustment of the mA and/or kV according to patient size, or  iterative reconstruction technique.    COMPARISON: None.    FINDINGS: There is no evidence of fracture. Alignment is normal.      Craniocervical junction: Normal.     C1-C2:  Normal.     C2-C3:  There is ankylosis of the right facet joint. The neural  foramen are patent.     C3-C4:  Severe degenerative change in the left facet joint. Mild  annular bulge. Central canal and neural foramen are patent.     C4-C5:  Central canal normal. Mild degenerative change in the facet  joints.     C5-C6:  Loss of disc space height. Small central disc osteophyte  complex. Central canal normal. Mild bilateral foraminal stenosis.      C6-C7:  Normal disc, facet joints, spinal canal and neural foramina.      C7-T1:   Normal disc, facet joints, spinal canal and neural foramina.      Impression    IMPRESSION:    1. Negative for fracture.  2. Multilevel arthritic change.    NIDIA MCKEON MD   CBC with  platelets differential   Result Value Ref Range    WBC 7.7 4.0 - 11.0 10e9/L    RBC Count 4.16 3.8 - 5.2 10e12/L    Hemoglobin 12.6 11.7 - 15.7 g/dL    Hematocrit 39.8 35.0 - 47.0 %    MCV 96 78 - 100 fl    MCH 30.3 26.5 - 33.0 pg    MCHC 31.7 31.5 - 36.5 g/dL    RDW 13.7 10.0 - 15.0 %    Platelet Count 243 150 - 450 10e9/L    Diff Method Automated Method     % Neutrophils 60.0 %    % Lymphocytes 27.1 %    % Monocytes 8.3 %    % Eosinophils 3.6 %    % Basophils 0.9 %    % Immature Granulocytes 0.1 %    Nucleated RBCs 0 0 /100    Absolute Neutrophil 4.6 1.6 - 8.3 10e9/L    Absolute Lymphocytes 2.1 0.8 - 5.3 10e9/L    Absolute Monocytes 0.6 0.0 - 1.3 10e9/L    Absolute Eosinophils 0.3 0.0 - 0.7 10e9/L    Absolute Basophils 0.1 0.0 - 0.2 10e9/L    Abs Immature Granulocytes 0.0 0 - 0.4 10e9/L    Absolute Nucleated RBC 0.0    Basic metabolic panel   Result Value Ref Range    Sodium 140 133 - 144 mmol/L    Potassium 4.7 3.4 - 5.3 mmol/L    Chloride 110 (H) 94 - 109 mmol/L    Carbon Dioxide 25 20 - 32 mmol/L    Anion Gap 5 3 - 14 mmol/L    Glucose 96 70 - 99 mg/dL    Urea Nitrogen 27 7 - 30 mg/dL    Creatinine 0.92 0.52 - 1.04 mg/dL    GFR Estimate 61 >60 mL/min/[1.73_m2]    GFR Estimate If Black 71 >60 mL/min/[1.73_m2]    Calcium 8.3 (L) 8.5 - 10.1 mg/dL   Troponin I   Result Value Ref Range    Troponin I ES 0.050 (H) 0.000 - 0.045 ug/L   Magnesium   Result Value Ref Range    Magnesium 2.1 1.6 - 2.3 mg/dL   Nt probnp inpatient   Result Value Ref Range    N-Terminal Pro BNP Inpatient 8,013 (H) 0 - 900 pg/mL   Troponin I   Result Value Ref Range    Troponin I ES 0.037 0.000 - 0.045 ug/L     All cardiac studies reviewed by me.      Attestation:  I have reviewed today's vital signs, notes, medications, labs and imaging.  Amount of time spent in direct patient care: 30 minutes.     Jordy Sanchez MD, MD

## 2019-04-14 NOTE — PLAN OF CARE
PT- pt ambulated in the flores 100 ft with hand held assist, her HR was 95-98 after walking, did lose balance when stood up without anything to hang onto, unsure if pt uses a walker at baseline but does not at least hand held assist, recommend home PT safety eval to see how she moves in her home environment to help assess why she is falling and what/if any recommendations to help keep her from falling, likely has escorts for all walking, if does not would recommend that at this time. No further inpatient skilled PT needs, recommend pt cont to walk with nursing.  Discharge Planner PT   Patient plan for discharge: did not state  Current status: walks with assist in the flores, HR 95-98 after walking, reports she feels ok after walking, independent with bed mobility and transfers if has something to hold onto  Barriers to return to prior living situation: none anticipated as long as she can have assist for mobility  Recommendations for discharge: home PT safety eval to assess for falls in her home  Rationale for recommendations: frequent falls       Entered by: Olya Grimes 04/14/2019 1:11 PM      Physical Therapy Discharge Summary    Reason for therapy discharge:    No further expectations of functional progress.    Progress towards therapy goal(s). See goals on Care Plan in [facility] electronic health record for goal details.  Evaluation only     Therapy recommendation(s):    Continued therapy is recommended.  Rationale/Recommendations:  recommend home PT safety eval.

## 2019-04-14 NOTE — PLAN OF CARE
Slept well thru the night.  Denied pain when up mid shift to void.  With activity HR does go up to mid 110's but settles with rest.  Cardizem drip off since about 0020.  VS stable but DBP is climbing again.  Continue to monitor, plan of care continues.

## 2019-04-14 NOTE — ED NOTES
Patient has made multiple attempts to get OOB on her own, bed alarm on. She has been stating she feels SHORTNESS OF AIR so O2 applied at 3-4 L/NC. She is very restless.

## 2019-04-14 NOTE — PROGRESS NOTES
Complains of pain to arm so medicated with [place] one tablet at HS along with additional Catepress dose for consistent DBP over 100 for past hour.  SBP and DBP has been lower now and resting quietly.  Cardizem drip continues at 5 mg/ hour although have noted some pauses that are lengthy still remains in Atrial Fib. With vent. Response in 50's to 100.  Will monitor for another hour and may half or even turn off.  Plan of care continues.

## 2019-04-15 PROBLEM — N17.9 ACUTE RENAL INJURY DUE TO HYPOVOLEMIA (H): Status: ACTIVE | Noted: 2019-01-01

## 2019-04-15 PROBLEM — D72.823 LEUKEMOID REACTION: Status: ACTIVE | Noted: 2019-01-01

## 2019-04-15 PROBLEM — E86.1 ACUTE RENAL INJURY DUE TO HYPOVOLEMIA (H): Status: ACTIVE | Noted: 2019-01-01

## 2019-04-15 NOTE — PROGRESS NOTES
Updated Provider Vicky LANCASTER about status at this time.  diaphoretic , sat bolt upright in bed and had small emesis of clear liquid mucusy phelgm about 30 ml.  HR continues to be high 20's.  Last BP 89/63.  Continue to monitor.

## 2019-04-15 NOTE — PROGRESS NOTES
[facility]    Hospital Medicine   Cross Cover Note  Date of Service: 4/15/2019     I was called due to bradycardia and low pressures.    Patient presented in atrial fibrillation with RVR, having a difficult time controlling rate earlier in the day. Day team adjusted oral rate controlling medications - last had metoprolol 37.5 mg am of 4/14, no additional diltiazem. In the evening patient became bradycardic and hypotensive. Per Dr. Jordy Sanchez's note, patient was previously adamant that she did not want a pacemaker.    Suspect medication adjustments are now causing the bradycardia. Patient was initially asymptomatic.    Discussed with Dr. Jemal Pickens, who stated if patient was asymptomatic, allow for medications to metabolize from patient's system and observe. Suggested getting an EKG but patient refused.    Patient later was vomiting and diaphoretic, now symptomatic with rates persisting in 20-30's.     Discussed with Intensivists at [facility] who recommended trying a dopamine infusion to help with both heart rate and pressures.  PLAN:  - RN to place larger IV  - Dopamine infusion ordered  - If patient still not doing well, discuss with Dr. Pickens further, and consider calling family    Vicky Shi PA-C  [facility] Hospitalist Service  Pager: [phone]

## 2019-04-15 NOTE — PROGRESS NOTES
Able to place 18 G IV to right lower FA and Dopamine started.  Continues to have nausea and now complains of back pain, repeatedly asked if pain in chest, denies when asked.  Another emesis of 30 ml clear mucus phlegm.  Did medicate with Zofran IV 4 mg, some relief and resting.

## 2019-04-15 NOTE — PROGRESS NOTES
"[facility] Medicine   Cross Cover Note  Date of Service: 4/15/2019     I was called by MAICOL Shi as noted.  Per RN, patient was complaining of chest pain but has since denied. For me complains of feeling \"too sick to care\". Does not want to answer more specific questions.   Awake, responsive, makes brief eye contact, pale and appears irritable but otherwise NAD and nontoxic. CV irregular without murmur and with good wrist pulses. Lungs clear to auscultation. Abdomen soft and non-tender. Skin warm and dry.     HR 32-34 on dopamine 4. /71.    ECG: atrial fibrillation with bradycardia, old anterior MI, inferior twave flattening and inversions and lateral 1-1.5 ST depression but essentially unchanged from prior ECGs except for the bradycardia.      A/P  Bradycardia, symptomatic. Patient not wanting intervention (pacer). Given her frustration with things, I wonder if she was being compliant with meds PTA. Expect bradycardia will resolve as meds wear off.    - titrate dopamine to HR mid-50's at minimum if not 60's, or until patient feeling better   - holding further CCB or BB for now  20 minutes critical care  Jemal Pickens MD  Hospital Medicine   "

## 2019-04-15 NOTE — PROGRESS NOTES
BP is improved with bolus of fluids, last reading 90/61.  O2 is at 3 LPM NC and 99%.    HR continues to be low, High 20's.  Just up to Commode with assist of 1 but unable to void, returned to bed. And resting again.

## 2019-04-15 NOTE — PLAN OF CARE
OT: Per discussion with OT this weekend and chart review of PT evaluation. Patient does not appear to have any acute IP OT needs. From [facility] and likely has assist with ADLs.

## 2019-04-15 NOTE — PLAN OF CARE
Overview of shift.  Mood continues to wax and wane, has had period where she is very frustrated with equipment and wires and removes pulse oximetry.  Does Not use call light at all.  Bed alarm on at all times. Behavior goal oriented get up to void, turn over in bed.  Attemptted to remove t-shirt she has had on since admission.  Very attached to such.  Angry and swearing and in few moments can be laughing.  No further nausea has been taking sips of juice since awakened at 0545 when up to commode.  Has voided 225 and 125 ml since then.  Denies any pain when questioned.  At present is side lying on right side and last Dopomine drip setting is 5 mcg/ kg/ minute with last BP 80/65 and HR consistently 55 and above.  At highest drip was at 8 mcg/ kg/ minutes.  Continue to monitor closely.

## 2019-04-15 NOTE — PROGRESS NOTES
Dr. Pickens here and orders for Dopamine parameters clarified , titrate for HR greater than 40 beats per minute.  Did increase to 6 mcg/kg/minutes while MD in room and increased again now at 0315 to 8 mcg/kg/minute.  Continue to monitor.

## 2019-04-15 NOTE — PROGRESS NOTES
Attempted to get 12 Lead EKG per ordered request but patient now belligerent and yelling at RT staff, doesn't like light on in room, kicking at RT staff person and this nurse.  Advised Provider Vicky LANCASTER and will hold for now.

## 2019-04-15 NOTE — PROGRESS NOTES
Did increase Dopomine drip back to 6 mcg/ kg/ minute while side-lying on right, last hr trending only 45-52.  Continue to monitor closely.

## 2019-04-15 NOTE — PROGRESS NOTES
Took call from Tele- ICU RN with concerns about status.  Patient has received no further anti arhythmia medications.  Was nauseated earlier and was given Zofran for such.  Checked with her X 2 since then and refusing all evening medications.  Continue to monitor.

## 2019-04-15 NOTE — PLAN OF CARE
VSS this shift (diastolic BP elevated but due to patient moving and resisting BPs when cuff is inflating otherwise when coached to hold arm still and relax it is WNL).  Dopamine drip stopped and HR consistently 60s-80s, in A-Fib but rate-controlled now.  Up voiding often, no appetite (refused lunch), has been calmer and cooperative this afternoon/evening but does get easily frustrated, especially when she wants something but can't find the right words to tell you what.  IVs x2 both SL'd.

## 2019-04-15 NOTE — PROGRESS NOTES
[facility]    Sepsis Evaluation Progress Note    Date of Service: 04/15/2019    I was called to see Citlalli Villarreal due to abnormal vital signs triggering the Sepsis SIRS screening alert. She is not known to have an infection.     Physical Exam    Vital Signs:  Temp: 97.3  F (36.3  C) Temp src: Oral BP: (!) 126/93 Pulse: 55 Heart Rate: 67 Resp: 14 SpO2: 97 % O2 Device: None (Room air) Oxygen Delivery: 3 LPM    Lab:  Lactate for Sepsis Protocol   Date Value Ref Range Status   04/15/2019 3.1 (H) 0.7 - 2.0 mmol/L Final     Comment:     Significant value called to and read back by  MARSHA GRIFFITH RN. 4/15/19 AT 1156 AS         The patient is at baseline mental status.    The rest of their physical exam is significant for severe bradycardia, currently being treated with dopamine gtts.    Assessment and Plan    The SIRS and exam findings are likely due to bradycardia as a result of tachy/mynor syndrome and the use of negative chronotropes for treatment of a-fib., there is no sign of sepsis at this time.    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    Bill Fields MD

## 2019-04-15 NOTE — PROGRESS NOTES
Care Transitions Progress note    Reason for Follow up: This writer spoke with [facility] Phone (Main Phone:[phone] Admissions Phone:[phone] Fax: [phone]) LTC, Patient does have a bedhold and can return to the facility when she is ready for discharge.    Anticipated DC Needs: Return to [facility] Phone (Main Phone:[phone] Admissions Phone:[phone] Fax: [phone])    Next Steps: CTS to arrange transportation when ready for discharge.    Sanam Snyder RN Care Coordinator  [facility] [phone]  [facility] [phone]

## 2019-04-15 NOTE — PROGRESS NOTES
Continues to be compulsive, bedalarm on and up to bathroom with 1 assist to void.  Voided about 125 ml kira urine.  And returned to bed resting comfortably but continues to have period especially at rest when slow HR of mid 30's.  Continue to monitor.

## 2019-04-15 NOTE — PROGRESS NOTES
Updated Provider TONNY Joyce and new orders received.  Was again up to Bathroom with assist of one to void.  Denies dizziness but states very tired.  IV bolus to be given over 1 hour.  HR continues to be low high 20's and 30's.  At times unable to get Accurate BP as patient is side lying with arm bent.  Mood waxs and wanes from pleasant to angry combative at times.  At present is cooperative but must keep tone calm and take things slowly.  Agreed to hold medications for evening as patient has refused every time offered.  Continue to monitor.

## 2019-04-15 NOTE — PROGRESS NOTES
"[facility] Hospitalist Progress Note           Assessment and Plan:       Rapid atrial fibrillation (H)/  History of Embolic stroke due to atrial fibrillation (H):   4/13/19 -- Patient with a history of A. Fib.  Recently started on Eliquis during 3/20/2019 admission after she had developed expressive aphasia likely secondary to embolic stroke due to A. fib.  Currently on diltiazem and metoprolol for rate control but admitted with A. fib with RVR.  Unable to rate control with metoprolol in the ICU so diltiazem drip started.   -Admit to inpatient status in ICU  -Continue IV diltiazem and home oral metoprolol for rate control  -Continue anticoagulation with Eliquis  -Troponin just slightly above normal likely demand, will check serial troponins.  4/14/2019 -- actually became bradycardic overnight so diltiazem drip was stopped and patient received her home metoprolol and diltiazem this AM.  Heart rate better but still running intermittently high with this - seems mostly in 110's, but ranging from .  Blood pressure stable.  We'll try adding an extra 12.5mg metoprolol now and increasing her pm dose to 37.5.  UPDATE: heart rate running mostly in the 40-50's, occasionally down into 20-30's when asleep since increased dose of metoprolol.  Currently mostly around 50.  Patient asymptomatic, blood pressure stable.  Plan to hole metoprolol dose tonight, resume home dosing in the AM with metoprolol 25 and will replace diltiazem with equivalent short acting dosage.  If unable to control rate with this would be a candidate for pacer, but discussed with patient today and she says she does not want a pacemaker placed and that if we can't get her heart rate under control she'd rather just \"let things take their course\".    4/15/19 -- Persistent bradycardia overnight, HR as low as 20's.  Dopamine gtts started over night to keep HR > 55, currently 6 mcg/kg/min.  BP low normal at this rate.  Dementia makes history difficult, " but she denies associated chest pain, weakness, lightheadedness.  Last dose of diltiazem was almost exactly 24 hours ago; ER form given so washout should occur soon.  Most recent metoprolol given 4/13/19.    - Continue dopamine gtts for target HR > 55.  - If diltiazem is the cause of bradycardia, we should see HR improvement soon as most recent diltiazem ER dose given 24 hour ago.  - Patient declines consideration of pacer as above.  - Continue apixaban.  -  Diltiazem and metoprolol are discontinued.      Fall: suspect due to atrial fibrillation with rapid ventricular response but uncertain  4/14/2019 -- unwitnessed fall in patient with dementia, found by EMS.  Recent past admissions where she had also falling and was also noted to be in A. Fib.  Physical therapy and occupational therapy to assess as rate improves.          HLD (hyperlipidemia): Continue prior to admission Lipitor       Cognitive impairment:   4/13/19 -- Known baseline dementia, with likely  delirium this admission.  Close monitoring and redirection  4/14/2019 -- appears baseline.     4/15/19 -- RN reports intermittent mild agitation with verbal behaviors, no physical behaviors.  Will observe.       Moderate major depressive disorder, unspecified whether recurrent (H):   4/13/19 -- Continue PTA Zoloft  4/14/2019 -- appears stable.         Chronic constipation:   Continue PTA MiraLAX and senna       Gastroesophageal reflux disease with esophagitis:   Continue PTA omeprazole       Essential hypertension:   4/14/2019 -- blood pressure doing good, continue home clonidine prn but increase parameters to prn for DBP > 110, metoprolol/dilt as above.    4/15/19 -- BP mildly low during more severe bradycardia, but systolic BP > 100 on dopamine gtts.  On no routine antihypertensives, clonidine available for PRN use.      Acute kidney injury - baseline GFR variable, 52 - 76 over past few months.  Admit GFR 61 --> 45 today.  No nephrotoxins per med list, no  "diuresis attempted, so may be mildly volume deplete.  -  mL bolus, repeat GFR in AM.      Leukocytosis - admit WBC 7.7 --> 14.3 today.  No fever, no clinical evidence of infection.  Could represent response to vasoactive amine Rx (dopamine).  - Repeat WBC in AM.     On prophylactic tamiflu from facility       Prophylaxis - Apixaban anticoagulation.      Disposition  Persistent bradycardia on dopamine gtts necessitates continued ICU care.            Interval History:   Responds only briefly to my questions, denies chest pain, weakness, lightheadedness, then further questions caused her to be agitated, yelling \"no, no, no\", so questioning stopped.             Review of Systems:   No additional positives or negatives obtained from this minimally verbal patient.            Medications:   Current active medications and PTA medications reviewed, see medication list for details.            Physical Exam:   Vitals were reviewed  Patient Vitals for the past 24 hrs:   BP Temp Temp src Pulse Heart Rate Resp SpO2 Weight   04/15/19 0645 100/65 -- -- -- (!) 40 9 96 % --   04/15/19 0630 (!) 80/65 -- -- 55 55 16 95 % 55.1 kg (121 lb 7.6 oz)   04/15/19 0615 121/86 -- -- 59 81 20 95 % --   04/15/19 0600 115/85 -- -- 60 68 20 95 % --   04/15/19 0545 -- -- -- -- 58 20 91 % --   04/15/19 0530 (!) 138/96 -- -- 76 64 10 93 % --   04/15/19 0515 (!) 133/93 -- -- 65 77 9 94 % --   04/15/19 0500 133/82 -- -- 70 79 11 94 % --   04/15/19 0445 128/85 -- -- 70 82 11 92 % --   04/15/19 0430 (!) 127/103 -- -- 82 66 10 93 % --   04/15/19 0415 118/84 -- -- 67 84 12 95 % --   04/15/19 0400 128/77 98.4  F (36.9  C) Axillary 63 66 8 95 % --   04/15/19 0345 116/86 -- -- 61 65 13 96 % --   04/15/19 0330 105/79 -- -- 57 (!) 49 11 96 % --   04/15/19 0315 102/71 -- -- (!) 45 (!) 39 14 96 % --   04/15/19 0300 94/65 -- -- (!) 35 (!) 35 21 (!) 80 % --   04/15/19 0245 102/85 -- -- (!) 31 (!) 28 13 97 % --   04/15/19 0230 (!) 85/53 -- -- (!) 27 (!) 28 13 97 " % --   04/15/19 0215 95/80 -- -- (!) 26 (!) 28 14 99 % --   04/15/19 0200 107/64 -- -- (!) 32 (!) 33 (!) 39 (!) 88 % --   04/15/19 0155 91/64 -- -- (!) 34 (!) 34 19 96 % --   04/15/19 0145 -- -- -- -- (!) 30 16 98 % --   04/15/19 0130 102/67 -- -- (!) 29 (!) 28 22 98 % --   04/15/19 0115 -- -- -- -- (!) 34 (!) 31 -- --   04/15/19 0100 98/50 -- -- -- (!) 32 22 99 % --   04/15/19 0045 -- -- -- -- (!) 30 19 99 % --   04/15/19 0030 (!) 89/63 -- -- (!) 27 (!) 25 13 98 % --   04/15/19 0015 -- -- -- -- (!) 30 20 99 % --   04/15/19 0000 90/61 -- -- (!) 28 (!) 28 18 95 % --   04/14/19 2315 -- -- -- -- (!) 28 12 99 % --   04/14/19 2305 102/68 -- -- (!) 30 (!) 32 18 100 % --   04/14/19 2300 102/68 -- -- (!) 36 (!) 33 22 100 % --   04/14/19 2245 -- -- -- -- (!) 37 16 -- --   04/14/19 2230 -- -- -- -- (!) 34 22 99 % --   04/14/19 2215 -- -- -- -- (!) 31 13 98 % --   04/14/19 2205 103/65 -- -- (!) 30 (!) 33 17 100 % --   04/14/19 2200 -- -- -- (!) 39 (!) 40 16 -- --   04/14/19 2145 (!) 79/66 -- -- (!) 43 (!) 33 21 99 % --   04/14/19 2140 (!) 68/47 -- -- (!) 29 (!) 38 9 95 % --   04/14/19 2130 -- -- -- -- (!) 46 18 (!) 86 % --   04/14/19 2115 -- -- -- -- (!) 31 11 92 % --   04/14/19 2100 (!) 85/57 -- -- (!) 32 58 22 98 % --   04/14/19 2045 -- -- -- -- (!) 42 25 98 % --   04/14/19 2030 -- -- -- -- (!) 48 26 91 % --   04/14/19 2000 111/56 98.2  F (36.8  C) Oral -- (!) 48 16 98 % --   04/14/19 1830 -- -- -- 56 -- -- 99 % --   04/14/19 1810 -- -- -- -- -- -- 96 % --   04/14/19 1730 117/88 -- -- 69 -- 16 99 % --   04/14/19 1643 -- -- -- -- (!) 46 -- -- --   04/14/19 1642 -- -- -- -- 52 -- -- --   04/14/19 1640 -- -- -- -- (!) 34 -- -- --   04/14/19 1630 112/79 96.3  F (35.7  C) Axillary -- 55 15 99 % --   04/14/19 1515 -- -- -- -- (!) 49 -- -- --   04/14/19 1513 -- -- -- -- (!) 45 -- -- --   04/14/19 1504 99/74 96.4  F (35.8  C) Axillary 52 -- 18 98 % --   04/14/19 1131 -- -- -- -- 103 -- -- --   04/14/19 1103 94/72 -- -- -- 93 18 92 %  --   19 1100 94/72 97.5  F (36.4  C) Oral -- 96 18 -- --   19 0919 106/72 97.6  F (36.4  C) Oral -- 109 13 96 % --   19 0800 (!) 126/99 -- -- 105 82 13 97 % --       Temperatures:  Current - Temp: 97.8  F (36.6  C); Max - Temp  Av.9  F (36.6  C)  Min: 97.5  F (36.4  C)  Max: 98.2  F (36.8  C)  Respiration range: Resp  Av.7  Min: 8  Max: 39  Pulse range: Pulse  Av.2  Min: 44  Max: 140  Blood pressure range: Systolic (24hrs), Av , Min:92 , Max:182   ; Diastolic (24hrs), Av, Min:65, Max:153    Pulse oximetry range: SpO2  Av.8 %  Min: 81 %  Max: 100 %  I/O last 3 completed shifts:  In: 1650 [P.O.:350; I.V.:300; IV Piggyback:1000]  Out: 725 [Urine:675; Emesis/NG output:50]      Intake/Output Summary (Last 24 hours) at 4/15/2019 0743  Last data filed at 4/15/2019 0615  Gross per 24 hour   Intake 1650 ml   Output 725 ml   Net 925 ml         GENERAL: Slender, frail-appearing woman, looks comfortable.  EYES: Eyes grossly normal to inspection, extraocular movements intact  HENT: Nares patent bilaterally.  Nasal mucosa normal, no discharge.    NECK: Trachea midline, no stridor.    RESP: No accessory muscle use.  Symmetrical breath sounds.  Lungs clear throughout on inspiration and expiration.  Expiration not prolonged, no wheeze.  CV: Irregularly irregular, bradycardic.  Normal S1 S2, no murmur heard, no extra sound.  No lower extremity edema.  ABDOMEN: Soft, non-tender, no guarding.  Liver and spleen not enlarged, no masses palpable.  Bowel sounds positive.  MS: No bony deformities noted.  No red or inflamed joints.  SKIN: Warm and dry, no rashes.  NEURO: Alert, only briefly conversant.  Cranial nerves II - XII grossly intact.  No gross motor or sensory deficits.   PSYCH: Calm, alert, briefly conversant, so exam limited.  Does not appear anxious.              Data:     Results for orders placed or performed during the hospital encounter of 19 (from the past 24 hour(s))    Troponin I   Result Value Ref Range    Troponin I ES 0.035 0.000 - 0.045 ug/L   Care Transition RN/SW IP Consult    Narrative    Rosanne Combs RN     4/14/2019 12:17 PM  Care Transition Initial Assessment - RN      Spoke with daughter.    DATA   Principal Problem:    Rapid atrial fibrillation (H)  Active Problems:    HLD (hyperlipidemia)    Cognitive impairment    Current moderate episode of major depressive disorder,   unspecified whether recurrent (H)    Chronic constipation    Gastroesophageal reflux disease with esophagitis    Embolic stroke due to atrial fibrillation (H)    Essential hypertension       Primary Care Clinic Name: [facility]  Primary Care MD Name: Natasha Diaz  Contact information and PCP information verified: Yes    ASSESSMENTCognitive Status: confused.       Resources List: Skilled Nursing Facility     Description of Support System: Supportive, Involved   Who is your support system?: Children, Facility resident(s)/Staff     Support Assessment: Adequate family and caregiver support   Insurance Concerns: No Insurance issues identified      This writer spoke with daughter Susan as the patient is   confused, introduced self and role. The patient lives at   [facility] Phone (Main Phone:[phone]   Admissions Phone:[phone] Fax: [phone]). Referral placed   for [facility]. The patient will return to [facility] upon   discharge. Unable to confirm bed hold over weekend. Care   Transitions will arrange transportation upon discharge.    PLAN    Return to [facility]      Rosanne Combs RN, Care Coordinator [phone]       CBC with platelets   Result Value Ref Range    WBC 14.3 (H) 4.0 - 11.0 10e9/L    RBC Count 4.83 3.8 - 5.2 10e12/L    Hemoglobin 14.3 11.7 - 15.7 g/dL    Hematocrit 47.9 (H) 35.0 - 47.0 %    MCV 99 78 - 100 fl    MCH 29.6 26.5 - 33.0 pg    MCHC 29.9 (L) 31.5 - 36.5 g/dL    RDW 13.7 10.0 - 15.0 %    Platelet Count 288 150 - 450 10e9/L   Basic  metabolic panel   Result Value Ref Range    Sodium 138 133 - 144 mmol/L    Potassium 5.4 (H) 3.4 - 5.3 mmol/L    Chloride 107 94 - 109 mmol/L    Carbon Dioxide 19 (L) 20 - 32 mmol/L    Anion Gap 12 3 - 14 mmol/L    Glucose 145 (H) 70 - 99 mg/dL    Urea Nitrogen 35 (H) 7 - 30 mg/dL    Creatinine 1.19 (H) 0.52 - 1.04 mg/dL    GFR Estimate 45 (L) >60 mL/min/[1.73_m2]    GFR Estimate If Black 52 (L) >60 mL/min/[1.73_m2]    Calcium 9.0 8.5 - 10.1 mg/dL   TSH with free T4 reflex   Result Value Ref Range    TSH 4.82 (H) 0.40 - 4.00 mU/L   Troponin I   Result Value Ref Range    Troponin I ES 0.018 0.000 - 0.045 ug/L   T4 free   Result Value Ref Range    T4 Free 1.45 0.76 - 1.46 ng/dL     All cardiac studies reviewed by me.      Attestation:  I have reviewed today's vital signs, notes, medications, labs and imaging.  Critical Care time: 40 minutes.     Bill Fields MD

## 2019-04-16 NOTE — PROGRESS NOTES
[facility]           Assessment and Plan:       Rapid atrial fibrillation (H)/  History of Embolic stroke due to atrial fibrillation (H):   Patient with a history of A. Fib.  Recently started on Eliquis during 3/20/2019 admission after she had developed expressive aphasia likely secondary to embolic stroke due to A. fib.  Preadmission was on diltiazem and metoprolol for rate control, but was admitted with A. fib with RVR.  Unable to rate control with metoprolol in the ICU so diltiazem drip was started.  Preadmission diltiazem and metoprolol oral doses were resumed, after which patient became severely bradycardic.  Diltiazem PO and gtts were stopped, and metoprolol PO stopped, on 4/14/19.  Required chronotropic support with dopamine gtts for a period of about 24 hours, discontinued early morning 4/16/19.  HR since discontinuation of dopamine gtts has been 90 to 122.  I'm concerned that this HR will continue to rise off of any rate control medication, but I'm also hesitant to restart diltiazem or metoprolol given severe bradycardia to fairly modest doses.  Now that GFR has recovered, will try digitalization.  - Digoxin 0.5 mg IV now, then 0.25 mg IV in 6 hours.  - Digoxin 0.125 mg every day starting tomorrow AM.  Increase to 0.25 mg every day if HR control suboptimal.  - Patient declines any consideration of pacer per discussion with Dr. Sanchez 4/14/19.  - Continue apixaban.  - Diltiazem and metoprolol are discontinued.      Fall: suspect due to atrial fibrillation with rapid ventricular response but uncertain  Unwitnessed fall in patient with dementia, found by EMS.  Has had recent past admissions where she also fell, was also noted to be in A. Fib.  Physical therapy and occupational therapy to assess as rate improves.          HLD (hyperlipidemia): Continue prior to admission Lipitor       Cognitive impairment:   Has known baseline dementia, with some mild agitated delirium this  "admission.  RN reports intermittent mild agitation with verbal behaviors, no physical behaviors.  - Will observe.       Moderate major depressive disorder, unspecified whether recurrent (H):   Preadmission was on sertraline, which has continued.  Dementia makes depression assessment difficult.  - Continue sertraline.        Chronic constipation:   Continue preadmission MiraLAX and Senna.       Gastroesophageal reflux disease with esophagitis:   Continue PTA omeprazole       Essential hypertension:   Starting to see some diastolic hypertension now that preadmission diltiazem and metoprolol have been stopped.  Was on clonidine 0.1 mg BID preadmission, which was held during bradycardia as patient was also hypotensive.   - Resume preadmission clonidine 0.1 mg BID, titrate up as needed.  - Preadmission diltiazem and metoprolol remain held given problems with severe bradycardia here; see above.      Acute kidney injury due to hypovolemia - baseline GFR variable, 52 - 76 over past few months.  Admit GFR 61 --> 45 on 4/15/19..  No nephrotoxins per med list, no diuresis attempted, so was felt to be mildly volume deplete.  Was given and additional  mL bolus 4/15/19, repeat GFR today 66.  - GFR back to baseline.  Follow.      Leukocytosis - admit WBC 7.7 --> 14.3 --> 8.4 today.  No fever, no clinical evidence of infection.  Probably represented response to vasoactive amine Rx (dopamine), which is now stopped.  - Resolved.     On prophylactic tamiflu from facility       Prophylaxis - Apixaban anticoagulation.      Disposition  Although bradycardia is resolved, it is possible that tachycardia could require continuous IV therapy for control, so will keep her here in the ICU at least until dig loaded.            Interval History:   Was initially calm and cooperative, said she felt \"great\".  When told she wasn't well enough for discharge today, she advised us that we're \"all bitches\".  No response to symptom questions.        "     Review of Systems:   No additional positives or negatives obtained due to dementia.            Medications:   Current active medications and PTA medications reviewed, see medication list for details.            Physical Exam:   Vitals were reviewed  Patient Vitals for the past 24 hrs:   BP Temp Temp src Pulse Heart Rate Resp SpO2   04/16/19 0730 -- -- -- -- 112 (!) 33 94 %   04/16/19 0715 -- -- -- -- 112 13 94 %   04/16/19 0700 (!) 122/94 -- -- 118 104 16 95 %   04/16/19 0645 -- -- -- -- 154 27 100 %   04/16/19 0630 -- -- -- -- 89 14 92 %   04/16/19 0615 -- -- -- -- 111 11 (!) 89 %   04/16/19 0430 -- -- -- -- 109 19 91 %   04/16/19 0415 -- -- -- -- 137 14 --   04/16/19 0400 (!) 124/96 97.5  F (36.4  C) Oral 112 96 19 95 %   04/16/19 0345 -- -- -- -- 121 20 --   04/16/19 0330 -- -- -- -- 99 (!) 85 --   04/16/19 0315 -- -- -- -- 80 (!) 82 --   04/16/19 0300 115/87 -- -- 90 101 22 --   04/16/19 0245 -- -- -- -- 112 22 93 %   04/16/19 0230 92/57 -- -- -- 93 13 --   04/16/19 0215 -- -- -- -- 113 16 --   04/16/19 0200 -- -- -- 101 114 12 --   04/16/19 0145 -- -- -- -- 108 13 --   04/16/19 0130 -- -- -- -- 84 13 --   04/16/19 0125 -- -- -- -- 106 11 --   04/16/19 0120 -- -- -- -- 98 13 --   04/16/19 0115 -- -- -- -- 82 11 --   04/16/19 0110 -- -- -- -- 107 24 --   04/16/19 0105 (!) 110/101 -- -- 114 122 13 93 %   04/16/19 0100 (!) 73/60 -- -- 93 96 13 91 %   04/16/19 0055 109/83 -- -- 99 110 20 92 %   04/16/19 0045 -- -- -- -- 99 11 93 %   04/16/19 0030 -- -- -- -- 103 14 92 %   04/16/19 0015 -- -- -- -- 93 13 92 %   04/16/19 0005 -- -- -- -- 88 -- --   04/16/19 0000 -- -- -- 96 86 14 92 %   04/15/19 2345 -- -- -- -- 101 13 93 %   04/15/19 2330 -- -- -- -- 92 13 92 %   04/15/19 2315 -- -- -- -- 106 14 92 %   04/15/19 2300 (!) 82/41 -- -- 112 76 9 94 %   04/15/19 2245 -- -- -- -- 73 13 90 %   04/15/19 2230 97/75 -- -- 82 116 18 93 %   04/15/19 2215 -- -- -- -- 123 (!) 33 93 %   04/15/19 2200 (!) 72/62 -- -- 107 92 23 --    04/15/19 2145 (!) 131/97 -- -- -- 128 22 --   04/15/19 2130 -- -- -- -- 102 16 --   04/15/19 2115 -- -- -- -- 91 25 --   04/15/19 2100 -- -- -- 90 98 22 --   04/15/19 2045 -- -- -- -- 112 23 --   04/15/19 2039 -- 97.3  F (36.3  C) Oral -- -- -- --   04/15/19 2030 -- -- -- -- 140 20 --   04/15/19 2015 -- -- -- -- 96 20 --   04/15/19 2005 -- -- -- -- 97 -- --   04/15/19 2000 (!) 131/100 -- -- 81 83 21 (!) 82 %   04/15/19 1945 -- -- -- -- 101 19 100 %   04/15/19 1930 (!) 130/95 98.2  F (36.8  C) Oral -- 89 13 100 %   04/15/19 1915 -- -- -- -- 90 18 --   04/15/19 1900 135/81 -- -- 102 123 24 93 %   04/15/19 1700 129/78 -- -- 97 110 26 95 %   04/15/19 1600 (!) 118/97 -- -- 76 102 (!) 31 --   04/15/19 1524 -- 97.3  F (36.3  C) Oral -- -- -- --   04/15/19 1500 (!) 117/91 -- -- 83 117 -- 93 %   04/15/19 1400 101/79 -- -- 62 63 -- --   04/15/19 1300 (!) 127/104 -- -- 112 72 -- 97 %   04/15/19 1200 (!) 115/98 -- -- 105 90 20 --   04/15/19 1151 -- 97.3  F (36.3  C) Oral -- -- -- --   04/15/19 1100 (!) 126/93 -- -- 55 67 14 97 %   04/15/19 1015 (!) 128/104 -- -- 107 86 18 --   04/15/19 1000 (!) 116/99 -- -- 90 121 12 --   04/15/19 0945 118/85 -- -- 77 82 (!) 43 --   04/15/19 0930 -- -- -- -- -- -- 98 %   04/15/19 0915 118/85 -- -- 74 75 20 97 %   04/15/19 0900 120/77 -- -- 73 64 16 97 %   04/15/19 0845 115/82 -- -- 71 69 15 --       Temperatures:  Current - Temp: 97.8  F (36.6  C); Max - Temp  Av.9  F (36.6  C)  Min: 97.5  F (36.4  C)  Max: 98.2  F (36.8  C)  Respiration range: Resp  Av.7  Min: 8  Max: 39  Pulse range: Pulse  Av.2  Min: 44  Max: 140  Blood pressure range: Systolic (24hrs), Av , Min:92 , Max:182   ; Diastolic (24hrs), Av, Min:65, Max:153    Pulse oximetry range: SpO2  Av.8 %  Min: 81 %  Max: 100 %  I/O last 3 completed shifts:  In: 1486.47 [P.O.:410; I.V.:576.47; IV Piggyback:500]  Out: 200 [Urine:200]        Intake/Output Summary (Last 24 hours) at 2019 0911  Last data filed  at 4/16/2019 0100  Gross per 24 hour   Intake 836.47 ml   Output --   Net 836.47 ml           GENERAL: Slender, frail-appearing woman, seated in recliner, looks comfortable.  EYES: Eyes grossly normal to inspection, extraocular movements intact  HENT: Nares patent bilaterally.  Nasal mucosa normal, no discharge.    NECK: Trachea midline, no stridor.    RESP: No accessory muscle use.  Symmetrical breath sounds.  Scant crackles on inspiration at both posterior bases, lungs clear otherwise on inspiration and expiration.  Expiration not prolonged, no wheeze.  CV: Irregularly irregular, mildly tachycardic.  Normal S1 S2, no murmur heard, no extra sound.  No lower extremity edema.  ABDOMEN: Soft, non-tender, no guarding.  Liver and spleen not enlarged, no masses palpable.  Bowel sounds positive.  MS: No bony deformities noted.  No red or inflamed joints.  SKIN: Warm and dry, no rashes.  NEURO: Alert, conversant, vague in responses, repetitive when agitated.  Cranial nerves II - XII grossly intact.  No gross motor or sensory deficits.   PSYCH: Initially calm, then agitated when told she was not being discharged today.              Data:     Results for orders placed or performed during the hospital encounter of 04/13/19 (from the past 24 hour(s))   Lactic acid level STAT for sepsis protocol   Result Value Ref Range    Lactate for Sepsis Protocol 3.1 (H) 0.7 - 2.0 mmol/L   Glucose by meter   Result Value Ref Range    Glucose 134 (H) 70 - 99 mg/dL   CBC with platelets   Result Value Ref Range    WBC 8.4 4.0 - 11.0 10e9/L    RBC Count 4.20 3.8 - 5.2 10e12/L    Hemoglobin 12.6 11.7 - 15.7 g/dL    Hematocrit 40.6 35.0 - 47.0 %    MCV 97 78 - 100 fl    MCH 30.0 26.5 - 33.0 pg    MCHC 31.0 (L) 31.5 - 36.5 g/dL    RDW 13.6 10.0 - 15.0 %    Platelet Count 237 150 - 450 10e9/L   Basic metabolic panel   Result Value Ref Range    Sodium 136 133 - 144 mmol/L    Potassium 4.1 3.4 - 5.3 mmol/L    Chloride 107 94 - 109 mmol/L    Carbon  Dioxide 25 20 - 32 mmol/L    Anion Gap 4 3 - 14 mmol/L    Glucose 93 70 - 99 mg/dL    Urea Nitrogen 27 7 - 30 mg/dL    Creatinine 0.87 0.52 - 1.04 mg/dL    GFR Estimate 66 >60 mL/min/[1.73_m2]    GFR Estimate If Black 76 >60 mL/min/[1.73_m2]    Calcium 8.3 (L) 8.5 - 10.1 mg/dL         Attestation:  I have reviewed today's vital signs, notes, medications, labs and imaging.  Critical Care time: 40 minutes.     Bill Fields MD

## 2019-04-16 NOTE — PROGRESS NOTES
"Pt ate a few bites of breakfast, requested and offered several different alternative food items but ultimately declined most breakfast.  She stated she is \"not a breakfast eater\".  Pt assisted back to bed from chair per request, resting in bed now.  HR continues in the a fib pattern with rates in the 70's to 110's at rest.   "

## 2019-04-16 NOTE — PROGRESS NOTES
Pt with HR in the 60's to 70's while sleeping, as low as 57.  Pattern is a fib with some pauses.  Notified provider as digoxin was due to be given.  Med dc'd.

## 2019-04-16 NOTE — PROGRESS NOTES
Pt offered lunch and tylenol, declined in an angry tone of voice.  Will continue to let pt rest and re approach later.

## 2019-04-17 NOTE — PLAN OF CARE
Patient awoke at 5pm, walked to bathroom, took off BP cuff, ripped off the heart monitor and patches, started yelling inappropriate word, refused to wear heart monitor, patient agitated, will await until patient calms down to try to reapply the heart monitor.

## 2019-04-17 NOTE — PROGRESS NOTES
Pt is awake, walking around room, dumped cup of morning medications into the toilet. Ate 1/2  of breakfast. Mood changes moment to moment.  Refusing hygenic cares, refusing to be on the cardiac monitor. Refusing just about everything.

## 2019-04-17 NOTE — PROGRESS NOTES
Pt has been calmer this afternoon, likes the lights off and TV on.. Refused offer to assist with a shower. Pt allowed to have VS taken, but continues to refuse cardiac monitoring. Heart rate found to be 130 to 156. Irregular. Was able to talk pt into taking some of the scheduled am medications.Will continue to approach pt calmly and allow choices.

## 2019-04-17 NOTE — PROGRESS NOTES
Care Transitions Discharge:    Name: Citlalli Villarreal    MRN: [id number]    Reason for Hospitalization: Rapid atrial fibrillation (H) [I48.91]  Fall, initial encounter [W19.XXXA]  Poorly-controlled hypertension [I10]    Cognitive/Behavioral Status: awake, alert, confused and agitated    Follow-up Appointments: No future appointments.    Discharge Date:  4/17/2019    Patient/Care Partner in agreement and understands the discharge plan:  Yes    Discharge Disposition:  Return to [facility]. Transportation will be provided by [employer].    Discharge Planner   Discharge Plans in progress: LT  Barriers to discharge plan: None  Follow up plan: Follow up with PCP.       Entered by: Rosanne Combs 04/17/2019 11:37 AM       Addendum: Discharge cancelled. [facility] LTC elevator needs repair. Patient is located on 2nd floor at [place]. Patient will be able to discharge 4/18/19 late afternoon. Unable to notify daughter Judith because her voice mail box is full.         Rosanne Combs RN Care Coordinator  [phone]

## 2019-04-17 NOTE — DISCHARGE SUMMARY
[facility]    Discharge Summary  Hospital Medicine    Date of Admission:  4/13/2019  Date of Discharge:  4/17/2019   Discharging Provider: Bill Fields  Date of Service: 4/17/2019      Primary Care     Natasha Diaz  [address] [place] [id number]      Identification and Chief Compaint: Citlalli Villarreal is a 73 year old female who presented on 4/13/2019 after an unwitnessed fall.    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)    Essential hypertension    Leukocytosis    Acute renal injury due to hypovolemia (H)      Discharge Disposition   Discharged to long-term care facility    Discharge Orders   No discharge procedures on file.     Discharge Medications   Current Discharge Medication List      CONTINUE these medications which have NOT CHANGED    Details   acetaminophen (TYLENOL) 325 MG tablet Take 2 tablets (650 mg) by mouth 3 times daily    Associated Diagnoses: Pain      apixaban ANTICOAGULANT (ELIQUIS) 5 MG tablet Take 1 tablet (5 mg) by mouth 2 times daily    Associated Diagnoses: Atrial fibrillation with RVR (H)      aspirin (ASA) 81 MG tablet Take 1 tablet (81 mg) by mouth daily  Refills: OTC    Associated Diagnoses: Carotid stenosis, asymptomatic, bilateral      atorvastatin (LIPITOR) 40 MG tablet Take 20 mg by mouth every morning       cloNIDine (CATAPRES) 0.1 MG tablet Take 0.1 mg by mouth 2 times daily as needed FOR DIASTOLIC over 100  Refills: 99      diltiazem ER (TIAZAC) 360 MG 24 hr ER beaded capsule Take 360 mg by mouth daily      gabapentin (NEURONTIN) 100 MG capsule Take 100 mg by mouth At Bedtime      metoprolol tartrate (LOPRESSOR) 25 MG tablet Take 1 tablet (25 mg) by mouth 2 times  daily    Associated Diagnoses: Hypertensive heart disease without heart failure      MULTIPLE VITAMINS-MINERALS PO Take 1 tablet by mouth daily      omeprazole 20 MG tablet Take 20 mg by mouth daily      polyethylene glycol (MIRALAX/GLYCOLAX) packet Take 17 g by mouth daily      senna (SENOKOT) 8.6 MG tablet Take 1 tablet by mouth 2 times daily      sertraline (ZOLOFT) 25 MG tablet Take 50 mg by mouth daily          STOP taking these medications       oseltamivir (TAMIFLU) 75 MG capsule Comments:   Reason for Stopping:             Allergies   No Known Allergies    Consultations This Hospital Stay   Consultation during this admission received from:    PHYSICAL THERAPY ADULT IP CONSULT  OCCUPATIONAL THERAPY ADULT IP CONSULT  CARE TRANSITION RN/SW IP CONSULT    Significant Results and Procedures   Procedures    None.    Data   Results for orders placed or performed during the hospital encounter of 04/13/19   Head CT w/o contrast    Narrative    CT SCAN OF THE HEAD WITHOUT CONTRAST   4/13/2019 1:48 PM     HISTORY: Altered level of consciousness (LOC), unexplained; ; A  witnessed fall out of bed, cognitive impairments.  On Eliquis.   Evaluate for acute intracranial process due to trauma post fall    TECHNIQUE:  Axial images of the head and coronal reformations without  IV contrast material. Radiation dose for this scan was reduced using  automated exposure control, adjustment of the mA and/or kV according  to patient size, or iterative reconstruction technique.    COMPARISON: None.    FINDINGS:     Intracranial contents: There is diffuse parenchymal volume loss.   White matter changes are present in the cerebral hemispheres that are  consistent with small vessel ischemic disease in this age patient.  There is a chronic area of encephalomalacia in the left occipital  lobe. There is no evidence of intracranial hemorrhage, mass, acute  infarct or anomaly.    Visualized orbits/sinuses/mastoids:  The visualized portions of  the  sinuses and mastoids appear normal.    Osseous structures/soft tissues:  No intracranial hemorrhage or skull  fractures.      Impression    IMPRESSION:   1. No intracranial hemorrhage or skull fractures.  2. Chronic encephalomalacia in the left occipital lobe.  3. There is diffuse parenchymal volume loss.  White matter changes are  present in the cerebral hemispheres that are consistent with small  vessel ischemic disease in this age patient.      NIDIA MCKEON MD   Cervical spine CT w/o contrast    Narrative    CT CERVICAL SPINE WITHOUT CONTRAST   4/13/2019 1:48 PM     HISTORY: Unwitnessed fall at care facility.  Evaluate for acute bony  process due to trauma., Unwitnessed fall at care facility.  Evaluate  for acute bony process due to trauma.     TECHNIQUE: Axial images of the cervical spine were obtained without  intravenous contrast. Multiplanar reformations were performed.   Radiation dose for this scan was reduced using automated exposure  control, adjustment of the mA and/or kV according to patient size, or  iterative reconstruction technique.    COMPARISON: None.    FINDINGS: There is no evidence of fracture. Alignment is normal.      Craniocervical junction: Normal.     C1-C2:  Normal.     C2-C3:  There is ankylosis of the right facet joint. The neural  foramen are patent.     C3-C4:  Severe degenerative change in the left facet joint. Mild  annular bulge. Central canal and neural foramen are patent.     C4-C5:  Central canal normal. Mild degenerative change in the facet  joints.     C5-C6:  Loss of disc space height. Small central disc osteophyte  complex. Central canal normal. Mild bilateral foraminal stenosis.      C6-C7:  Normal disc, facet joints, spinal canal and neural foramina.      C7-T1:   Normal disc, facet joints, spinal canal and neural foramina.      Impression    IMPRESSION:    1. Negative for fracture.  2. Multilevel arthritic change.    NIDIA MCKEON MD   Chest XR,  PA & LAT    Narrative     "XR CHEST TWO VIEWS   4/13/2019 1:41 PM     HISTORY: Unwitnessed fall out of bed, tachypnea. Evaluate for focal  infiltrate versus rib fracture versus other acute cardiothoracic  process.    COMPARISON: 3/6/2019.      Impression    IMPRESSION: Interstitial markings most prominent in the upper lobes  are somewhat more prominent than on the prior study which may be a  combination of baseline fibrosis and perhaps some mild interstitial  edema related to congestive heart failure. Heart size is upper-normal  to mildly enlarged. No pleural fluid or focal infiltrate.    ADONIS ECKERT MD   Radius/Ulna XR,  PA &LAT, left    Narrative    XR LEFT FOREARM TWO VIEWS   4/13/2019 1:42 PM     HISTORY: A witnessed fall at care facility. Left forearm pain.  Evaluate for bony process due to trauma.    COMPARISON: None.      Impression    IMPRESSION: Old ulnar styloid avulsion fracture. No evidence of acute  fracture or subluxation. No evidence of elbow joint effusion. Mild  radiocarpal degenerative changes.    ADONIS ECKERT MD   Humerus XR,  G/E 2 views, left    Narrative    XR LEFT HUMERUS TWO OR MORE VIEWS   4/13/2019 1:42 PM     HISTORY: Unwitnessed fall at care facility. Left arm pain. Evaluate  for bony process due to trauma.    COMPARISON: None.      Impression    IMPRESSION: No evidence of acute fracture or subluxation.    ADONIS ECKERT MD       History of Present Illness   (From H&P) Patient with a history of A. fib [on rate control and anticoagulation with Eliquis], hyperlipidemia, hypertension, GERD, mood disorder amongst other medical problems as outlined below.  Patient has baseline dementia and is not  able to provide history during my evaluation.  History below as documented by ED physician:  \"EMS reports patient was found on the floor at 7:50 AM.  Fall was not witnessed.  There was concern by care staff the patient remained hypertensive despite receiving clonidine and nitroglycerin with systolic pressures ranging from " "150-170 and diastolic pressure ranging from 110-125.  Patient was also notably tachycardic consistent with rapid atrial fibrillation.  Patient is listed to be DNR/DNI per directives from her care facility dated March 19, 2019.  No interventions were administered on transport patient was reported to be a second assist with help up to the wheelchair.  Patient did not complain of any neck pain no back pain or hip pain.  She was notably tachypneic with respiratory [id number] per EMS.  Patient was seen upon arrival as a trauma evaluation for concern for fall with rapid atrial fibrillation.  Patient was unable to provide a coherent history.  She did not appear in any distress.  There was no family at the bedside to provide additional history upon ED arrival\".      Hospital Course   Citlalli Villarreal was admitted on 4/13/2019.  The following problems were addressed during her hospitalization:      Rapid atrial fibrillation (H)/ History of Embolic stroke due to atrial fibrillation (H):   Patient with a history of atrial fibrillation.  Recently started on Eliquis during 3/20/2019 admission after she had developed expressive aphasia likely secondary to embolic stroke due to a-fib.  Preadmission was on diltiazem and metoprolol for rate control, but was admitted with a-fib with RVR.  Unable to rate control with oral metoprolol in the ICU so a diltiazem drip was started.  Preadmission diltiazem and metoprolol oral doses were resumed, after which patient became severely bradycardic.  Diltiazem PO and gtts were stopped, and metoprolol PO stopped, on 4/14/19.  Required chronotropic support with dopamine gtts for a period of about 24 hours, discontinued early morning 4/16/19.  Given the observed bradycardia following metoprolol and diltiazem PO use, we decided to use digoxin for rate control.  On 4/16/19 was given digoxin 0.5 mg IV x1, then started on digoxin 0.125 mg every day this morning.  HR is variable on this Rx, from 66 to 125.  " More rapid HR seem to occur associated with periods of agitation.  Although history is difficult to obtain, it appears that she is asymptomatic even at HR in the 120's.  - Digoxin 0.125 mg every day.  Could consider increase to 0.25 mg every day if HR control suboptimal.  - Patient declines any consideration of pacer per discussion with Dr. Sanchez 4/14/19.  - Continue apixaban anticoagulation.  - Diltiazem and metoprolol are discontinued at discharge.       Fall, suspect due to atrial fibrillation with rapid ventricular response  Unwitnessed fall in patient with dementia, found by EMS.  Has had recent past admissions where she also fell, was also noted to be in a-fib.  Physical therapy and occupational therapy assessments were performed this hospital stay, recommendations include assistance with hand hold when up and around, and use of walker if not already using one in long-term care.  - Assist when ambulating with hand hold, consider providing her with walker if she doesn't have one.       HLD (hyperlipidemia): Continue prior to admission Lipitor       Cognitive impairment:   Has known baseline dementia, with some agitated delirium this admission.  RN reports intermittent periods of agitation with verbal behaviors, but no significant physical behaviors.  This may be patient's baseline.  - Observe.       Moderate major depressive disorder, unspecified whether recurrent (H):   Preadmission was on sertraline, which has continued.  Dementia makes depression assessment difficult.  - Continue sertraline.        Chronic constipation:   Continue preadmission MiraLAX and Senna.       Gastroesophageal reflux disease with esophagitis:   Continue PTA omeprazole       Essential hypertension:   Continuing to see some diastolic hypertension after discontinuation of preadmission diltiazem and metoprolol have been stopped due to bradycardia (see above).  Was on clonidine 0.1 mg BID preadmission, which we have increased to 0.2 mg  BID.  Will not make any further increase in clonidine dose presently, as measurement of BP causes her to be agitated, which probably adds to diastolic hypertension.   - Continue clonidine 0.2 mg BID, follow BP after discharge.  - Preadmission diltiazem and metoprolol have been discontinued given problems with severe bradycardia here; see above.       Acute kidney injury due to hypovolemia - baseline GFR variable, 52 - 76 over past few months.  Admit GFR 61 --> 45 on 4/15/19.  No nephrotoxins per med list, no diuresis attempted, so decline in renal function was felt to be mildly volume depletion.  Was given an additional  mL bolus 4/15/19, repeat GFR 66 after this bolus.  - GFR back to baseline.         Leukocytosis - admit WBC 7.7 --> 14.3 --> 8.4.  No fever, no clinical evidence of infection.  Probably represented response to vasoactive amine Rx (dopamine), which is now stopped.  - Resolved.      On prophylactic tamiflu from facility - I cannot find the date on which Rx was started.  Duration of oseltamivir should be at least two weeks total, and for at least one week after the last influenza case in the facility is identified.  Will need to defer eventual discontinuation of oseltamivir therapy to long-term care staff based upon these recommendations.        Pending Results   Unresulted Labs Ordered in the Past 30 Days of this Admission     Date and Time Order Name Status Description    4/15/2019 0415 T4 free In process           Physical Exam   Temp:  [97  F (36.1  C)-97.9  F (36.6  C)] 97.5  F (36.4  C)  Pulse:  [] 82  Heart Rate:  [64-93] 64  Resp:  [13-46] 15  BP: ()/() 131/105  SpO2:  [96 %-98 %] 96 %  Vitals:    04/13/19 1950 04/15/19 0630   Weight: 53.4 kg (117 lb 11.6 oz) 55.1 kg (121 lb 7.6 oz)     GENERAL: Slender, frail-appearing woman, lying in bed, looks comfortable.    RESP: No accessory muscle use.  Symmetrical breath sounds.  Scant crackles on inspiration at both posterior  bases, lungs clear otherwise on inspiration and expiration.  Expiration not prolonged, no wheeze.  CV: Irregularly irregular, non-tachycardic.  Normal S1 S2, no murmur heard, no extra sound.  No lower extremity edema.  ABDOMEN: Soft, non-tender, no guarding.  Liver and spleen not enlarged, no masses palpable.  Bowel sounds positive.  MS: No bony deformities noted.  No red or inflamed joints.  SKIN: Warm and dry, no rashes.  NEURO: Alert, conversant, vague in responses, repetitive when agitated, tells me repeatedly to get out of her room.  Cranial nerves III - XII grossly intact.  No gross motor or sensory deficits.  Gait looks fairly steady with minimal assist.  PSYCH: Initially calm, then agitated later in visit, telling me repeatedly to get out of her room.        The discharge plan was discussed with the patient and in multi-disciplinary rounds.    Total time on this discharge was 35 minutes.    Bill Fields MD

## 2019-04-18 NOTE — PROGRESS NOTES
Name: Citlalli Villarreal    MRN#: [id number]    Reason for Hospitalization: Rapid atrial fibrillation (H) [I48.91]  Fall, initial encounter [W19.XXXA]  Poorly-controlled hypertension [I10]    Discharge Date: 04/18/19    Patient/Family Response to DC Plan: in agreement, Spoke with Susan     Transportation: [facility] transport via W/C at 1415 4/18/2019    Care Coordinator Hand off completed: No    Other Providers (Care Coordinator, [facility], [facility] etc.):  No    Future Appointments : No future appointments.    Discharge Disposition: long term care facility, [facility] Phone (Main Phone:[phone] Admissions Phone:[phone] Fax: [phone])    Sanam Snyder RN Care Coordinator  [facility] [phone]  [facility] [phone]

## 2019-04-18 NOTE — PROGRESS NOTES
[facility] DISCHARGE NOTE    Patient discharged to nursing home at 2:11 PM via wheel chair. Accompanied by other:   and staff. Discharge instructions reviewed with caregiver, opportunity offered to ask questions. Prescriptions - None ordered for discharge. All belongings sent with patient.    Marie Rosas

## 2019-04-18 NOTE — PROGRESS NOTES
Pt pocketed meds in her cheek, went into the bathroom and spit them into the sink. Was trying to push them down the drain. Refusing vital signs, very aggressive today.

## 2019-04-18 NOTE — PROGRESS NOTES
Tried to talk pt into taking a shower, taking medications, eating something today. Pt is refusing everything. Becomes angry and verbally abusive when asked to do anything. Is lying in bed awake, calm currently.

## 2019-04-18 NOTE — DISCHARGE SUMMARY
[facility]    Discharge Summary  Hospital Medicine    Date of Admission:  4/13/2019  Date of Discharge:  4/18/2019   Discharging Provider: Bill Fields  Date of Service: 4/17/2019      Primary Care     Natasha Diaz  [address] [place] [id number]      Identification and Chief Compaint: Citlalli Villarreal is a 73 year old female who presented on 4/13/2019 after an unwitnessed fall.    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)    Essential hypertension    Leukocytosis    Acute renal injury due to hypovolemia (H)      Discharge Disposition   Discharged to long-term care facility    Discharge Orders      General info for SNF    Length of Stay Estimate: Long Term Care  Condition at Discharge: Stable  Level of care:skilled   Rehabilitation Potential: Fair  Admission H&P remains valid and up-to-date: Yes  Recent Chemotherapy: N/A  Use Nursing Home Standing Orders: Yes     Mantoux instructions    Give two-step Mantoux (PPD) Per Facility Policy Yes     Reason for your hospital stay    You suffered a fall at your residence, probably caused by a very fast heart rate from your atrial fibrillation.  We were able to improve your heart rate control here; your balance and ability to be up and around has looked improved.  Today you're ready for discharge back to your residence.     Follow Up and recommended labs and tests    Follow up with Nursing home physician.  No specific follow up labs or tests are needed.     Activity - Up with nursing assistance     DNR/DNI     Fall precautions     Advance Diet as Tolerated    Follow this diet upon discharge: Orders Placed This Encounter      Regular Diet Adult        Discharge  Medications   Current Discharge Medication List      START taking these medications    Details   digoxin (LANOXIN) 125 MCG tablet Take 1 tablet (125 mcg) by mouth daily    Associated Diagnoses: Rapid atrial fibrillation (H)         CONTINUE these medications which have CHANGED    Details   cloNIDine (CATAPRES) 0.2 MG tablet Take 1 tablet (0.2 mg) by mouth 2 times daily    Associated Diagnoses: Essential hypertension      oseltamivir (TAMIFLU) 75 MG capsule Take 1 capsule (75 mg) by mouth daily    Associated Diagnoses: Leukemoid reaction         CONTINUE these medications which have NOT CHANGED    Details   acetaminophen (TYLENOL) 325 MG tablet Take 2 tablets (650 mg) by mouth 3 times daily    Associated Diagnoses: Pain      apixaban ANTICOAGULANT (ELIQUIS) 5 MG tablet Take 1 tablet (5 mg) by mouth 2 times daily    Associated Diagnoses: Atrial fibrillation with RVR (H)      aspirin (ASA) 81 MG tablet Take 1 tablet (81 mg) by mouth daily  Refills: OTC    Associated Diagnoses: Carotid stenosis, asymptomatic, bilateral      atorvastatin (LIPITOR) 40 MG tablet Take 20 mg by mouth every morning       gabapentin (NEURONTIN) 100 MG capsule Take 100 mg by mouth At Bedtime      MULTIPLE VITAMINS-MINERALS PO Take 1 tablet by mouth daily      omeprazole 20 MG tablet Take 20 mg by mouth daily      polyethylene glycol (MIRALAX/GLYCOLAX) packet Take 17 g by mouth daily      senna (SENOKOT) 8.6 MG tablet Take 1 tablet by mouth 2 times daily      sertraline (ZOLOFT) 25 MG tablet Take 50 mg by mouth daily          STOP taking these medications       diltiazem ER (TIAZAC) 360 MG 24 hr ER beaded capsule Comments:   Reason for Stopping:         metoprolol tartrate (LOPRESSOR) 25 MG tablet Comments:   Reason for Stopping:             Allergies   No Known Allergies    Consultations This Hospital Stay   Consultation during this admission received from:    PHYSICAL THERAPY ADULT IP CONSULT  OCCUPATIONAL THERAPY ADULT IP CONSULT  CARE  TRANSITION RN/SW IP CONSULT    Significant Results and Procedures   Procedures    None.    Data   Results for orders placed or performed during the hospital encounter of 04/13/19   Head CT w/o contrast    Narrative    CT SCAN OF THE HEAD WITHOUT CONTRAST   4/13/2019 1:48 PM     HISTORY: Altered level of consciousness (LOC), unexplained; ; A  witnessed fall out of bed, cognitive impairments.  On Eliquis.   Evaluate for acute intracranial process due to trauma post fall    TECHNIQUE:  Axial images of the head and coronal reformations without  IV contrast material. Radiation dose for this scan was reduced using  automated exposure control, adjustment of the mA and/or kV according  to patient size, or iterative reconstruction technique.    COMPARISON: None.    FINDINGS:     Intracranial contents: There is diffuse parenchymal volume loss.   White matter changes are present in the cerebral hemispheres that are  consistent with small vessel ischemic disease in this age patient.  There is a chronic area of encephalomalacia in the left occipital  lobe. There is no evidence of intracranial hemorrhage, mass, acute  infarct or anomaly.    Visualized orbits/sinuses/mastoids:  The visualized portions of the  sinuses and mastoids appear normal.    Osseous structures/soft tissues:  No intracranial hemorrhage or skull  fractures.      Impression    IMPRESSION:   1. No intracranial hemorrhage or skull fractures.  2. Chronic encephalomalacia in the left occipital lobe.  3. There is diffuse parenchymal volume loss.  White matter changes are  present in the cerebral hemispheres that are consistent with small  vessel ischemic disease in this age patient.      NIDIA MCKEON MD   Cervical spine CT w/o contrast    Narrative    CT CERVICAL SPINE WITHOUT CONTRAST   4/13/2019 1:48 PM     HISTORY: Unwitnessed fall at care facility.  Evaluate for acute bony  process due to trauma., Unwitnessed fall at care facility.  Evaluate  for acute bony  process due to trauma.     TECHNIQUE: Axial images of the cervical spine were obtained without  intravenous contrast. Multiplanar reformations were performed.   Radiation dose for this scan was reduced using automated exposure  control, adjustment of the mA and/or kV according to patient size, or  iterative reconstruction technique.    COMPARISON: None.    FINDINGS: There is no evidence of fracture. Alignment is normal.      Craniocervical junction: Normal.     C1-C2:  Normal.     C2-C3:  There is ankylosis of the right facet joint. The neural  foramen are patent.     C3-C4:  Severe degenerative change in the left facet joint. Mild  annular bulge. Central canal and neural foramen are patent.     C4-C5:  Central canal normal. Mild degenerative change in the facet  joints.     C5-C6:  Loss of disc space height. Small central disc osteophyte  complex. Central canal normal. Mild bilateral foraminal stenosis.      C6-C7:  Normal disc, facet joints, spinal canal and neural foramina.      C7-T1:   Normal disc, facet joints, spinal canal and neural foramina.      Impression    IMPRESSION:    1. Negative for fracture.  2. Multilevel arthritic change.    NIDIA MCKEON MD   Chest XR,  PA & LAT    Narrative    XR CHEST TWO VIEWS   4/13/2019 1:41 PM     HISTORY: Unwitnessed fall out of bed, tachypnea. Evaluate for focal  infiltrate versus rib fracture versus other acute cardiothoracic  process.    COMPARISON: 3/6/2019.      Impression    IMPRESSION: Interstitial markings most prominent in the upper lobes  are somewhat more prominent than on the prior study which may be a  combination of baseline fibrosis and perhaps some mild interstitial  edema related to congestive heart failure. Heart size is upper-normal  to mildly enlarged. No pleural fluid or focal infiltrate.    ADONIS ECKERT MD   Radius/Ulna XR,  PA &LAT, left    Narrative    XR LEFT FOREARM TWO VIEWS   4/13/2019 1:42 PM     HISTORY: A witnessed fall at care facility. Left  "forearm pain.  Evaluate for bony process due to trauma.    COMPARISON: None.      Impression    IMPRESSION: Old ulnar styloid avulsion fracture. No evidence of acute  fracture or subluxation. No evidence of elbow joint effusion. Mild  radiocarpal degenerative changes.    ADONIS ECKERT MD   Humerus XR,  G/E 2 views, left    Narrative    XR LEFT HUMERUS TWO OR MORE VIEWS   4/13/2019 1:42 PM     HISTORY: Unwitnessed fall at care facility. Left arm pain. Evaluate  for bony process due to trauma.    COMPARISON: None.      Impression    IMPRESSION: No evidence of acute fracture or subluxation.    ADONIS ECKERT MD       History of Present Illness   (From H&P) Patient with a history of A. fib [on rate control and anticoagulation with Eliquis], hyperlipidemia, hypertension, GERD, mood disorder amongst other medical problems as outlined below.  Patient has baseline dementia and is not  able to provide history during my evaluation.  History below as documented by ED physician:  \"EMS reports patient was found on the floor at 7:50 AM.  Fall was not witnessed.  There was concern by care staff the patient remained hypertensive despite receiving clonidine and nitroglycerin with systolic pressures ranging from 150-170 and diastolic pressure ranging from 110-125.  Patient was also notably tachycardic consistent with rapid atrial fibrillation.  Patient is listed to be DNR/DNI per directives from her care facility dated March 19, 2019.  No interventions were administered on transport patient was reported to be a second assist with help up to the wheelchair.  Patient did not complain of any neck pain no back pain or hip pain.  She was notably tachypneic with respiratory [id number] per EMS.  Patient was seen upon arrival as a trauma evaluation for concern for fall with rapid atrial fibrillation.  Patient was unable to provide a coherent history.  She did not appear in any distress.  There was no family at the bedside to provide additional " "history upon ED arrival\".      Hospital Course   Citlalli Villarreal was admitted on 4/13/2019.  The following problems were addressed during her hospitalization:      Rapid atrial fibrillation (H)/ History of Embolic stroke due to atrial fibrillation (H):   Patient with a history of atrial fibrillation.  Recently started on Eliquis during 3/20/2019 admission after she had developed expressive aphasia likely secondary to embolic stroke due to a-fib.  Preadmission was on diltiazem and metoprolol for rate control, but was admitted with a-fib with RVR.  Unable to rate control with oral metoprolol in the ICU so a diltiazem drip was started.  Preadmission diltiazem and metoprolol oral doses were resumed, after which patient became severely bradycardic.  Diltiazem PO and gtts were stopped, and metoprolol PO stopped, on 4/14/19.  Required chronotropic support with dopamine gtts for a period of about 24 hours, discontinued early morning 4/16/19.  Given the observed bradycardia following metoprolol and diltiazem PO use, we decided to use digoxin for rate control.  On 4/16/19 was given digoxin 0.5 mg IV x1, then started on digoxin 0.125 mg every day this morning.  HR is variable on this Rx, from 66 to 125.  More rapid HR seem to occur associated with periods of agitation.  Although history is difficult to obtain, it appears that she is asymptomatic even at HR in the 120's.  - Digoxin 0.125 mg every day.  Could consider increase to 0.25 mg every day if HR control suboptimal.  - Patient declines any consideration of pacer per discussion with Dr. Sanchez 4/14/19.  - Continue apixaban anticoagulation.  - Diltiazem and metoprolol are discontinued at discharge.       Fall, suspect due to atrial fibrillation with rapid ventricular response  Unwitnessed fall in patient with dementia, found by EMS.  Has had recent past admissions where she also fell, was also noted to be in a-fib.  Physical therapy and occupational therapy assessments were " performed this hospital stay, recommendations include assistance with hand hold when up and around, and use of walker if not already using one in long-term care.  - Assist when ambulating with hand hold, consider providing her with walker if she doesn't have one.       HLD (hyperlipidemia): Continue prior to admission Lipitor       Cognitive impairment:   Has known baseline dementia, with some agitated delirium this admission.  RN reports intermittent periods of agitation with verbal behaviors, but no significant physical behaviors.  This may be patient's baseline.  - Observe.       Moderate major depressive disorder, unspecified whether recurrent (H):   Preadmission was on sertraline, which has continued.  Dementia makes depression assessment difficult.  - Continue sertraline.        Chronic constipation:   Continue preadmission MiraLAX and Senna.       Gastroesophageal reflux disease with esophagitis:   Continue PTA omeprazole       Essential hypertension:   Continuing to see some diastolic hypertension after discontinuation of preadmission diltiazem and metoprolol have been stopped due to bradycardia (see above).  Was on clonidine 0.1 mg BID preadmission, which we have increased to 0.2 mg BID.  Will not make any further increase in clonidine dose presently, as measurement of BP causes her to be agitated, which probably adds to diastolic hypertension.   - Continue clonidine 0.2 mg BID, follow BP after discharge.  - Preadmission diltiazem and metoprolol have been discontinued given problems with severe bradycardia here; see above.       Acute kidney injury due to hypovolemia - baseline GFR variable, 52 - 76 over past few months.  Admit GFR 61 --> 45 on 4/15/19.  No nephrotoxins per med list, no diuresis attempted, so decline in renal function was felt to be mildly volume depletion.  Was given an additional  mL bolus 4/15/19, repeat GFR 66 after this bolus.  - GFR back to baseline.         Leukocytosis - admit  WBC 7.7 --> 14.3 --> 8.4.  No fever, no clinical evidence of infection.  Probably represented response to vasoactive amine Rx (dopamine), which is now stopped.  - Resolved.      On prophylactic tamiflu from facility - I cannot find the date on which Rx was started.  Duration of oseltamivir should be at least two weeks total, and for at least one week after the last influenza case in the facility is identified.  Will need to defer eventual discontinuation of oseltamivir therapy to long-term care staff based upon these recommendations.        Pending Results   Unresulted Labs Ordered in the Past 30 Days of this Admission     Date and Time Order Name Status Description    4/15/2019 0415 T4 free In process           Physical Exam   Temp:  [98  F (36.7  C)-98.2  F (36.8  C)] 98.2  F (36.8  C)  Pulse:  [] 44  Heart Rate:  [65-98] 65  Resp:  [18-20] 18  BP: (104-150)/() 132/86  SpO2:  [94 %-98 %] 98 %  Vitals:    04/13/19 1950 04/15/19 0630   Weight: 53.4 kg (117 lb 11.6 oz) 55.1 kg (121 lb 7.6 oz)     GENERAL: Slender, frail-appearing woman, lying in bed, looks comfortable.    RESP: No accessory muscle use.  Symmetrical breath sounds.  Scant crackles on inspiration at both posterior bases, lungs clear otherwise on inspiration and expiration.  Expiration not prolonged, no wheeze.  CV: Irregularly irregular, non-tachycardic.  Normal S1 S2, no murmur heard, no extra sound.  No lower extremity edema.  ABDOMEN: Soft, non-tender, no guarding.  Liver and spleen not enlarged, no masses palpable.  Bowel sounds positive.  MS: No bony deformities noted.  No red or inflamed joints.  SKIN: Warm and dry, no rashes.  NEURO: Alert, conversant, vague in responses, repetitive when agitated, tells me repeatedly to get out of her room.  Cranial nerves III - XII grossly intact.  No gross motor or sensory deficits.  Gait looks fairly steady with minimal assist.  PSYCH: Initially calm, then agitated later in visit, telling me  repeatedly to get out of her room.        The discharge plan was discussed with the patient and in multi-disciplinary rounds.    Total time on this discharge was 15 minutes.    Bill Fields MD

## 2019-04-18 NOTE — PLAN OF CARE
"Pt was pleasant and able to re-direct last night, she also agreed to take her medications.  Pt drank almost 2 containers of Wild Berry Breeze, she said she \"didn't like it\" but kept asking for more.  Pt was up several times to void but returned to the bed and slept.  She is very unsteady and does need someone with her when she walks to the bathroom. Will continue to monitor close for changes.  "

## 2019-04-23 NOTE — LETTER
4/23/2019        RE: Citlalli Villarreal  [facility]  [address] [facility] [address]        [facility]  PRIMARY CARE PROVIDER AND CLINIC:  JADA Us CNP, [address] / [place] [address]  Chief Complaint   Patient presents with     Hospital F/U     [facility] Medical Record Number:  [id number]  Place of Service where encounter took place:  [facility]  ([facility]) [[id number]]    Citlalli Villarreal  is a 73 year old  (1945), re-admitted to the above facility from  [facility]. Hospital stay 4/13/19 through 4/18/19..  Admitted to this facility for  rehab, medical management and nursing care.    HPI:    HPI information obtained from: facility chart records, facility staff and [facility] chart review.   Brief Summary of Hospital Course: Citlalli Villarreal was hospitalized at [facility] from 4/13-18/19 after an unwitnessed fall at [facility].  She was hypertensive despite clonidine, nitroglycerin and tachycardic.  Rate control of afib was difficult to achieve so digoxin was started and diltiazem and metoprolol were discontinued. Remained hypertensive so clonidine 0.2 mg BID was added for BP. She continues with baseline dementia, some agitated delirium.    Updates on Status Since Skilled nursing Admission:  Since her hospital return, she has been feeling well, pleasant and much less anxiety.  Her daughter, who lives in [place], has become involved with her care and she is very happy about this.  Daughter, Susan, hopes to move Citlalli to [place] to be near her as she is now her decision maker.     CODE STATUS/ADVANCE DIRECTIVES DISCUSSION:   DNR / DNI  Patient's living condition: lives in a skilled nursing facility  ALLERGIES: Patient has no known allergies.  PAST MEDICAL HISTORY:  has a past medical history of Atrial fibrillation (H), Cerebral infarction (H), Depressive disorder, Gastroesophageal reflux disease,  Gastrointestinal hemorrhage, History of embolic stroke with hemorrhagic conversion 10/15/2018. (3/10/2019), Hyperlipemia, Hypertension, Major depressive disorder, Mild cognitive impairment, so stated, Neurologic neglect syndrome, Neuromuscular dysfunction of bladder, unspecified, Nontraumatic intracranial hemorrhage (H), Occlusion and stenosis of bilateral carotid arteries, Radial fracture, Repeated falls, and Right wrist fracture (10/2018). She also has no past medical history of Thyroid disease.  PAST SURGICAL HISTORY:   has a past surgical history that includes Forearm surgery (10/2018).  FAMILY HISTORY: Family history is unknown by patient.  SOCIAL HISTORY:   has an unknown smoking status. She has never used smokeless tobacco. She reports that she does not drink alcohol or use drugs.    Post Discharge Medication Reconciliation Status: discharge medications reconciled and changed, per note/orders (see AVS)  Current Outpatient Medications   Medication Sig Dispense Refill     acetaminophen (TYLENOL) 325 MG tablet Take 2 tablets (650 mg) by mouth 3 times daily       apixaban ANTICOAGULANT (ELIQUIS) 5 MG tablet Take 1 tablet (5 mg) by mouth 2 times daily       aspirin (ASA) 81 MG tablet Take 1 tablet (81 mg) by mouth daily  OTC     atorvastatin (LIPITOR) 40 MG tablet Take 20 mg by mouth every morning        cloNIDine (CATAPRES) 0.2 MG tablet Take 1 tablet (0.2 mg) by mouth 2 times daily       digoxin (LANOXIN) 125 MCG tablet Take 1 tablet (125 mcg) by mouth daily       gabapentin (NEURONTIN) 100 MG capsule Take 100 mg by mouth At Bedtime       MULTIPLE VITAMINS-MINERALS PO Take 1 tablet by mouth daily       omeprazole 20 MG tablet Take 20 mg by mouth daily       polyethylene glycol (MIRALAX/GLYCOLAX) packet Take 17 g by mouth daily       senna (SENOKOT) 8.6 MG tablet Take 1 tablet by mouth 2 times daily       sertraline (ZOLOFT) 25 MG tablet Take 50 mg by mouth daily        ROS:  Limited secondary to cognitive  impairment but today pt reports she is feeling well     Vitals:  /84   Pulse 61   Temp 98.6  F (37  C)   Resp 20   Wt 55.1 kg (121 lb 6.4 oz)   SpO2 100%   BMI 19.01 kg/m     Exam:  GENERAL APPEARANCE:  Alert, in no distress   HEAD:  Normal, normocephalic, atraumatic  EYE EXAM: normal external eye, conjunctiva, lids, SHERICE  NECK EXAM: supple, no JVD  CHEST/RESP:  respiratory effort normal, lung sounds CTA , no respiratory distress  CV:  Rate reg, rhythm reg, no murmur, no peripheral edema   M/S:   extremities normal, gait normal-without assistive device and forgets to ask for help, normal muscle tone, and range of motion normal   NEUROLOGIC EXAM: Normal gross motor movement, tone and coordination. No tremor. Cranial nerves 2-12 are normal tested and grossly at patient's baseline  PSYCH:  Alert and oriented to self and surroundings with forgetfulness, affect pleasant today, judgement impaired by cognitive losses        Lab/Diagnostic data:  Recent labs in [facility] reviewed by me today.     ASSESSMENT/PLAN:  Chronic atrial fibrillation (H)  Long term current use of anticoagulant therapy  Patient with afib difficult to control.  Now on digoxin for rate control.  Rate in excellent range today.  Remains on eliquis.  -check dig level next week    Cerebrovascular accident (CVA) due to embolism of left anterior cerebral artery (H)  Expressive aphasia  No new symptoms, stable.     Dementia without behavioral disturbance, unspecified dementia type  Ongoing dementia, but today she is remarkably cheerful and cooperative.  Eating better, weight improved a bit.  The current medical regimen is effective;  continue present plan and medications.        transcribed by : Bubba Diaz  1. Lab draw 4/30/19 to include  -digoxin Dx: Afib  -BMP Dx: HTN      Electronically signed by:  JADA Us CNP           Sincerely,        JADA Us CNP

## 2019-04-23 NOTE — PROGRESS NOTES
[place] [facility]  PRIMARY CARE PROVIDER AND CLINIC:  JADA Us CNP, [address] / [place] [address]  Chief Complaint   Patient presents with     Hospital F/U     [facility] Medical Record Number:  [id number]  Place of Service where encounter took place:  [facility]  ([facility]) [[id number]]    Citlalli Villarreal  is a 73 year old  (1945), re-admitted to the above facility from  [facility]. Hospital stay 4/13/19 through 4/18/19..  Admitted to this facility for  rehab, medical management and nursing care.    HPI:    HPI information obtained from: facility chart records, facility staff and [facility] chart review.   Brief Summary of Hospital Course: Citlalli Villarreal was hospitalized at [facility] from 4/13-18/19 after an unwitnessed fall at [facility].  She was hypertensive despite clonidine, nitroglycerin and tachycardic.  Rate control of afib was difficult to achieve so digoxin was started and diltiazem and metoprolol were discontinued. Remained hypertensive so clonidine 0.2 mg BID was added for BP. She continues with baseline dementia, some agitated delirium.    Updates on Status Since Skilled nursing Admission:  Since her hospital return, she has been feeling well, pleasant and much less anxiety.  Her daughter, who lives in [place], has become involved with her care and she is very happy about this.  Daughter, Susan, hopes to move Citlalli to [place] to be near her as she is now her decision maker.     CODE STATUS/ADVANCE DIRECTIVES DISCUSSION:   DNR / DNI  Patient's living condition: lives in a skilled nursing facility  ALLERGIES: Patient has no known allergies.  PAST MEDICAL HISTORY:  has a past medical history of Atrial fibrillation (H), Cerebral infarction (H), Depressive disorder, Gastroesophageal reflux disease, Gastrointestinal hemorrhage, History of embolic stroke with hemorrhagic conversion 10/15/2018. (3/10/2019), Hyperlipemia,  Hypertension, Major depressive disorder, Mild cognitive impairment, so stated, Neurologic neglect syndrome, Neuromuscular dysfunction of bladder, unspecified, Nontraumatic intracranial hemorrhage (H), Occlusion and stenosis of bilateral carotid arteries, Radial fracture, Repeated falls, and Right wrist fracture (10/2018). She also has no past medical history of Thyroid disease.  PAST SURGICAL HISTORY:   has a past surgical history that includes Forearm surgery (10/2018).  FAMILY HISTORY: Family history is unknown by patient.  SOCIAL HISTORY:   has an unknown smoking status. She has never used smokeless tobacco. She reports that she does not drink alcohol or use drugs.    Post Discharge Medication Reconciliation Status: discharge medications reconciled and changed, per note/orders (see AVS)  Current Outpatient Medications   Medication Sig Dispense Refill     acetaminophen (TYLENOL) 325 MG tablet Take 2 tablets (650 mg) by mouth 3 times daily       apixaban ANTICOAGULANT (ELIQUIS) 5 MG tablet Take 1 tablet (5 mg) by mouth 2 times daily       aspirin (ASA) 81 MG tablet Take 1 tablet (81 mg) by mouth daily  OTC     atorvastatin (LIPITOR) 40 MG tablet Take 20 mg by mouth every morning        cloNIDine (CATAPRES) 0.2 MG tablet Take 1 tablet (0.2 mg) by mouth 2 times daily       digoxin (LANOXIN) 125 MCG tablet Take 1 tablet (125 mcg) by mouth daily       gabapentin (NEURONTIN) 100 MG capsule Take 100 mg by mouth At Bedtime       MULTIPLE VITAMINS-MINERALS PO Take 1 tablet by mouth daily       omeprazole 20 MG tablet Take 20 mg by mouth daily       polyethylene glycol (MIRALAX/GLYCOLAX) packet Take 17 g by mouth daily       senna (SENOKOT) 8.6 MG tablet Take 1 tablet by mouth 2 times daily       sertraline (ZOLOFT) 25 MG tablet Take 50 mg by mouth daily        ROS:  Limited secondary to cognitive impairment but today pt reports she is feeling well     Vitals:  /84   Pulse 61   Temp 98.6  F (37  C)   Resp 20   Wt  55.1 kg (121 lb 6.4 oz)   SpO2 100%   BMI 19.01 kg/m    Exam:  GENERAL APPEARANCE:  Alert, in no distress   HEAD:  Normal, normocephalic, atraumatic  EYE EXAM: normal external eye, conjunctiva, lids, SHERICE  NECK EXAM: supple, no JVD  CHEST/RESP:  respiratory effort normal, lung sounds CTA , no respiratory distress  CV:  Rate reg, rhythm reg, no murmur, no peripheral edema   M/S:   extremities normal, gait normal-without assistive device and forgets to ask for help, normal muscle tone, and range of motion normal   NEUROLOGIC EXAM: Normal gross motor movement, tone and coordination. No tremor. Cranial nerves 2-12 are normal tested and grossly at patient's baseline  PSYCH:  Alert and oriented to self and surroundings with forgetfulness, affect pleasant today, judgement impaired by cognitive losses        Lab/Diagnostic data:  Recent labs in [facility] reviewed by me today.     ASSESSMENT/PLAN:  Chronic atrial fibrillation (H)  Long term current use of anticoagulant therapy  Patient with afib difficult to control.  Now on digoxin for rate control.  Rate in excellent range today.  Remains on eliquis.  -check dig level next week    Cerebrovascular accident (CVA) due to embolism of left anterior cerebral artery (H)  Expressive aphasia  No new symptoms, stable.     Dementia without behavioral disturbance, unspecified dementia type  Ongoing dementia, but today she is remarkably cheerful and cooperative.  Eating better, weight improved a bit.  The current medical regimen is effective;  continue present plan and medications.        transcribed by : Bubba Diaz  1. Lab draw 4/30/19 to include  -digoxin Dx: Afib  -BMP Dx: HTN      Electronically signed by:  JADA Us CNP

## 2019-06-14 NOTE — PROGRESS NOTES
[facility]  Chief Complaint   Patient presents with     detention Regulatory   [place] Medical Record Number:  [id number]  Place of Service where encounter took place:  [facility]  (S) [[id number]]    HPI:    Citlalli Villarreal is a 73 year old  (1945), who is being seen today for a federally mandated E/M visit.  HPI information obtained from: facility chart records, facility staff, patient report and [facility] chart review.     Today's concerns are:  - Resident seen and examined.  Was admitted in March for a-fib RVR and new stroke, again admitted in April for a fall was found to be hypertensive and A-fib with RVR  - reports doing fine. Reports appetite, sleep and BM are fine. Denies chest pain, palpitation.   - GNP and nursing staff report no concern today.   ....................................................................................  - Past Medical, social, family histories, medications, and allergies reviewed and updated  - Medications reviewed: in the chart and EHR.   - Case Management:   I have reviewed the care plan and MDS and do agree with the plan. Patient's desire to return to the community is not present.  Information reviewed:  Medications, vital signs, orders, and nursing notes.    MEDICATIONS:  Current Outpatient Medications   Medication Sig Dispense Refill     acetaminophen (TYLENOL) 325 MG tablet Take 2 tablets (650 mg) by mouth 3 times daily       apixaban ANTICOAGULANT (ELIQUIS) 5 MG tablet Take 1 tablet (5 mg) by mouth 2 times daily       aspirin (ASA) 81 MG tablet Take 1 tablet (81 mg) by mouth daily  OTC     atorvastatin (LIPITOR) 40 MG tablet Take 20 mg by mouth every morning        cloNIDine (CATAPRES) 0.2 MG tablet Take 1 tablet (0.2 mg) by mouth 2 times daily       digoxin (LANOXIN) 125 MCG tablet Take 1 tablet (125 mcg) by mouth daily       gabapentin (NEURONTIN) 100 MG capsule Take 100 mg by mouth At Bedtime       MULTIPLE  VITAMINS-MINERALS PO Take 1 tablet by mouth daily       omeprazole 20 MG tablet Take 20 mg by mouth daily       polyethylene glycol (MIRALAX/GLYCOLAX) packet Take 17 g by mouth daily       senna (SENOKOT) 8.6 MG tablet Take 1 tablet by mouth 2 times daily       sertraline (ZOLOFT) 25 MG tablet Take 50 mg by mouth daily        ROS: Limited secondary to cognitive impairment but today pt reports- see HPI    Exam:  Vitals: BP (!) 167/100   Pulse 74   Temp 97.3  F (36.3  C)   Resp 16   Wt 53.9 kg (118 lb 12.8 oz)   SpO2 99%   BMI 18.61 kg/m    BMI= Body mass index is 18.61 kg/m .  GENERAL APPEARANCE:  Alert, in no distress  RESP:  Lungs clear to auscultation   CV:  S1S2 normal,  no edema, irregular rhythm . no murmur.   ABDOMEN:  normal bowel sounds, soft, nontender, no palpable masses  M/S:   Gait and station abnormal uses WC.   SKIN: no rash noted  NEURO:   Hand : 4/5 R, 5/5 L. DTR: triceps +2 triceps and knee  no purposeful movement in upper and lower extremities  PSYCH: mood and affect appropriate.      Lab/Diagnostic data: Reviewed in the chart and EHR.      ASSESSMENT/PLAN  --------------------------------  Cerebrovascular accident (CVA) due to embolism of left anterior cerebral artery (H)  History of embolic stroke with hemorrhagic conversion 10/15/2018.  - had embolic stroke in March and a-fib with RVR. Repeated CT brain with multiple old infarcts. Tariq risk for bleeding and stroke, started on Eliquis.      Atrial fibrillation, chronic (H)  Carotid stenosis, bilateral  - Pt was taken off amiodarone in the past for failing to convert to sinus and digoxin (query amyloidosis). However, had recurrent A-fib with RVR required hospitalization x2, hence metoprolol and diltiazem stopped and started on Digoxin (April 2019). Continued to have RVR, hence metoprolol small dose started at LTC.  Started on Eliquis and ASA.    Hypertensive Heart Disease: - in general w/i acceptable range.     Hx of Wrist fracture,  right, closed,   Closed fracture of distal end of right radius with routine healing, unspecified fracture morphology, subsequent encounter  - analgesia at  Baseline. Cannot make a right hand complete fist.      Hx of Gastrointestinal hemorrhage:   - at baseline. On omeprazole     Cognitive Impairment  - SLUMS 11/30, MOCA 11/30, indicates severe  - CLQT 105/183 on memory portion and 24/37 on language portion--this indicates moderate cognitive impairment   - - Continue to anticipate needs. Chronic condition, ongoing decline expected.   -  Continue to provide redirection and reassurance as needed. Maintain safe living situation with goals focused on comfort.  - Resident will need a guardianship given family not involved in her care. She does not have the capacity to make her own decision. Form filled and singed.     Orders:  - see above recommendations, otherwise no new order and continue current management plane.     Total time spent with patient visit at the skilled nursing facility was 35 min including patient visit, review of past records and discussing with nursing staff, reviewing NP's notes/reports. Greater than 50% of total time spent with coordinating care due to above multiple comorbidities.    Electronically signed by:  Pasquale Johnson MD

## 2019-06-17 NOTE — LETTER
6/17/2019        RE: Citlalli Villarreal  [facility]  [address] [facility] [address]        [place] [facility]  Chief Complaint   Patient presents with     long-term Regulatory   [place] Medical Record Number:  [id number]  Place of Service where encounter took place:  [facility]  ([facility]) [[id number]]    HPI:    Citlalli Villarreal is a 73 year old  (1945), who is being seen today for a federally mandated E/M visit.  HPI information obtained from: facility chart records, facility staff, patient report and [facility] chart review.     Today's concerns are:  - Resident seen and examined.  Was admitted in March for a-fib RVR and new stroke, again admitted in April for a fall was found to be hypertensive and A-fib with RVR  - reports doing fine. Reports appetite, sleep and BM are fine. Denies chest pain, palpitation.   - GNP and nursing staff report no concern today.   ....................................................................................  - Past Medical, social, family histories, medications, and allergies reviewed and updated  - Medications reviewed: in the chart and EHR.   - Case Management:   I have reviewed the care plan and MDS and do agree with the plan. Patient's desire to return to the community is not present.  Information reviewed:  Medications, vital signs, orders, and nursing notes.    MEDICATIONS:  Current Outpatient Medications   Medication Sig Dispense Refill     acetaminophen (TYLENOL) 325 MG tablet Take 2 tablets (650 mg) by mouth 3 times daily       apixaban ANTICOAGULANT (ELIQUIS) 5 MG tablet Take 1 tablet (5 mg) by mouth 2 times daily       aspirin (ASA) 81 MG tablet Take 1 tablet (81 mg) by mouth daily  OTC     atorvastatin (LIPITOR) 40 MG tablet Take 20 mg by mouth every morning        cloNIDine (CATAPRES) 0.2 MG tablet Take 1 tablet (0.2 mg) by mouth 2 times daily       digoxin (LANOXIN) 125 MCG tablet Take 1 tablet (125 mcg) by  mouth daily       gabapentin (NEURONTIN) 100 MG capsule Take 100 mg by mouth At Bedtime       MULTIPLE VITAMINS-MINERALS PO Take 1 tablet by mouth daily       omeprazole 20 MG tablet Take 20 mg by mouth daily       polyethylene glycol (MIRALAX/GLYCOLAX) packet Take 17 g by mouth daily       senna (SENOKOT) 8.6 MG tablet Take 1 tablet by mouth 2 times daily       sertraline (ZOLOFT) 25 MG tablet Take 50 mg by mouth daily        ROS: Limited secondary to cognitive impairment but today pt reports- see HPI    Exam:  Vitals: BP (!) 167/100   Pulse 74   Temp 97.3  F (36.3  C)   Resp 16   Wt 53.9 kg (118 lb 12.8 oz)   SpO2 99%   BMI 18.61 kg/m     BMI= Body mass index is 18.61 kg/m .  GENERAL APPEARANCE:  Alert, in no distress  RESP:   Lungs clear to auscultation   CV:   S1S2 normal,  no edema, irregular rhythm . no murmur.   ABDOMEN:  normal bowel sounds, soft, nontender, no palpable masses  M/S:   Gait and station abnormal uses WC.   SKIN:  no rash noted  NEURO:   Hand : 4/5 R, 5/5 L. DTR: triceps +2 triceps and knee  no purposeful movement in upper and lower extremities  PSYCH:  mood and affect appropriate.      Lab/Diagnostic data: Reviewed in the chart and EHR.      ASSESSMENT/PLAN  --------------------------------  Cerebrovascular accident (CVA) due to embolism of left anterior cerebral artery (H)  History of embolic stroke with hemorrhagic conversion 10/15/2018.  - had embolic stroke in March and a-fib with RVR. Repeated CT brain with multiple old infarcts. Tariq risk for bleeding and stroke, started on Eliquis.      Atrial fibrillation, chronic (H)  Carotid stenosis, bilateral  - Pt was taken off amiodarone in the past for failing to convert to sinus and digoxin (query amyloidosis). However, had recurrent A-fib with RVR required hospitalization x2, hence metoprolol and diltiazem stopped and started on Digoxin (April 2019). Continued to have RVR, hence metoprolol small dose started at LTC.  Started on  Eliquis and ASA.    Hypertensive Heart Disease: - in general w/i acceptable range.     Hx of Wrist fracture, right, closed,   Closed fracture of distal end of right radius with routine healing, unspecified fracture morphology, subsequent encounter  - analgesia at  Baseline. Cannot make a right hand complete fist.      Hx of Gastrointestinal hemorrhage:   - at baseline. On omeprazole     Cognitive Impairment  - SLUMS 11/30, MOCA 11/30, indicates severe  - CLQT 105/183 on memory portion and 24/37 on language portion--this indicates moderate cognitive impairment   - - Continue to anticipate needs. Chronic condition, ongoing decline expected.   -  Continue to provide redirection and reassurance as needed. Maintain safe living situation with goals focused on comfort.  - Resident will need a guardianship given family not involved in her care. She does not have the capacity to make her own decision. Form filled and singed.     Orders:  - see above recommendations, otherwise no new order and continue current management plane.     Total time spent with patient visit at the skilled nursing facility was 35 min including patient visit, review of past records and discussing with nursing staff, reviewing NP's notes/reports. Greater than 50% of total time spent with coordinating care due to above multiple comorbidities.    Electronically signed by:  Pasquale Johnson MD        Sincerely,        Pasquale Johnson MD

## 2019-08-08 NOTE — LETTER
8/8/2019        RE: Citlalli Villarreal  [facility]  [address] [facility] [address]        [place] [facility]  Chief Complaint   Patient presents with     FCI Regulatory     [place] Medical Record Number:  [id number]  Place of Service where encounter took place:  [facility]  ([facility]) [[id number]]    HPI:    Citlalli Villarreal  is 73 year old (1945), who is being seen today for a federally mandated E/M visit.  HPI information obtained from: facility chart records, facility staff and patient report. Today's concerns are:     Frequent falls  Cerebrovascular accident (CVA) due to embolism of left anterior cerebral artery (H)  Dementia without behavioral disturbance, unspecified dementia type  Chronic atrial fibrillation (H)  Gastroesophageal reflux disease with esophagitis   Citlalli reports no new concerns, no pain, no dyspnea, states she lost her balance and fell to floor with no injuries.  Nursing reports one fall, no other new concerns.       ALLERGIES:Patient has no known allergies.  PAST MEDICAL HISTORY:   has a past medical history of Atrial fibrillation (H), Cerebral infarction (H), Depressive disorder, Gastroesophageal reflux disease, Gastrointestinal hemorrhage, History of embolic stroke with hemorrhagic conversion 10/15/2018. (3/10/2019), Hyperlipemia, Hypertension, Major depressive disorder, Mild cognitive impairment, so stated, Neurologic neglect syndrome, Neuromuscular dysfunction of bladder, unspecified, Nontraumatic intracranial hemorrhage (H), Occlusion and stenosis of bilateral carotid arteries, Radial fracture, Repeated falls, and Right wrist fracture (10/2018). She also has no past medical history of Thyroid disease.  PAST SURGICAL HISTORY:   has a past surgical history that includes Forearm surgery (10/2018).  FAMILY HISTORY: Family history is unknown by patient.  SOCIAL HISTORY:  has an unknown smoking status. She has never used smokeless tobacco.  She reports that she does not drink alcohol or use drugs.    MEDICATIONS:  Current Outpatient Medications   Medication Sig Dispense Refill     acetaminophen (TYLENOL) 325 MG tablet Take 2 tablets (650 mg) by mouth 3 times daily       apixaban ANTICOAGULANT (ELIQUIS) 5 MG tablet Take 1 tablet (5 mg) by mouth 2 times daily       aspirin (ASA) 81 MG tablet Take 1 tablet (81 mg) by mouth daily  OTC     atorvastatin (LIPITOR) 40 MG tablet Take 20 mg by mouth every morning        cloNIDine (CATAPRES) 0.2 MG tablet Take 1 tablet (0.2 mg) by mouth 2 times daily       digoxin (LANOXIN) 125 MCG tablet Take 1 tablet (125 mcg) by mouth daily       gabapentin (NEURONTIN) 100 MG capsule Take 100 mg by mouth At Bedtime       MULTIPLE VITAMINS-MINERALS PO Take 1 tablet by mouth daily       omeprazole 20 MG tablet Take 20 mg by mouth daily       polyethylene glycol (MIRALAX/GLYCOLAX) packet Take 17 g by mouth daily       senna (SENOKOT) 8.6 MG tablet Take 1 tablet by mouth 2 times daily       sertraline (ZOLOFT) 25 MG tablet Take 50 mg by mouth daily          Case Management:  I have reviewed the care plan and MDS and do agree with the plan. Patient's desire to return to the community is not assessible due to cognitive impairment. Information reviewed:  Medications, vital signs, orders, and nursing notes.    ROS:  Limited secondary to cognitive impairment but today pt reports no new concerns     Vitals:  BP (!) 153/103   Pulse 83   Temp 96.5  F (35.8  C)   Resp 16   Wt 56.3 kg (124 lb 3.2 oz)   SpO2 98%   BMI 19.45 kg/m     Body mass index is 19.45 kg/m .  Exam:  GENERAL APPEARANCE:  Alert, in no distress  ENT:  Mouth and posterior oropharynx normal, moist mucous membranes, hearing acuity hard of hearing   EYES:  EOM, conjunctivae, lids, pupils and irises normal  RESP:  respiratory effort normal, no respiratory distress, Lung sounds CTA  CV:  Auscultation of heart done , rate and rhythm irreg, no murmur, no rub or gallop, no  edema   M/S:   Gait and station within normal limits-poor balance but ambulates without assistive device, Digits and nails within normal limits   SKIN:  CDI  NEURO: Cranial nerves 2-12 in normal limits and at patient's baseline  PSYCH:  insight and judgement impaired , memory impaired  , affect and mood normal baseline, occasionally anxious and rude     Lab/Diagnostic data:   Labs done in SNF are in [place] EPIC. Please refer to them using EPIC/Care Everywhere.    ASSESSMENT/PLAN  Frequent falls  Cerebrovascular accident (CVA) due to embolism of left anterior cerebral artery (H)  Dementia without behavioral disturbance, unspecified dementia type  Patient with extensive memory and judgement impairments S/P CVA, with decline likely to continue.  She requires memory care for supervision and assistance with cares to maintain her safety.     Chronic atrial fibrillation (H)  On apixaban BID, no obvious symptoms bleeding. Stable irregular rhythm. No new dyspnea.     GERD  Has past medical history significant for GERD on omeprazole.  Per recommendation of pharmacy, this should be given at least 30 minutes prior to meal for best effects.    -nursing to make this change      Orders written by provider at facility and transcribed by : Bubba Diaz  1. Ok to change time of administration of omeprazole to 0600 if she will take it at that time      Electronically signed by:  JADA Us CNP              Sincerely,        JADA Us CNP

## 2019-08-08 NOTE — PROGRESS NOTES
[place] [facility]  Chief Complaint   Patient presents with     retirement Regulatory     [place] Medical Record Number:  [id number]  Place of Service where encounter took place:  [facility]  ([facility]) [[id number]]    HPI:    Citlalli Villarreal  is 73 year old (1945), who is being seen today for a federally mandated E/M visit.  HPI information obtained from: facility chart records, facility staff and patient report. Today's concerns are:     Frequent falls  Cerebrovascular accident (CVA) due to embolism of left anterior cerebral artery (H)  Dementia without behavioral disturbance, unspecified dementia type  Chronic atrial fibrillation (H)  Gastroesophageal reflux disease with esophagitis   Citlalli reports no new concerns, no pain, no dyspnea, states she lost her balance and fell to floor with no injuries.  Nursing reports one fall, no other new concerns.       ALLERGIES:Patient has no known allergies.  PAST MEDICAL HISTORY:   has a past medical history of Atrial fibrillation (H), Cerebral infarction (H), Depressive disorder, Gastroesophageal reflux disease, Gastrointestinal hemorrhage, History of embolic stroke with hemorrhagic conversion 10/15/2018. (3/10/2019), Hyperlipemia, Hypertension, Major depressive disorder, Mild cognitive impairment, so stated, Neurologic neglect syndrome, Neuromuscular dysfunction of bladder, unspecified, Nontraumatic intracranial hemorrhage (H), Occlusion and stenosis of bilateral carotid arteries, Radial fracture, Repeated falls, and Right wrist fracture (10/2018). She also has no past medical history of Thyroid disease.  PAST SURGICAL HISTORY:   has a past surgical history that includes Forearm surgery (10/2018).  FAMILY HISTORY: Family history is unknown by patient.  SOCIAL HISTORY:  has an unknown smoking status. She has never used smokeless tobacco. She reports that she does not drink alcohol or use drugs.    MEDICATIONS:  Current Outpatient Medications    Medication Sig Dispense Refill     acetaminophen (TYLENOL) 325 MG tablet Take 2 tablets (650 mg) by mouth 3 times daily       apixaban ANTICOAGULANT (ELIQUIS) 5 MG tablet Take 1 tablet (5 mg) by mouth 2 times daily       aspirin (ASA) 81 MG tablet Take 1 tablet (81 mg) by mouth daily  OTC     atorvastatin (LIPITOR) 40 MG tablet Take 20 mg by mouth every morning        cloNIDine (CATAPRES) 0.2 MG tablet Take 1 tablet (0.2 mg) by mouth 2 times daily       digoxin (LANOXIN) 125 MCG tablet Take 1 tablet (125 mcg) by mouth daily       gabapentin (NEURONTIN) 100 MG capsule Take 100 mg by mouth At Bedtime       MULTIPLE VITAMINS-MINERALS PO Take 1 tablet by mouth daily       omeprazole 20 MG tablet Take 20 mg by mouth daily       polyethylene glycol (MIRALAX/GLYCOLAX) packet Take 17 g by mouth daily       senna (SENOKOT) 8.6 MG tablet Take 1 tablet by mouth 2 times daily       sertraline (ZOLOFT) 25 MG tablet Take 50 mg by mouth daily          Case Management:  I have reviewed the care plan and MDS and do agree with the plan. Patient's desire to return to the community is not assessible due to cognitive impairment. Information reviewed:  Medications, vital signs, orders, and nursing notes.    ROS:  Limited secondary to cognitive impairment but today pt reports no new concerns     Vitals:  BP (!) 153/103   Pulse 83   Temp 96.5  F (35.8  C)   Resp 16   Wt 56.3 kg (124 lb 3.2 oz)   SpO2 98%   BMI 19.45 kg/m    Body mass index is 19.45 kg/m .  Exam:  GENERAL APPEARANCE:  Alert, in no distress  ENT:  Mouth and posterior oropharynx normal, moist mucous membranes, hearing acuity hard of hearing   EYES:  EOM, conjunctivae, lids, pupils and irises normal  RESP:  respiratory effort normal, no respiratory distress, Lung sounds CTA  CV:  Auscultation of heart done , rate and rhythm irreg, no murmur, no rub or gallop, no edema   M/S:   Gait and station within normal limits-poor balance but ambulates without assistive device,  Digits and nails within normal limits   SKIN:  CDI  NEURO: Cranial nerves 2-12 in normal limits and at patient's baseline  PSYCH:  insight and judgement impaired , memory impaired  , affect and mood normal baseline, occasionally anxious and rude     Lab/Diagnostic data:   Labs done in SNF are in [place] EPIC. Please refer to them using EPIC/Care Everywhere.    ASSESSMENT/PLAN  Frequent falls  Cerebrovascular accident (CVA) due to embolism of left anterior cerebral artery (H)  Dementia without behavioral disturbance, unspecified dementia type  Patient with extensive memory and judgement impairments S/P CVA, with decline likely to continue.  She requires memory care for supervision and assistance with cares to maintain her safety.     Chronic atrial fibrillation (H)  On apixaban BID, no obvious symptoms bleeding. Stable irregular rhythm. No new dyspnea.     GERD  Has past medical history significant for GERD on omeprazole.  Per recommendation of pharmacy, this should be given at least 30 minutes prior to meal for best effects.    -nursing to make this change      Orders written by provider at facility and transcribed by : Bubba Diaz  1. Ok to change time of administration of omeprazole to 0600 if she will take it at that time      Electronically signed by:  JADA Us CNP

## 2019-09-10 NOTE — PROGRESS NOTES
[place] [facility]  [place] Medical Record Number:  [id number]  Place of Service where encounter took place:  [facility]  ([facility]) [[id number]]  Chief Complaint   Patient presents with     RECHECK     HPI: Citlalli Villarreal  is a 73 year old (1945), who is being seen today for an episodic care visit.  HPI information obtained from: facility chart records, facility staff, patient report, [facility] chart review and Care Everywhere [facility] chart review. Today's concern is:  Essential hypertension  Chronic combined systolic and diastolic CHF (H)  Chronic atrial fibrillation (H)  Cerebrovascular accident (CVA) due to embolism of left anterior cerebral artery (H)  Carotid stenosis, bilateral  Frequent falls  Nursing consulted provider today regarding patient's hypertension, which appears to wax and wanes historically. She has been hospitalized in the past w/ hypertensive crisis. Today, patient denies headache, dizziness, light-headedness, CP. Her BP is regularly controlled w/ Digoxin and BID Clonidine. In the recent past, Diltiazem and Metoprolol were discontinued after she became bradycardic during an inpatient stay. No previous record of hydrochlorothiazide or lisinopril found. Last creatinine was 0.87 and K was 4.1 (April 2019), HR trend is 60-80bpm and BP trend is noted below:  9/9/2019 17:03 120 / 92 mmHg   9/9/2019 08:47 189 / 109 mmHg   9/8/2019 17:18 210 / 185 mmHg   9/8/2019 07:34 143 / 91 mmHg   9/7/2019 17:42 119 / 92 mmHg   9/7/2019 07:57 149 / 77 mmHg   9/6/2019 17:18 145 / 100 mmHg  9/6/2019 07:40 142 / 66 mmHg   9/5/2019 19:00 144 / 80 mmHg    9/5/2019 09:47 149 / 84 mmHg   9/4/2019 19:08 160 / 114 mmHg   9/4/2019 07:16 163 / 104 mmHg   9/3/2019 17:42 164 / 86 mmHg   9/3/2019 07:58 159 / 97 mmHg  9/2/2019 17:15 161 / 92 mmHg     Past Medical and Surgical History reviewed in Epic today.  REVIEW OF SYSTEMS: Limited secondary to cognitive impairment but today pt reports the above and 4  point ROS including Respiratory, CV, GI and , other than that noted in the HPI, is negative.    Objective:  /84   Pulse 78   Temp 97.1  F (36.2  C)   Resp 18   Wt 58.7 kg (129 lb 6.4 oz)   SpO2 98%   BMI 20.27 kg/m    Exam:  GENERAL APPEARANCE: Alert, in no distress, cooperative.   ENT: Mouth/posterior oropharynx intact w/ moist mucous membranes, hearing acuity [place].  EYES: EOM, conjunctivae, lids, pupils and irises normal, PERRL2.   RESP: Respiratory effort good, no respiratory distress, Lung sounds clear. On RA.   CV: Auscultation of heart reveals S1, S2, rate-controlled and rhythm irregular, 2/6 systolic murmur, no rub or gallop, Edema 0+ BLE. Peripheral pulses are 2+.  ABDOMEN: Normal bowel sounds, soft, non-tender abdomen, and no masses palpated.  SKIN: Inspection/Palpation of skin and subcutaneous tissue baseline w/ fragility. No wounds/rashes noted.   NEURO: CN II-XII at patient's baseline, sensation baseline PPS.  PSYCH: Insight, judgement, and memory are baseline impaired, affect and mood are happy/tired.    Labs:   Recent labs in [facility] reviewed by me today.     ASSESSMENT/PLAN:  Chronic combined systolic and diastolic CHF (H)  Essential hypertension  Chronic atrial fibrillation (H)  Cerebrovascular accident (CVA) due to embolism of left anterior cerebral artery (H)  Carotid stenosis, bilateral  Frequent falls  Acute-on-chronic. Tenuous and labile. Noting both hypotension and hypertension on record. Will add hold parameters to current BP medications, will add low-dose Lisinopril for both CHF and HTN. Will recheck BMP as noted below. Follow-up w/in 6 weeks.     Orders written by provider at facility and transcribed by : Bubba Diaz  1. Lisinopril 2.5 mg PO every day Dx:: HTN  2. Recheck BMP x1 on 9/18 Dx: HTN  3. Hold Digoxin if HR <60 or SBP <100. Dx: HTN.  4. Hold Clonidine if SBP <100. Dx: HTN.     Electronically signed by:  Dr. Jennifer Richards, APRN CNP DNP

## 2019-09-10 NOTE — LETTER
9/10/2019        RE: Citlalli Villarreal  [facility]  [address] [facility] [address]        [place] [facility]  [place] Medical Record Number:  [id number]  Place of Service where encounter took place:  [facility]  ([facility]GS) [[id number]]  Chief Complaint   Patient presents with     RECHECK     HPI: Citlalli Villarreal  is a 73 year old (1945), who is being seen today for an episodic care visit.  HPI information obtained from: facility chart records, facility staff, patient report, [facility] chart review and Care Everywhere [facility] chart review. Today's concern is:  Essential hypertension  Chronic combined systolic and diastolic CHF (H)  Chronic atrial fibrillation (H)  Cerebrovascular accident (CVA) due to embolism of left anterior cerebral artery (H)  Carotid stenosis, bilateral  Frequent falls  Nursing consulted provider today regarding patient's hypertension, which appears to wax and wanes historically. She has been hospitalized in the past w/ hypertensive crisis. Today, patient denies headache, dizziness, light-headedness, CP. Her BP is regularly controlled w/ Digoxin and BID Clonidine. In the recent past, Diltiazem and Metoprolol were discontinued after she became bradycardic during an inpatient stay. No previous record of hydrochlorothiazide or lisinopril found. Last creatinine was 0.87 and K was 4.1 (April 2019), HR trend is 60-80bpm and BP trend is noted below:  9/9/2019 17:03 120 / 92 mmHg   9/9/2019 08:47 189 / 109 mmHg   9/8/2019 17:18 210 / 185 mmHg   9/8/2019 07:34 143 / 91 mmHg   9/7/2019 17:42 119 / 92 mmHg   9/7/2019 07:57 149 / 77 mmHg   9/6/2019 17:18 145 / 100 mmHg  9/6/2019 07:40 142 / 66 mmHg   9/5/2019 19:00 144 / 80 mmHg    9/5/2019 09:47 149 / 84 mmHg   9/4/2019 19:08 160 / 114 mmHg   9/4/2019 07:16 163 / 104 mmHg   9/3/2019 17:42 164 / 86 mmHg   9/3/2019 07:58 159 / 97 mmHg  9/2/2019 17:15 161 / 92 mmHg     Past Medical and Surgical History reviewed  in Epic today.  REVIEW OF SYSTEMS: Limited secondary to cognitive impairment but today pt reports the above and 4 point ROS including Respiratory, CV, GI and , other than that noted in the HPI, is negative.    Objective:  /84   Pulse 78   Temp 97.1  F (36.2  C)   Resp 18   Wt 58.7 kg (129 lb 6.4 oz)   SpO2 98%   BMI 20.27 kg/m     Exam:  GENERAL APPEARANCE: Alert, in no distress, cooperative.   ENT: Mouth/posterior oropharynx intact w/ moist mucous membranes, hearing acuity [place].  EYES: EOM, conjunctivae, lids, pupils and irises normal, PERRL2.   RESP: Respiratory effort good, no respiratory distress, Lung sounds clear. On RA.   CV: Auscultation of heart reveals S1, S2, rate-controlled and rhythm irregular, 2/6 systolic murmur, no rub or gallop, Edema 0+ BLE. Peripheral pulses are 2+.  ABDOMEN: Normal bowel sounds, soft, non-tender abdomen, and no masses palpated.  SKIN: Inspection/Palpation of skin and subcutaneous tissue baseline w/ fragility. No wounds/rashes noted.   NEURO: CN II-XII at patient's baseline, sensation baseline PPS.  PSYCH: Insight, judgement, and memory are baseline impaired, affect and mood are happy/tired.    Labs:   Recent labs in [facility] reviewed by me today.     ASSESSMENT/PLAN:  Chronic combined systolic and diastolic CHF (H)  Essential hypertension  Chronic atrial fibrillation (H)  Cerebrovascular accident (CVA) due to embolism of left anterior cerebral artery (H)  Carotid stenosis, bilateral  Frequent falls  Acute-on-chronic. Tenuous and labile. Noting both hypotension and hypertension on record. Will add hold parameters to current BP medications, will add low-dose Lisinopril for both CHF and HTN. Will recheck BMP as noted below. Follow-up w/in 6 weeks.     Orders written by provider at facility and transcribed by : Bubba Diaz  1. Lisinopril 2.5 mg PO every day Dx:: HTN  2. Recheck BMP x1 on 9/18 Dx: HTN  3. Hold Digoxin if HR <60 or SBP <100. Dx: HTN.  4. Hold  Clonidine if SBP <100. Dx: HTN.     Electronically signed by:  Dr. Jennifer Richards, APRN CNP DNP        Sincerely,        JADA Branham CNP

## 2019-09-18 NOTE — LETTER
9/18/2019        RE: Citlalli Villarreal  [facility]  [address] [facility] [address]        [place] [facility]  [place] Medical Record Number:  [id number]  Place of Service where encounter took place:  [facility]  ([facility]GS) [[id number]]  Chief Complaint   Patient presents with     Nursing Home Acute   HPI: Citlalli Villarreal  is a 73 year old (1945), who is being seen today for an episodic care visit.  HPI information obtained from: facility chart records, facility staff, patient report, [facility] chart review and Care Everywhere [facility] chart review. Today's concern is:    Essential hypertension  Chronic combined systolic and diastolic CHF (H)  Chronic atrial fibrillation (H)  Cerebrovascular accident (CVA) due to embolism of left anterior cerebral artery (H)  Carotid stenosis, bilateral  Frequent falls  Nursing consulted provider about 1.5 weeks ago regarding patient's hypertension, which appears to wax and wanes historically. She has been hospitalized in the past w/ hypertensive crisis. Today, patient denies headache, dizziness, light-headedness, CP. In the recent past, Diltiazem and Metoprolol were discontinued after she became bradycardic during an inpatient stay. Last visit, we started low-dose Lisinopril. HR trend is 60-80bpm and BP trend is noted below:  9/18/2019 19:18 100/71 mmHg   9/18/2019 08:47 157/91 mmHg   9/17/2019 09:35 156/108 mmHg   9/16/2019 18:45 154/109 mmHg   9/16/2019 09:07 141/108 mmHg   9/15/2019 08:36 161/104 mmHg   9/14/2019 17:25 106 / 72 mmHg   9/14/2019 08:36 112 / 83 mmHg   9/13/2019 17:13 144 / 84 mmHg   9/13/2019 07:04 141 / 86 mmHg    Past Medical and Surgical History reviewed in [facility] today.  REVIEW OF SYSTEMS: Limited secondary to cognitive impairment but today pt reports the above and 4 point ROS including Respiratory, CV, GI and , other than that noted in the HPI, is negative.    Objective:  BP (!) 157/91   Pulse 92   Temp 97.8  F  (36.6  C)   Resp 16   Wt 57.1 kg (125 lb 12.8 oz)   SpO2 96%   BMI 19.70 kg/m     Exam:  GENERAL APPEARANCE: Alert, in no distress, cooperative.   ENT: Mouth/posterior oropharynx intact w/ moist mucous membranes, hearing acuity [place].  EYES: EOM, conjunctivae, lids, pupils and irises normal, PERRL2.   RESP: Respiratory effort good, no respiratory distress, Lung sounds clear. On RA.   CV: Auscultation of heart reveals S1, S2, rate-controlled and rhythm irregular, 2/6 systolic murmur, no rub or gallop, Edema 0+ BLE. Peripheral pulses are 2+.  ABDOMEN: Normal bowel sounds, soft, non-tender abdomen, and no masses palpated.  SKIN: Inspection/Palpation of skin and subcutaneous tissue baseline w/ fragility. No wounds/rashes noted.   NEURO: CN II-XII at patient's baseline, sensation baseline PPS.  PSYCH: Insight, judgement, and memory are baseline impaired, affect and mood are happy/tired.    Labs:       ASSESSMENT/PLAN:  Chronic combined systolic and diastolic CHF (H)  Essential hypertension  Chronic atrial fibrillation (H)  Cerebrovascular accident (CVA) due to embolism of left anterior cerebral artery (H)  Carotid stenosis, bilateral  Frequent falls  Chronic. Stable. Will give this new dosing more time and consider change in dose if HTN persist. Will be judicious given this patient is ambulatory. Follow-up w/in 1 month.    Orders:  No new orders.    Electronically signed by:  JADA Spence CNP [facility]      Sincerely,        JADA Branham CNP

## 2019-09-18 NOTE — PROGRESS NOTES
[place] [facility]  [place] Medical Record Number:  [id number]  Place of Service where encounter took place:  [facility]  (FGS) [[id number]]  Chief Complaint   Patient presents with     Nursing Home Acute   HPI: Citlalli Villarreal  is a 73 year old (1945), who is being seen today for an episodic care visit.  HPI information obtained from: facility chart records, facility staff, patient report, [facility] chart review and Care Everywhere [facility] chart review. Today's concern is:    Essential hypertension  Chronic combined systolic and diastolic CHF (H)  Chronic atrial fibrillation (H)  Cerebrovascular accident (CVA) due to embolism of left anterior cerebral artery (H)  Carotid stenosis, bilateral  Frequent falls  Nursing consulted provider about 1.5 weeks ago regarding patient's hypertension, which appears to wax and wanes historically. She has been hospitalized in the past w/ hypertensive crisis. Today, patient denies headache, dizziness, light-headedness, CP. In the recent past, Diltiazem and Metoprolol were discontinued after she became bradycardic during an inpatient stay. Last visit, we started low-dose Lisinopril. HR trend is 60-80bpm and BP trend is noted below:  9/18/2019 19:18 100/71 mmHg   9/18/2019 08:47 157/91 mmHg   9/17/2019 09:35 156/108 mmHg   9/16/2019 18:45 154/109 mmHg   9/16/2019 09:07 141/108 mmHg   9/15/2019 08:36 161/104 mmHg   9/14/2019 17:25 106 / 72 mmHg   9/14/2019 08:36 112 / 83 mmHg   9/13/2019 17:13 144 / 84 mmHg   9/13/2019 07:04 141 / 86 mmHg    Past Medical and Surgical History reviewed in [facility] today.  REVIEW OF SYSTEMS: Limited secondary to cognitive impairment but today pt reports the above and 4 point ROS including Respiratory, CV, GI and , other than that noted in the HPI, is negative.    Objective:  BP (!) 157/91   Pulse 92   Temp 97.8  F (36.6  C)   Resp 16   Wt 57.1 kg (125 lb 12.8 oz)   SpO2 96%   BMI 19.70 kg/m    Exam:  GENERAL APPEARANCE:  Alert, in no distress, cooperative.   ENT: Mouth/posterior oropharynx intact w/ moist mucous membranes, hearing acuity [place].  EYES: EOM, conjunctivae, lids, pupils and irises normal, PERRL2.   RESP: Respiratory effort good, no respiratory distress, Lung sounds clear. On RA.   CV: Auscultation of heart reveals S1, S2, rate-controlled and rhythm irregular, 2/6 systolic murmur, no rub or gallop, Edema 0+ BLE. Peripheral pulses are 2+.  ABDOMEN: Normal bowel sounds, soft, non-tender abdomen, and no masses palpated.  SKIN: Inspection/Palpation of skin and subcutaneous tissue baseline w/ fragility. No wounds/rashes noted.   NEURO: CN II-XII at patient's baseline, sensation baseline PPS.  PSYCH: Insight, judgement, and memory are baseline impaired, affect and mood are happy/tired.    Labs:       ASSESSMENT/PLAN:  Chronic combined systolic and diastolic CHF (H)  Essential hypertension  Chronic atrial fibrillation (H)  Cerebrovascular accident (CVA) due to embolism of left anterior cerebral artery (H)  Carotid stenosis, bilateral  Frequent falls  Chronic. Stable. Will give this new dosing more time and consider change in dose if HTN persist. Will be judicious given this patient is ambulatory. Follow-up w/in 1 month.    Orders:  No new orders.    Electronically signed by:  JADA Spence CNP DNP

## 2019-10-18 NOTE — PROGRESS NOTES
[place] [facility]  Chief Complaint   Patient presents with     custodial Regulatory   [place] Medical Record Number:  [id number]  Place of Service where encounter took place:  [facility]  ([facility]) [[id number]]    HPI:    Citlalli Villarreal is a 73 year old  (1945), who is being seen today for a federally mandated E/M visit.  HPI information obtained from: facility chart records, facility staff, patient report and [facility] Epic chart review.     Today's concerns are:  - DNP reports ongoing HTN, hence started on small dose of lisinopril, BMP repeated and wnl, added parameter for digoxin and clonidine.   - Nursing manager reports  Over the weekend, was agitated, threw a book at the computer, has some paranoia and hx of hoarding.   - Resident seen and examined.  Reports doing fine; appetite, sleep and BM are fine. Denies chest pain, palpitation.     ....................................................................................  - Past Medical, social, family histories, medications, and allergies reviewed and updated  - Medications reviewed: in the chart and EHR.   - Case Management:   I have reviewed the care plan and MDS and do agree with the plan. Patient's desire to return to the community is not present.  Information reviewed:  Medications, vital signs, orders, and nursing notes.    MEDICATIONS:  Current Outpatient Medications   Medication Sig Dispense Refill     acetaminophen (TYLENOL) 325 MG tablet Take 2 tablets (650 mg) by mouth 3 times daily       apixaban ANTICOAGULANT (ELIQUIS) 5 MG tablet Take 1 tablet (5 mg) by mouth 2 times daily       aspirin (ASA) 81 MG tablet Take 1 tablet (81 mg) by mouth daily  OTC     atorvastatin (LIPITOR) 40 MG tablet Take 20 mg by mouth every morning        cloNIDine (CATAPRES) 0.2 MG tablet Take 1 tablet (0.2 mg) by mouth 2 times daily       digoxin (LANOXIN) 125 MCG tablet Take 1 tablet (125 mcg) by mouth daily       gabapentin (NEURONTIN) 100 MG  capsule Take 100 mg by mouth At Bedtime       lisinopril (PRINIVIL/ZESTRIL) 2.5 MG tablet Take 1 tablet (2.5 mg) by mouth daily       MULTIPLE VITAMINS-MINERALS PO Take 1 tablet by mouth daily       omeprazole 20 MG tablet Take 20 mg by mouth daily       polyethylene glycol (MIRALAX/GLYCOLAX) packet Take 17 g by mouth daily       senna (SENOKOT) 8.6 MG tablet Take 1 tablet by mouth 2 times daily       sertraline (ZOLOFT) 25 MG tablet Take 75 mg by mouth daily        ROS: Limited secondary to cognitive impairment but today pt reports- see HPI    Exam:  Vitals: /87   Pulse 66   Temp 97.8  F (36.6  C)   Resp 16   Wt 56.2 kg (124 lb)   SpO2 98%   BMI 19.42 kg/m    BMI= Body mass index is 19.42 kg/m .  GENERAL APPEARANCE:  Alert, in no distress  RESP:  Lungs clear to auscultation   CV:  S1S2 normal,  no edema, irregular and rapid HR . no murmur.   ABDOMEN:  normal bowel sounds, soft, nontender, no palpable masses  M/S:   Gait and station abnormal uses WC.   SKIN: no rash noted  NEURO:   Hand : 4/5 R, 5/5 L.   no purposeful movement in upper and lower extremities  PSYCH: mood and affect appropriate.      Lab/Diagnostic data: Reviewed in the chart and EHR.      ASSESSMENT/PLAN  --------------------------------  Hx of CVA due to embolism of left anterior cerebral artery   History of embolic stroke with hemorrhagic conversion 10/15/2018.  Atrial fibrillation, chronic (H)  Carotid stenosis, bilateral  Hypertensive Heart Disease:  - had embolic stroke with A-fib with RVR (March 2019). Repeated CT brain with multiple old infarcts. Tariq risk for bleeding and stroke, started on Eliquis.   - at baseline, still on Eliquis  - Rt was taken off amiodarone in the past for failing to convert to sinus,  and digoxin (query amyloidosis). However, had recurrent A-fib with RVR required hospitalization x2, developed bradycardia hence metoprolol and diltiazem stopped and started on Digoxin (April 2019). Recently with elevated  BP, small dose of lisinopril added.   - We recommend to start small dose of metoprolol tartrate 12.5 mg bid, and add as tolerate at the same time, lowering digoxin.     Hx of Wrist fracture, right, closed,   Hx of Closed fracture of distal end of right radius with routine healing, unspecified fracture morphology,   - analgesia at  Baseline.      Hx of Gastrointestinal hemorrhage: at baseline. On omeprazole     Dementia with behavioral disturbance (H)  Paranoia  (H)  - Sertraline was increased from 25 mg to 50 mg 10/10. Given ongoing behavior, will further increase the dose to 75 mg.   - Continue to anticipate needs. Chronic condition, ongoing decline expected.   -  Continue to provide redirection and reassurance as needed. Maintain safe living situation with goals focused on comfort.      Orders written by provider at facility and transcribed by :  1. Increase Zoloft from 50 mg to 75 mg PO daily  2. see above recommendations, otherwise no new order and continue current management plane.       Electronically signed by:  Pasquale Johnson MD

## 2019-10-21 NOTE — LETTER
10/21/2019        RE: Citlalli Villarreal  [facility]  [address] [facility] [address]        [place] [facility]  Chief Complaint   Patient presents with     [facility] Regulatory   [place] [facility] Record Number:  [id number]  Place of Service where encounter took place:  [facility]  ([facility]) [[id number]]    HPI:    Citlalli Villarreal is a 73 year old  (1945), who is being seen today for a federally mandated E/M visit.  HPI information obtained from: facility chart records, facility staff, patient report and [facility] chart review.     Today's concerns are:  - DNP reports ongoing HTN, hence started on small dose of lisinopril, BMP repeated and wnl, added parameter for digoxin and clonidine.   - Nursing manager reports  Over the weekend, was agitated, threw a book at the computer, has some paranoia and hx of hoarding.   - Resident seen and examined.  Reports doing fine; appetite, sleep and BM are fine. Denies chest pain, palpitation.     ....................................................................................  - Past Medical, social, family histories, medications, and allergies reviewed and updated  - Medications reviewed: in the chart and EHR.   - Case Management:   I have reviewed the care plan and MDS and do agree with the plan. Patient's desire to return to the community is not present.  Information reviewed:  Medications, vital signs, orders, and nursing notes.    MEDICATIONS:  Current Outpatient Medications   Medication Sig Dispense Refill     acetaminophen (TYLENOL) 325 MG tablet Take 2 tablets (650 mg) by mouth 3 times daily       apixaban ANTICOAGULANT (ELIQUIS) 5 MG tablet Take 1 tablet (5 mg) by mouth 2 times daily       aspirin (ASA) 81 MG tablet Take 1 tablet (81 mg) by mouth daily  OTC     atorvastatin (LIPITOR) 40 MG tablet Take 20 mg by mouth every morning        cloNIDine (CATAPRES) 0.2 MG tablet Take 1 tablet (0.2 mg) by mouth 2 times  daily       digoxin (LANOXIN) 125 MCG tablet Take 1 tablet (125 mcg) by mouth daily       gabapentin (NEURONTIN) 100 MG capsule Take 100 mg by mouth At Bedtime       lisinopril (PRINIVIL/ZESTRIL) 2.5 MG tablet Take 1 tablet (2.5 mg) by mouth daily       MULTIPLE VITAMINS-MINERALS PO Take 1 tablet by mouth daily       omeprazole 20 MG tablet Take 20 mg by mouth daily       polyethylene glycol (MIRALAX/GLYCOLAX) packet Take 17 g by mouth daily       senna (SENOKOT) 8.6 MG tablet Take 1 tablet by mouth 2 times daily       sertraline (ZOLOFT) 25 MG tablet Take 75 mg by mouth daily        ROS: Limited secondary to cognitive impairment but today pt reports- see HPI    Exam:  Vitals: /87   Pulse 66   Temp 97.8  F (36.6  C)   Resp 16   Wt 56.2 kg (124 lb)   SpO2 98%   BMI 19.42 kg/m     BMI= Body mass index is 19.42 kg/m .  GENERAL APPEARANCE:  Alert, in no distress  RESP:  Lungs clear to auscultation   CV:  S1S2 normal,  no edema, irregular and rapid HR . no murmur.   ABDOMEN:  normal bowel sounds, soft, nontender, no palpable masses  M/S:   Gait and station abnormal uses WC.   SKIN: no rash noted  NEURO:   Hand : 4/5 R, 5/5 L.   no purposeful movement in upper and lower extremities  PSYCH: mood and affect appropriate.      Lab/Diagnostic data: Reviewed in the chart and EHR.      ASSESSMENT/PLAN  --------------------------------  Hx of CVA due to embolism of left anterior cerebral artery   History of embolic stroke with hemorrhagic conversion 10/15/2018.  Atrial fibrillation, chronic (H)  Carotid stenosis, bilateral  Hypertensive Heart Disease:  - had embolic stroke with A-fib with RVR (March 2019). Repeated CT brain with multiple old infarcts. Tariq risk for bleeding and stroke, started on Eliquis.   - at baseline, still on Eliquis  - Rt was taken off amiodarone in the past for failing to convert to sinus,  and digoxin (query amyloidosis). However, had recurrent A-fib with RVR required hospitalization x2,  developed bradycardia hence metoprolol and diltiazem stopped and started on Digoxin (April 2019). Recently with elevated BP, small dose of lisinopril added.   - We recommend to start small dose of metoprolol tartrate 12.5 mg bid, and add as tolerate at the same time, lowering digoxin.     Hx of Wrist fracture, right, closed,   Hx of Closed fracture of distal end of right radius with routine healing, unspecified fracture morphology,   - analgesia at  Baseline.      Hx of Gastrointestinal hemorrhage: at baseline. On omeprazole     Dementia with behavioral disturbance (H)  Paranoia  (H)  - Sertraline was increased from 25 mg to 50 mg 10/10. Given ongoing behavior, will further increase the dose to 75 mg.   - Continue to anticipate needs. Chronic condition, ongoing decline expected.   -  Continue to provide redirection and reassurance as needed. Maintain safe living situation with goals focused on comfort.      Orders written by provider at facility and transcribed by :  1. Increase Zoloft from 50 mg to 75 mg PO daily  2. see above recommendations, otherwise no new order and continue current management plane.       Electronically signed by:  Pasquale Johnson MD          Sincerely,        Pasquale Johnson MD

## 2019-11-10 PROBLEM — R07.9 CHEST PAIN: Status: ACTIVE | Noted: 2019-01-01

## 2019-11-10 NOTE — PROGRESS NOTES
"The following page was sent to Dr. Pickens:    \"/100, 141/129 in ED, tele in place. Hx of CHF, do you want her on NS @ 125? Do we want repeat Trops?\"    Verbal update given to appropriate MD, Dr. Lua.   "

## 2019-11-10 NOTE — ED NOTES
"Pt comes in from memory care unit at [facility]  Per staff she has not been feeling well the past few days and last noc she had irregular HR and HTN. Pt was refusing to come in at that time. Pt states \" I have been having diarrhea for the past 7 days, with N/v and chest pain. Pt has HX of dementia which makes it hard to get a good HX. She is pleasant and doesn't seem to be in distress. Changed into gown, warm blankets given and lights turn down low. Monitor   "

## 2019-11-10 NOTE — PROGRESS NOTES
"[facility] ADMISSION NOTE    Patient admitted to room 2304 at approximately 1459 via wheel chair from emergency room. Patient was accompanied by transport tech.     Verbal SBAR report received from KALI Brown prior to patient arrival.     Patient ambulated to bed with stand-by assist. Patient alert and oriented X 2. The patient is not having any pain.  . Admission vital signs: Blood pressure (!) 141/129, pulse 74, temperature 98.2  F (36.8  C), temperature source Oral, resp. rate 18, height 1.702 m (5' 7\"), weight 57.1 kg (125 lb 14.1 oz), SpO2 100 %, not currently breastfeeding. Patient was oriented to plan of care, call light, bed controls, tv, telephone, bathroom and visiting hours.     Risk Assessment    The following safety risks were identified during admission: fall and skin. Yellow risk band applied: YES.     Skin Initial Assessment    Skin assessment will be deferred to next shift as pt just arrived to room.      Education    Patient has a [place] to Observation order: Yes  Observation education completed and documented: Yes      Dona Bui RN    "

## 2019-11-10 NOTE — ED NOTES
Pt is c/o of being SOB and heart bounding after returning to bed from using BSC. She was up to BSC with assist of two. Going to give meds and check influenza

## 2019-11-10 NOTE — ED PROVIDER NOTES
History     Chief Complaint   Patient presents with     Nausea, Vomiting, & Diarrhea     HPI  Citlalli Villarreal is a 73 year old female, past medical history is significant for atrial fibrillation, CVA, hypertension, CHF, long-term anticoagulant therapy with Eliquis, expressive a aphasia, embolic stroke due to atrial fibrillation, chronic constipation, GERD, cerebral infarction, dementia, chronic idiopathic constipation, hyperlipidemia, recurrent falls, traumatic rhabdomyolysis, intracranial bleed, presents to the emergency department concerns of nausea, vomiting, diarrhea.  History is obtained from the patient, EMS, and transfer/discharge report from [facility].  History from the patient is of likely limited utility given the patient's significant cognitive impairment.  The patient states that she has had nausea, vomiting, diarrhea more episodes than she can count over the last 7 days, central chest pain and shortness of air over the last 4 days.  Per EMS the patient described chest pain and shortness of air at around 4:00 this morning per their conversation with nursing home staff.  The patient does have a valid pulsed form stating DNR, comfort focused treatment, when asked originally at the time of chest pain per nursing home she did not want to be transported to the ED she has since changed her mind.  Her power of  was contacted by the nursing home staff and deferred to the patient if she wants to go in for evaluation or not.  The patient states ongoing chest pain for the last 4 days,  She is unable/unwilling to quantify it just says that he keeps going on and on.  She is smiling and happy and pleasant at the time.      Allergies:  No Known Allergies    Problem List:    Patient Active Problem List    Diagnosis Date Noted     Chest pain 11/10/2019     Priority: Medium     Leukocytosis 04/15/2019     Priority: Medium     Acute renal injury due to hypovolemia (H) 04/15/2019     Priority:  Medium     Rapid atrial fibrillation (H) 04/13/2019     Priority: Medium     A-fib (H) 04/13/2019     Priority: Medium     Cerebrovascular accident (CVA) due to embolism of left anterior cerebral artery (H) 04/05/2019     Priority: Medium     Essential hypertension 04/05/2019     Priority: Medium     CHF (congestive heart failure) (H) 03/22/2019     Priority: Medium     Hypertensive heart disease without heart failure 03/22/2019     Priority: Medium     Chronic atrial fibrillation 03/22/2019     Priority: Medium     Long term current use of anticoagulant therapy 03/22/2019     Priority: Medium     Carotid stenosis, bilateral 03/22/2019     Priority: Medium     Expressive aphasia 03/10/2019     Priority: Medium     Embolic stroke due to atrial fibrillation (H) 03/10/2019     Priority: Medium     History of embolic stroke with hemorrhagic conversion 10/15/2018. 03/10/2019     Priority: Medium     Chronic constipation 03/09/2019     Priority: Medium     Gastroesophageal reflux disease with esophagitis 03/09/2019     Priority: Medium     Atrial fibrillation with RVR (H) 03/06/2019     Priority: Medium     Cerebral infarction (H)      Priority: Medium     Current moderate episode of major depressive disorder, unspecified whether recurrent (H) 01/02/2019     Priority: Medium     Dementia without behavioral disturbance, unspecified dementia type (H) 01/02/2019     Priority: Medium     Chronic idiopathic constipation 12/13/2018     Priority: Medium     Cognitive impairment 11/21/2018     Priority: Medium     Gastrointestinal hemorrhage, unspecified gastrointestinal hemorrhage type 11/21/2018     Priority: Medium     Altered mental status, unspecified altered mental status type 10/16/2018     Priority: Medium     Carotid stenosis, asymptomatic, bilateral 10/16/2018     Priority: Medium     Deny-neglect of right side 10/16/2018     Priority: Medium     Intracranial atherosclerosis 10/16/2018     Priority: Medium     Overview:    Severe and diffuse       Compression of brain (H) 10/15/2018     Priority: Medium     HLD (hyperlipidemia) 10/15/2018     Priority: Medium     Recurrent falls 10/15/2018     Priority: Medium     Traumatic rhabdomyolysis, initial encounter (H) 10/15/2018     Priority: Medium     Fracture of right distal radius 10/14/2018     Priority: Medium     Overview:   Added automatically from request for surgery [id number]       Intracranial bleed (H) 10/14/2018     Priority: Medium        Past Medical History:    Past Medical History:   Diagnosis Date     Atrial fibrillation (H)      CVA (cerebral vascular accident) (H)      Dementia (H)      Gastroesophageal reflux disease      History of embolic stroke with hemorrhagic conversion 10/15/2018. 3/10/2019     Hyperlipemia      Hypertension      Major depressive disorder      Occlusion and stenosis of bilateral carotid arteries      Repeated falls      Right wrist fracture 10/2018       Past Surgical History:    Past Surgical History:   Procedure Laterality Date     FOREARM SURGERY Right 10/2018    fracture repair       Family History:    Family History   Problem Relation Age of Onset     No Known Problems Mother      No Known Problems Father        Social History:  Marital Status:  Single [1]  Social History     Tobacco Use     Smoking status: Former Smoker     Packs/day: 0.00     Smokeless tobacco: Never Used     Tobacco comment: quit 20 years ago   Substance Use Topics     Alcohol use: No     Frequency: Never     Drug use: No        Medications:    acetaminophen (TYLENOL) 325 MG tablet  apixaban ANTICOAGULANT (ELIQUIS) 5 MG tablet  aspirin (ASA) 81 MG tablet  atorvastatin (LIPITOR) 40 MG tablet  cloNIDine (CATAPRES) 0.2 MG tablet  digoxin (LANOXIN) 125 MCG tablet  gabapentin (NEURONTIN) 100 MG capsule  lisinopril (PRINIVIL/ZESTRIL) 2.5 MG tablet  MULTIPLE VITAMINS-MINERALS PO  nitroGLYcerin (NITROSTAT) 0.4 MG sublingual tablet  omeprazole 20 MG tablet  polyethylene glycol  "(MIRALAX/GLYCOLAX) packet  senna (SENOKOT) 8.6 MG tablet  sertraline (ZOLOFT) 25 MG tablet          Review of Systems   All other systems reviewed and are negative.      Physical Exam   BP: (!) 119/112  Pulse: 149  Heart Rate: 125  Temp: 98.7  F (37.1  C)  Resp: 16  Height: 170.2 cm (5' 7\")  Weight: 56.7 kg (125 lb)  SpO2: 98 %      Physical Exam  Vitals signs and nursing note reviewed.   Constitutional:       Comments: Appears stated age, no acute distress.  Does not appear uncomfortable or in pain.   HENT:      Head: Normocephalic and atraumatic.      Right Ear: Tympanic membrane, ear canal and external ear normal.      Left Ear: Tympanic membrane, ear canal and external ear normal.      Nose: Nose normal.      Mouth/Throat:      Mouth: Mucous membranes are moist.      Pharynx: Oropharynx is clear.   Eyes:      Extraocular Movements: Extraocular movements intact.      Conjunctiva/sclera: Conjunctivae normal.      Pupils: Pupils are equal, round, and reactive to light.   Neck:      Musculoskeletal: Normal range of motion and neck supple.   Cardiovascular:      Rate and Rhythm: Tachycardia present. Rhythm irregular.      Pulses: Normal pulses.   Pulmonary:      Effort: Pulmonary effort is normal.   Abdominal:      General: Abdomen is flat. Bowel sounds are normal.      Palpations: Abdomen is soft.   Musculoskeletal: Normal range of motion.      Right lower leg: No edema.      Left lower leg: No edema.   Skin:     General: Skin is warm and dry.      Capillary Refill: Capillary refill takes less than 2 seconds.   Neurological:      Mental Status: She is alert.   Psychiatric:         Mood and Affect: Mood normal.         Behavior: Behavior normal.         ED Course     ED Course as of Nov 11 1852   Sun Nov 10, 2019   1318 Lipase   1332 Digoxin level(!)     Procedures               EKG Interpretation:      Interpreted by Jordy Guerra MD  Time reviewed: Time obtained 1016 time interpreted 1027 atrial " fibrillation with RVR ventricular rate 146 bpm ST segment depression in leads V4 through V6 to and aVF no acute ST segment elevation.  Comparison cardiogram dated 4/13/2019 shows similar changes with previous presentation with SWAPNA. lul with RVR.    Clinical Impression: normal EKG          Critical Care time:  none               Results for orders placed or performed during the hospital encounter of 11/10/19 (from the past 24 hour(s))   Glucose by meter   Result Value Ref Range    Glucose 143 (H) 70 - 99 mg/dL   Troponin I   Result Value Ref Range    Troponin I ES 6.027 (HH) 0.000 - 0.045 ug/L       Medications   sodium chloride 0.9% infusion ( Intravenous New Bag 11/10/19 1854)   lactated ringers BOLUS 1,000 mL (0 mLs Intravenous Stopped 11/10/19 1133)   ondansetron (ZOFRAN) injection 4 mg (4 mg Intravenous Given 11/10/19 1235)   iopamidol (ISOVUE-370) solution 60 mL ( Intravenous Given 11/10/19 1051)   sodium chloride 0.9 % bag 500mL for CT scan flush use (100 mLs As instructed Given 11/10/19 1052)   lactated ringers BOLUS 500 mL (0 mLs Intravenous Stopped 11/10/19 1232)   lactated ringers BOLUS 500 mL (0 mLs Intravenous Stopped 11/10/19 1433)   metoprolol (LOPRESSOR) injection 5 mg (5 mg Intravenous Given 11/10/19 1233)   digoxin (LANOXIN) injection 250 mcg (250 mcg Intravenous Given 11/10/19 1447)   metoprolol (LOPRESSOR) injection 2.5 mg (2.5 mg Intravenous Given 11/10/19 1852)   1:38 PM  I reviewed this patient with the on-call hospitalist  is comfortable excepting the patient to observation status.  After discussing the patient presentation today as well as her previous admission in 4/19 we agreed not to administer any further beta-blocker or calcium channel blocker but rather to give the patient a digoxin load beginning with .25 mg dose intravenously in the emergency department.  I note that her heart rate has decreased already just with cautious small bolus hydration.  I think this is consistent with  the clinical impression that the patient is dry.  We discussed the abnormalities on CT with respect to the patient's chest at this point will hold off on any antimicrobials as I suspect that her findings on CT are noninfectious given her non-elevation of white cell count, no fever, no elevation of lactate.  Suspect patient has had some nausea vomiting and diarrhea, is volume depleted and in A. fib with RVR.  Transition orders will be placed by myself.      Assessments & Plan (with Medical Decision Making)     I have reviewed the nursing notes.    I have reviewed the findings, diagnosis, plan and need for follow up with the patient.       Discharge Medication List as of 11/11/2019  9:35 AM      START taking these medications    Details   nitroGLYcerin (NITROSTAT) 0.4 MG sublingual tablet For chest pain place 1 tablet under the tongue every 5 minutes for 3 doses. If symptoms persist 5 minutes after 1st dose call 911., Transitional             Final diagnoses:   Nausea and vomiting, intractability of vomiting not specified, unspecified vomiting type   Diarrhea, unspecified type   Dehydration   Atrial fibrillation with RVR (H)       11/10/2019   [facility] EMERGENCY DEPARTMENT     Jordy Guerra MD  11/11/19 2908

## 2019-11-10 NOTE — ED NOTES
Pt medicated for continued nausea, no vomiting since admit.  Agueda has called and has been updated on pts care.  Plan per Charge RN is for her to be admitted.  Pt HR down, Nausea better, food order.  PLAN:  Will await further orders from MDN

## 2019-11-10 NOTE — H&P
[facility]    History and Physical  Hospital Medicine       Date of Admission:  11/10/2019  Date of Service: 11/10/2019     Assessment & Plan   Citlalli Villarreal is a 73 year old female NH resident with hx dementia, afib rvr, cva who presents with  N/V/D for at least 2 days and then cp for <24 hours    1. GI- N/V/D for at least 2 days. No vomit or diarrhea here , but some ongoing nausea. Wbc, lft and lipase all normal. Most likely a viral gastroentieritis. Pt does seem to be improving. If has recurrent diarhhea I would order c diff    2. Afib rvr- presented with hr up to 160. Likely vol depleted from GI illness. Was given 600 ml ivf and hr now<100. Dig level 0.4 and in ed did received an iv dose of dig. Apparently in past year had profound bradycardia on combo of dilt and metoprolol so these were stopped. She is on eliquis    3.CP-assoc with some sob that has since resolved. this was in setting of afib rvr. Sx resolving now that hr controlled after getting ivf.  Trop slightly elevated . Likely demand ischemia. Will trend trops.  BNP also up but this has been chronically elevated . CT pulm angio with no PE but some findings on ct suggestive of emphysema and interstitial change and cor calcifications.    4. Pulm- sob resolved with tx of afib rvr.  Does have right basilar rales on exam that I do not previously see documented elsewhere even today. Not clear if this is just from interstitial lung dz or if new chf from ivf. I do not want to diurese as I am concerned this will cause vol depletion and recurrent afib rvr. Since pt is not sob, will just follow for now. If unable to eat will start very low dose ivf    5.Infrarenal AAA, 4 cm. I do not see this previously documented so this may be a new finding. Not clear if pt would want to ever pursue surgery in future if this should grow. Will defer discussion to primary    6.Hyperglycemia- no hx dm. Will follow bs    7.Hx recurrent embolic cva with  hemorrhagic transformation- is maintained on eliquis and asa  , lipitor    8. HTN- on clonidine, lisinopril    9. Depression- on zoloft. On gabapentin and not sure if this is for neurologic or psychiatric reasons    10. GERD- on omeprazole    11. Dementia- unclear if this may seem worse bc of her hx cva        DVT Prophylaxis:  On eliquis for afib  Code Status:  DNR/DNI per NH  Disposition: Anticipate discharge possibly tomorrow. Admit obs    Porsche Lua MD          Past Medical History      Past Medical History:   Diagnosis Date     Atrial fibrillation (H)      CVA (cerebral vascular accident) (H)      Dementia (H)      Gastroesophageal reflux disease      History of embolic stroke with hemorrhagic conversion 10/15/2018. 3/10/2019     Hyperlipemia      Hypertension      Major depressive disorder      Occlusion and stenosis of bilateral carotid arteries      Radial fracture     right     Repeated falls      Right wrist fracture 10/2018     Patient Active Problem List    Diagnosis Date Noted     Leukocytosis 04/15/2019     Priority: Medium     Acute renal injury due to hypovolemia (H) 04/15/2019     Priority: Medium     Rapid atrial fibrillation (H) 04/13/2019     Priority: Medium     A-fib (H) 04/13/2019     Priority: Medium     Cerebrovascular accident (CVA) due to embolism of left anterior cerebral artery (H) 04/05/2019     Priority: Medium     Essential hypertension 04/05/2019     Priority: Medium     CHF (congestive heart failure) (H) 03/22/2019     Priority: Medium     Hypertensive heart disease without heart failure 03/22/2019     Priority: Medium     Chronic atrial fibrillation 03/22/2019     Priority: Medium     Long term current use of anticoagulant therapy 03/22/2019     Priority: Medium     Carotid stenosis, bilateral 03/22/2019     Priority: Medium     Expressive aphasia 03/10/2019     Priority: Medium     Embolic stroke due to atrial fibrillation (H) 03/10/2019     Priority: Medium     History of  embolic stroke with hemorrhagic conversion 10/15/2018. 03/10/2019     Priority: Medium     Chronic constipation 03/09/2019     Priority: Medium     Gastroesophageal reflux disease with esophagitis 03/09/2019     Priority: Medium     Atrial fibrillation with RVR (H) 03/06/2019     Priority: Medium     Cerebral infarction (H)      Priority: Medium     Current moderate episode of major depressive disorder, unspecified whether recurrent (H) 01/02/2019     Priority: Medium     Dementia without behavioral disturbance, unspecified dementia type (H) 01/02/2019     Priority: Medium     Chronic idiopathic constipation 12/13/2018     Priority: Medium     Cognitive impairment 11/21/2018     Priority: Medium     Gastrointestinal hemorrhage, unspecified gastrointestinal hemorrhage type 11/21/2018     Priority: Medium     Altered mental status, unspecified altered mental status type 10/16/2018     Priority: Medium     Carotid stenosis, asymptomatic, bilateral 10/16/2018     Priority: Medium     Deny-neglect of right side 10/16/2018     Priority: Medium     Intracranial atherosclerosis 10/16/2018     Priority: Medium     Overview:   Severe and diffuse       Compression of brain (H) 10/15/2018     Priority: Medium     HLD (hyperlipidemia) 10/15/2018     Priority: Medium     Recurrent falls 10/15/2018     Priority: Medium     Traumatic rhabdomyolysis, initial encounter (H) 10/15/2018     Priority: Medium     Fracture of right distal radius 10/14/2018     Priority: Medium     Overview:   Added automatically from request for surgery [id number]       Intracranial bleed (H) 10/14/2018     Priority: Medium        Past Surgical History     Past Surgical History:   Procedure Laterality Date     FOREARM SURGERY  10/2018    fracture repair        History of Present Illness   Citlalli Villarreal is a 73 year old female NH resident with hx dementia, afib rvr, cva who presents with N/V/D for 1-2 days per NH and then onset of cp with sob.  Pt says she  thinks it was 4-7 days but changes her story. She says she has been intermittently nauseated and then vomitting and having profuse diarhhea. She has been better since she came to ed with only some Nausea and no vomit or diarhhea. She then developed cp central chest in location assoc with sob. She says the sob is gone now and she has only the slightest feeling of cp left. She does say she fell last week on her driveway and has had some mild back pain since but the hx is unreliable as she also thinks she is still living independently. NO cough or runny nose and no urinary sx. She says she walks without a walker    Prior to Admission Medications   Prior to Admission Medications   Prescriptions Last Dose Informant Patient Reported? Taking?   MULTIPLE VITAMINS-MINERALS PO  Nursing Home Yes No   Sig: Take 1 tablet by mouth daily   acetaminophen (TYLENOL) 325 MG tablet  Nursing Home No No   Sig: Take 2 tablets (650 mg) by mouth 3 times daily   apixaban ANTICOAGULANT (ELIQUIS) 5 MG tablet  Nursing Home No No   Sig: Take 1 tablet (5 mg) by mouth 2 times daily   aspirin (ASA) 81 MG tablet  Nursing Home No No   Sig: Take 1 tablet (81 mg) by mouth daily   atorvastatin (LIPITOR) 40 MG tablet  Nursing Home Yes No   Sig: Take 20 mg by mouth every morning    cloNIDine (CATAPRES) 0.2 MG tablet   No No   Sig: Take 1 tablet (0.2 mg) by mouth 2 times daily   digoxin (LANOXIN) 125 MCG tablet   No No   Sig: Take 1 tablet (125 mcg) by mouth daily   gabapentin (NEURONTIN) 100 MG capsule  Nursing Home Yes No   Sig: Take 100 mg by mouth At Bedtime   lisinopril (PRINIVIL/ZESTRIL) 2.5 MG tablet   No No   Sig: Take 1 tablet (2.5 mg) by mouth daily   omeprazole 20 MG tablet  Nursing Home Yes No   Sig: Take 20 mg by mouth daily   polyethylene glycol (MIRALAX/GLYCOLAX) packet  Nursing Home Yes No   Sig: Take 17 g by mouth daily   senna (SENOKOT) 8.6 MG tablet  Nursing Home Yes No   Sig: Take 1 tablet by mouth 2 times daily   sertraline (ZOLOFT) 25  MG tablet  Nursing Home Yes No   Sig: Take 75 mg by mouth daily       Facility-Administered Medications: None     Allergies   No Known Allergies    Family History    Family History   Problem Relation Age of Onset     No Known Problems Mother      No Known Problems Father       Patient yonis she does not know her family hx    Social History   Social History     Socioeconomic History     Marital status: Single     Spouse name: Not on file     Number of children: Not on file     Years of education: Not on file     Highest education level: Not on file   Occupational History     Not on file   Social Needs     Financial resource strain: Not on file     Food insecurity:     Worry: Not on file     Inability: Not on file     Transportation needs:     Medical: Not on file     Non-medical: Not on file   Tobacco Use     Smoking status: Former Smoker     Packs/day: 0.00     Smokeless tobacco: Never Used     Tobacco comment: quit 20 years ago   Substance and Sexual Activity     Alcohol use: No     Frequency: Never     Drug use: No     Sexual activity: Not Currently   Lifestyle     Physical activity:     Days per week: Not on file     Minutes per session: Not on file     Stress: Not on file   Relationships     Social connections:     Talks on phone: Not on file     Gets together: Not on file     Attends Nondenominational service: Not on file     Active member of club or organization: Not on file     Attends meetings of clubs or organizations: Not on file     Relationship status: Not on file     Intimate partner violence:     Fear of current or ex partner: Not on file     Emotionally abused: Not on file     Physically abused: Not on file     Forced sexual activity: Not on file   Other Topics Concern     Parent/sibling w/ CABG, MI or angioplasty before 65F 55M? Not Asked   Social History Narrative    3/6/2019: Spoke with Patient's Son Gildardo, patient has been estranged from her family for the past 15 years until this fall when she was  "hospitalized and found to have a stroke. Per her son, she has had a history of anger issues and outburst and they wonder if she has some sort of underlying mental illness. She now has issues with memory and continues to have angry outburst. It has been recommended by the nurse practitioner at [facility] that Gildardo applies for guardianship, which is has been planning to do but is somewhat hesitant to complete due to his strained relationship with his mother.        11/10/2019- NH patient       Review of Systems    10 point ros neg except for hpi    Physical Exam   BP (!) 134/100   Pulse 74   Temp 98.2  F (36.8  C) (Oral)   Resp 18   Ht 1.702 m (5' 7\")   Wt 57.1 kg (125 lb 14.1 oz)   SpO2 100%   BMI 19.72 kg/m       Weight: 125 lbs 14.12 oz Body mass index is 19.72 kg/m .     Constitutional: Alert, cooperative, no apparent distress, appears nontoxic. Does seem to have word finding difficulties   Eyes: Eyes are clear  HEENT:No evidence of cranial trauma.  Lymph/Hematologic: No lymphadenopathy is appreciated.  Cardiovascular: irreg irreg,  no murmur noted.  2+ dp. No lower extremity edema.  Respiratory:  Rales right base  GI: Soft, mild tender lower abd, normal bowel sounds  Genitourinary: Deferred  Musculoskeletal: Normal muscle bulk and tone.  Skin: Warm and dry, no rashes.   Neurologic: Neck supple. Cranial nerves are grossly intact.  Moves all extrem equally    Data   Data reviewed today:   Recent Labs   Lab 11/10/19  1016   WBC 8.0   HGB 13.8   *         POTASSIUM 4.7   CHLORIDE 104   CO2 27   BUN 19   CR 0.78   ANIONGAP 5   RODOLFO 9.6   *   ALBUMIN 3.9   PROTTOTAL 8.1   BILITOTAL 1.0   ALKPHOS 109   ALT 25   AST 23   LIPASE 60*   TROPI 0.378*     [id number]  Lactate 1.5  Recent Results (from the past 24 hour(s))   CT Chest (PE) Abdomen Pelvis w Contrast    Narrative    CT CHEST PE ABDOMEN PELVIS WITH CONTRAST   11/10/2019 11:08 AM     HISTORY: Chest pain, shortness of air, nausea, " vomiting, abdominal  pain. Demented nursing home patient.    TECHNIQUE: CT chest, abdomen and pelvis with 60 mL isovue IV.  Radiation dose for this scan was reduced using automated exposure  control, adjustment of the mA and/or kV according to patient size, or  iterative reconstruction technique. MIP reformats    COMPARISON: None.    FINDINGS:   Visualized portions of the thyroid gland are unremarkable.    No supraclavicular or axillary lymphadenopathy seen. Several mildly  prominent mediastinal lymph nodes are noted measuring up to 7 mm at  the aorticopulmonary window.    Heart is enlarged with multivessel coronary artery calcifications. No  pericardial effusion is seen. Severe atherosclerotic calcification  seen in the thoracic aorta. No pulmonary embolism seen to the  subsegmental level.    No pleural effusion or pneumothorax is seen. Centrilobular and  paraseptal emphysematous changes seen in the lungs. Subpleural  reticulations are present bilaterally. Mild bronchiectasis also noted  within the lower lobes.    No focal hepatic lesion is seen. No intrahepatic or extrahepatic  biliary duct dilatation present. Gallbladder is unremarkable.    Spleen, adrenal glands and pancreas are unremarkable. No  hydronephrosis seen in the kidneys.    Stomach is underdistended. Small and large bowel loops are nondilated.  Scattered colonic diverticulosis without evidence of diverticulitis.  Distal sigmoid colon is borderline-distended measuring up to 6.0 cm    Severe atherosclerotic vascular calcification seen in the abdominal  aorta. Infrarenal abdominal aortic aneurysm measuring up to 4.0 cm  noted extending to the iliac bifurcation. Calcified and noncalcified  atherosclerotic plaque seen throughout the abdominal aorta. IVC is of  normal course and caliber. No retroperitoneal, mesenteric or pelvic  lymphadenopathy seen.    Urinary bladder is mildly distended. Uterus is surgically absent.    Diffuse osteopenia seen throughout  the bones. Multilevel degenerative  changes seen in the spine.      Impression    IMPRESSION:  1.  No pulmonary embolism seen to the subsegmental level.  2.  Cardiomegaly with multivessel coronary artery calcifications.  3.  Infrarenal abdominal aortic aneurysm measuring up to 4.0 cm and  extending to the iliac bifurcation.  4.  Emphysematous changes throughout the lungs.  5.  Subpleural reticulations with basilar predominant bronchiectasis.  Findings may suggest usual interstitial pneumonia pattern interstitial  lung disease.    TERRENCE STOKES MD       ekg- afib rvr of 146    Porsche Lua MD

## 2019-11-10 NOTE — PROGRESS NOTES
"The following page was sent to Dr. Lua:    \"6468-There are two orders for cont. fluids, do you want it at 50 or 100 ml/hr? Can you put in Compazine for nausea due to Pt not being able to receive Zofran at this time, since last dose was less than 6 hours ago?\"  "

## 2019-11-10 NOTE — ED NOTES
Call from Marcia cobb at [facility] with report. Patient had C/O CP and SOA during night with elevated BP and HR of 150, refused to come in at that point. This AM continued with sx but began vomiting this AM so agreed to come. History of word finding difficulty that causes frustration. Can be aggressive at times, usually throws things when frustrated.

## 2019-11-10 NOTE — PROGRESS NOTES
Patient refused full skin assessment and only allowed RN to look at ankles, feet, heels, face, neck, abdomen, spine, and arms.

## 2019-11-10 NOTE — ED NOTES
Patient has  [place] to Observation  order. Patient has been given Observation brochure  What does Observation mean to me .  Patient has been given the opportunity to ask questions about observation status and their plan of care.  Denise Caro RN

## 2019-11-11 NOTE — PLAN OF CARE
"Has been refusing cares overnight and verbally abusive to staff, telling staff \"f@@@ you\" and gesturing the middle finger to staff. Arelis, ICU chg had spoke to Me[place], MS chg re: rhythm changes noted of A-fib with pauses 1-2 seconds. EKG was completed that appears to be close to the EKG completed yesterday. Sharply responds \"NO\" to chest discomfort or rhythm change sx. Will not offer any other info. Explained to pt need to get a BP and she forcefully held out both arms with a sigh. When asked if she would like to have a heart doctor see her for further interventions, she again, sharply stated, \"NO\". Pt has put on her shoes, glasses and has dentures next to her explaining when the test results come back she is leaving to go home. Will update Dr Lua on Rhythm change and EKG results.   "

## 2019-11-11 NOTE — DISCHARGE SUMMARY
[facility] Discharge Summary    Citlalli Villarreal MRN# [id number]   Age: 73 year old YOB: 1945     Date of Admission:  11/10/2019  Date of Discharge::  11/11/2019  Admitting Physician:  Porsche Lua MD  Discharge Physician:  Jordy Sanchez MD, MD             Admission Diagnoses:   Dehydration [E86.0]  Atrial fibrillation with RVR (H) [I48.91]  Diarrhea, unspecified type [R19.7]  Nausea and vomiting, intractability of vomiting not specified, unspecified vomiting type [R11.2]          Principle Discharge Diagnosis:       Suspected NSTEMI vs strain from atrial fibrillation with rapid ventricular response     See hospital course for further active diagnoses addressed during this admission.            Procedures:   No procedures performed during this admission          Medications Prior to Admission:     Medications Prior to Admission   Medication Sig Dispense Refill Last Dose     acetaminophen (TYLENOL) 325 MG tablet Take 2 tablets (650 mg) by mouth 3 times daily   11/9/2019 at Unknown time     apixaban ANTICOAGULANT (ELIQUIS) 5 MG tablet Take 1 tablet (5 mg) by mouth 2 times daily   11/9/2019 at Unknown time     aspirin (ASA) 81 MG tablet Take 1 tablet (81 mg) by mouth daily  OTC 11/9/2019 at Unknown time     atorvastatin (LIPITOR) 40 MG tablet Take 20 mg by mouth every morning    11/9/2019 at Unknown time     cloNIDine (CATAPRES) 0.2 MG tablet Take 1 tablet (0.2 mg) by mouth 2 times daily   11/9/2019 at Unknown time     digoxin (LANOXIN) 125 MCG tablet Take 1 tablet (125 mcg) by mouth daily   11/9/2019 at Unknown time     gabapentin (NEURONTIN) 100 MG capsule Take 100 mg by mouth At Bedtime   11/9/2019 at Unknown time     lisinopril (PRINIVIL/ZESTRIL) 2.5 MG tablet Take 1 tablet (2.5 mg) by mouth daily   11/9/2019 at Unknown time     MULTIPLE VITAMINS-MINERALS PO Take 1 tablet by mouth daily   11/9/2019 at Unknown time     omeprazole 20 MG tablet Take 20 mg by mouth daily   11/10/2019 at Unknown  time     polyethylene glycol (MIRALAX/GLYCOLAX) packet Take 17 g by mouth daily   11/9/2019 at Unknown time     senna (SENOKOT) 8.6 MG tablet Take 1 tablet by mouth 2 times daily   11/9/2019 at Unknown time     sertraline (ZOLOFT) 25 MG tablet Take 75 mg by mouth daily    11/9/2019 at Unknown time             Discharge Medications:     Current Discharge Medication List      START taking these medications    Details   nitroGLYcerin (NITROSTAT) 0.4 MG sublingual tablet For chest pain place 1 tablet under the tongue every 5 minutes for 3 doses. If symptoms persist 5 minutes after 1st dose call 911.    Associated Diagnoses: NSTEMI (non-ST elevated myocardial infarction) (H)         CONTINUE these medications which have NOT CHANGED    Details   acetaminophen (TYLENOL) 325 MG tablet Take 2 tablets (650 mg) by mouth 3 times daily    Associated Diagnoses: Pain      apixaban ANTICOAGULANT (ELIQUIS) 5 MG tablet Take 1 tablet (5 mg) by mouth 2 times daily    Associated Diagnoses: Atrial fibrillation with RVR (H)      aspirin (ASA) 81 MG tablet Take 1 tablet (81 mg) by mouth daily  Refills: OTC    Associated Diagnoses: Carotid stenosis, asymptomatic, bilateral      atorvastatin (LIPITOR) 40 MG tablet Take 20 mg by mouth every morning       cloNIDine (CATAPRES) 0.2 MG tablet Take 1 tablet (0.2 mg) by mouth 2 times daily    Associated Diagnoses: Essential hypertension      digoxin (LANOXIN) 125 MCG tablet Take 1 tablet (125 mcg) by mouth daily    Associated Diagnoses: Rapid atrial fibrillation (H)      gabapentin (NEURONTIN) 100 MG capsule Take 100 mg by mouth At Bedtime      lisinopril (PRINIVIL/ZESTRIL) 2.5 MG tablet Take 1 tablet (2.5 mg) by mouth daily    Associated Diagnoses: Chronic combined systolic and diastolic congestive heart failure (H); Essential hypertension      MULTIPLE VITAMINS-MINERALS PO Take 1 tablet by mouth daily      omeprazole 20 MG tablet Take 20 mg by mouth daily      polyethylene glycol  "(MIRALAX/GLYCOLAX) packet Take 17 g by mouth daily      senna (SENOKOT) 8.6 MG tablet Take 1 tablet by mouth 2 times daily      sertraline (ZOLOFT) 25 MG tablet Take 75 mg by mouth daily                         Brief History of Illness:     From Admission H+P:   Citlalli Villarreal is a 73 year old female NH resident with hx dementia, afib rvr, cva who presents with N/V/D for 1-2 days per NH and then onset of cp with sob.  Pt says she thinks it was 4-7 days but changes her story. She says she has been intermittently nauseated and then vomitting and having profuse diarhhea. She has been better since she came to ed with only some Nausea and no vomit or diarhhea. She then developed cp central chest in location assoc with sob. She says the sob is gone now and she has only the slightest feeling of cp left. She does say she fell last week on her driveway and has had some mild back pain since but the hx is unreliable as she also thinks she is still living independently. NO cough or runny nose and no urinary sx. She says she walks without a walker            TODAY:     Subjective:  Overnight MD patient had brief episode of rapid ventricular response with heart rate back up to 150's and MD gave her some metoprolol with plan to continue this today, however heart rate then dipped to 40's so metoprolol was stopped. Patient has been refusing telemetry and most cares so no consistent monitoring available from overnight, but this AM heart rate was 60 on vital check and 65-70 at time of my exam.  Patient denies any further chest pain or dyspnea.  She says she feels \"fine\", her only concern is that she doesn't want to be here and doesn't want us to \"do anything to her\".  Discussed that she may have had a heart attack, and she continues to say that she doesn't want anything done for that.  She initially was rather disgruntled with us, giving me double middle fingers when I initially asked how she was doing, but after talking about the " "plan to get her out of the hospital today she calmed down, was able to discuss that she may have had a heart attack and that she didn't want anything done for that, only wants to return home - initially said to her house, but when reminded she was living at [facility] then agreed that she wanted to return there.  Denies any symptoms from overnight.  Says she can't recall what brought her in last night.  No diarrhea or vomiting from overnight.  Taking orals.  No fever or chills.  Later complained that she's \"been here for 4 days\" which is \"far too long\".  Confirmed that she doesn't want any medical cares and wants to let things just \"run their course\" which she appears to understand despite her dementia and occasional agitation, but again after discussing that we planned to discharge her this AM her agitation was markedly improved.    No other concerns.         ROS:   ROS: 10 point ROS neg other than the symptoms noted above in the HPI.     /85   Pulse 74   Temp 98.7  F (37.1  C) (Oral)   Resp 18   Ht 1.702 m (5' 7\")   Wt 57.4 kg (126 lb 8.7 oz)   SpO2 94%   BMI 19.82 kg/m     EXAM:  General: awake and alert, NAD, oriented x 2  Head: normocephalic  Neck: unremarkable, no lymphadenopathy   HEENT: oropharynx pink and moist    Heart: irregular rhythm, normal rate, no murmurs   Lungs: clear to auscultation bilaterally with good air movement throughout  Abdomen: soft, non-tender, no masses or organomegaly  Extremities: no edema in lower extremities   Skin unremarkable.            Hospital Course:     Citlalli Villarreal is a 73 year old female NH resident with hx dementia, afib rvr, cva who presents with  N/V/D for at least 2 days and then cp for <24 hours     Goals of care  Patient has POLST from about 6 months ago stating comfort cares.  She has since been established with a conservator to be her POA, but goals remain comfort per memory care facility and confirmed with POA that this is consistent with her goals " "of care and that the only reason she was sent in yesterday was due to the degree of discomfort from the chest pain at that time, not due to a desire from them or the patient for any intervention other than symptom control.  Patient states that she \"doesn't want anything done\", doesn't want any treatment even for serious/life threatening conditions, and just wants to be \"left alone\" and kept comfortable.     N/V/D for at least 2 days - suspect gastroenteritis, now resolving.   No vomit or diarrhea here , but some ongoing nausea. Wbc, lft and lipase all normal. Most likely a viral gastroentieritis. Pt does seem to be improving. Resumed regular diet prior to discharge. encourage good fluid intake after discharge.       Atrial fibrillation with rapid ventricular response -   11/10/19 -- presented with hr up to 160. Likely vol depleted from GI illness. Was given 600 ml ivf and hr now<100. Dig level 0.4 and in ed did received an iv dose of dig. Apparently in past year had profound bradycardia on combo of dilt and metoprolol so these were stopped. She is on eliquis  11/11/2019 -- heart rate jumped again briefly to 150's and patient was given metoprolol, but heart rate then dropped to 40, so this was stopped - currently on digoxin and this was continued this AM - currently heart rate stable in 60-70 range and patient completley asymptomatic.   Patient refusing tele and demanding discharge as above - given comfort goals with patient saying she doesn't want any new medications and currently stable heart rate on current medications and now asymptomatic will discharge on prior to admission digoxin - if she has recurrence of rapid ventricular response/symptoms of chest pain/palpitations as an outpatient could consider adding amiodarone or increasing digoxin given level being low on admission.       Suspected NSTEMI vs strain from atrial fibrillation with rapid ventricular response   11/10/19 -- On admission had CP-assoc with " some sob that has since resolved. this was in setting of afib rvr. Sx resolving now that hr controlled after getting ivf.  Trop slightly elevated . Likely demand ischemia. ECGs show some mild changes consistent with strain vs ischemia but no ST elevation.  Will trend trops.  BNP also up but this has been chronically elevated . CT pulm angio with no PE but some findings on ct suggestive of emphysema and interstitial change and cor calcifications.  11/11/2019 -- troponin jumped markedly last night to 6, patient asymptomatic this AM and as above does not want any interventions, did not tolerate metoprolol as above.  She is on aspirin and eliquis which we'll continue, does not want any new medications regardless as above.  Overall, this may have been all strain from atrial fibrillation/RVR but acute symptoms with troponin jumping all the way to 6 I think this may have been an acute NSTEMI triggering the rapid ventricular response, however very hard to know. Will discharge with prn nitrogylcerin to use prn if she has recurrence of chest pain.       Dyspnea - suspect due to rapid ventricular response vs NSTEMI as above   11/10/19 --  resolved with tx of afib rvr.  Does have right basilar rales on exam that I do not previously see documented elsewhere even today. Not clear if this is just from interstitial lung dz or if new chf from ivf. I do not want to diurese as I am concerned this will cause vol depletion and recurrent afib rvr. Since pt is not sob, will just follow for now.   11/11/2019 -- remained asymptomatic overnight and on room air.       Infrarenal AAA, 4 cm.   I do not see this previously documented so this may be a new finding. according to goals of comfort cares as above will not plan to follow this.        Hyperglycemia-   - no hx dm. A1c in pre-diabetic range - no change/intervention at present especially given comfort goals.       Hx recurrent embolic cva with hemorrhagic transformation-   is maintained on  eliquis, asa and lipitor - continued      HTN-   on clonidine, lisinopril - blood pressure remained stable, continued.       Depression-   on zoloft. On gabapentin and not sure if this is for neurologic or psychiatric reasons. No changes.     GERD-  Continued on omeprazole     Dementia-   Appears stable, plan for return to memory care on discharge.  See discussion of POA above.             DVT Prophylaxis:  On eliquis for atrial fibrillation    Code Status:  DNR/DNI per NH and POLST            Discharge Instructions and Follow-Up:     Discharge diet: Orders Placed This Encounter      Advance Diet as Tolerated: Clear Liquid Diet       Discharge activity: Activity as tolerated   Discharge follow-up: Follow-up with primary care provider prn, POA planning to update POLST for good measure so there is one signed by them as well as the one signed by her daughter from back on April.               Discharge Disposition:     Discharged back to memory care      Attestation:  I have reviewed today's vital signs, notes, medications, labs and imaging.  Amount of time performed on this discharge summary: 70 minutes.    Jordy Sanchez MD, MD

## 2019-11-11 NOTE — PROGRESS NOTES
[facility]  PRIMARY CARE PROVIDER AND CLINIC:  Jennifer Richards, APRN CNP DNP, [address] / [place] [address]  Chief Complaint   Patient presents with     Hospital F/U   [facility] Record Number:  [id number]  Place of Service where encounter took place:  [facility]  ([facility]) [[id number]] Citlalli Villarreal  is a 73 year old  (1945), admitted to the above facility from  [facility]. Hospital stay 11/10/19 through 11/11/19.  Admitted to this facility for  medical management and nursing care. HPI: HPI information obtained from: facility chart records, facility staff, patient report, [facility] chart review and Care Everywhere [facility] chart review.     Brief Summary of Hospital Course: Patient presented to [facility] on 11/10 w/ N/V/Diarrhea and resulting dehydration. She was also found to be in afib w/ RVR and ended up suffering an NSTEMI. She was treated conservatively and once stable, she returned to .     Updates since return to skilled nursing memory unit: Patient returned home today from [facility] after hospitalization as noted above. Her Trops were elevated as noted below and BNP was also exacerbated. She now denies SOB, CP, palpitations. We note her hx of afob, CVA, and CHF. She overall denies pain. She states her nausea and diarrhea were really bad before, but that she is feeling much better (we note her memory limitations). Nursing reports no new sxs. VS are as listed below w/ most recent labs. We note some soft BPs and risk of clonidine in severe CAD. We also note this patient is ambulatory.   11/11/2019 22:30 116/72 mmHg   11/11/2019 18:59 111/70 mmHg   11/11/2019 18:25 101/62 mmHg   11/11/2019 14:30 111/70 mmHg   11/11/2019 10:30 89/67 mmHg    CODE STATUS/ADVANCE DIRECTIVES DISCUSSION: DNR/DNI.    Patient's living condition: lives in a skilled nursing facility. ALLERGIES: Patient has no known allergies. PAST MEDICAL HISTORY:  has a past medical history of  Atrial fibrillation (H), CVA (cerebral vascular accident) (H), Dementia (H), Gastroesophageal reflux disease, History of embolic stroke with hemorrhagic conversion 10/15/2018. (3/10/2019), Hyperlipemia, Hypertension, Major depressive disorder, Occlusion and stenosis of bilateral carotid arteries, Repeated falls, and Right wrist fracture (10/2018). PAST SURGICAL HISTORY:   has a past surgical history that includes Forearm surgery (Right, 10/2018). FAMILY HISTORY: family history includes No Known Problems in her father and mother. SOCIAL HISTORY:   reports that she has quit smoking. She smoked 0.00 packs per day. She has never used smokeless tobacco. She reports that she does not drink alcohol or use drugs.  Post Discharge Medication Reconciliation Status: discharge medications reconciled and changed, per note/orders (see AVS)  Current Outpatient Medications   Medication Sig Dispense Refill     acetaminophen (TYLENOL) 325 MG tablet Take 2 tablets (650 mg) by mouth 3 times daily       apixaban ANTICOAGULANT (ELIQUIS) 5 MG tablet Take 1 tablet (5 mg) by mouth 2 times daily       aspirin (ASA) 81 MG tablet Take 1 tablet (81 mg) by mouth daily  OTC     atorvastatin (LIPITOR) 40 MG tablet Take 20 mg by mouth every morning        cloNIDine (CATAPRES) 0.2 MG tablet Take 1 tablet (0.2 mg) by mouth 2 times daily (Patient taking differently: Take 0.1 mg by mouth 2 times daily )       digoxin (LANOXIN) 125 MCG tablet Take 1 tablet (125 mcg) by mouth daily       gabapentin (NEURONTIN) 100 MG capsule Take 100 mg by mouth At Bedtime       lisinopril (PRINIVIL/ZESTRIL) 2.5 MG tablet Take 1 tablet (2.5 mg) by mouth daily       MULTIPLE VITAMINS-MINERALS PO Take 1 tablet by mouth daily       nitroGLYcerin (NITROSTAT) 0.4 MG sublingual tablet For chest pain place 1 tablet under the tongue every 5 minutes for 3 doses. If symptoms persist 5 minutes after 1st dose call 911.       omeprazole 20 MG tablet Take 20 mg by mouth three times a  week M/W/F       polyethylene glycol (MIRALAX/GLYCOLAX) packet Take 17 g by mouth daily       senna (SENOKOT) 8.6 MG tablet Take 1 tablet by mouth 2 times daily       sertraline (ZOLOFT) 25 MG tablet Take 75 mg by mouth daily        ROS: Limited secondary to cognitive impairment but today pt reports the above and 4 point ROS including Respiratory, CV, GI and , other than that noted in the HPI, is negative.    Vitals:  BP (!) 153/76   Pulse 109   Temp 98  F (36.7  C)   Resp 22   Wt 56.2 kg (124 lb)   SpO2 97%   BMI 19.42 kg/m    Exam:  GENERAL APPEARANCE: Alert, in no distress, cooperative.   ENT: Mouth/posterior oropharynx intact w/ moist mucous membranes, hearing acuity [place].  EYES: EOM, conjunctivae, lids, pupils and irises normal, PERRL2.   RESP: Respiratory effort good, no respiratory distress, Lung sounds clear. On RA.   CV: Auscultation of heart reveals S1, S2, rate is slightly tachycardic and rhythm is sinus, 2/6 systolic murmur, no rub or gallop, Edema trace+ BLE. Peripheral pulses are 2+.  ABDOMEN: Normal bowel sounds, soft, non-tender abdomen, and no masses palpated.  SKIN: Inspection/Palpation of skin and subcutaneous tissue baseline w/ fragility. No wounds/rashes noted.   NEURO: CN II-XII at patient's baseline, sensation baseline PPS.  PSYCH: Insight, judgement, and memory are impaired at baseline, affect and mood are happy/engaged.    Lab/Diagnostic data:  Most Recent CBC's:  Recent Labs   Lab Test 11/10/19  1016 04/16/19  0417 04/15/19  0415   WBC 8.0 8.4 14.3*   HGB 13.8 12.6 14.3   * 97 99    237 288   Most Recent BMP's:  Recent Labs   Lab Test 11/10/19  1016 04/16/19  0417 04/15/19  0415    136 138   POTASSIUM 4.7 4.1 5.4*   CHLORIDE 104 107 107   CO2 27 25 19*   BUN 19 27 35*   CR 0.78 0.87 1.19*   ANIONGAP 5 4 12   RODOLFO 9.6 8.3* 9.0   * 93 145*   Most Recent Troponin's:  Recent Labs   Lab Test 11/11/19  0620 11/10/19  1800 11/10/19  1016   TROPI 6.027* 6.240*  0.378*   Most Recent BNP's:  Recent Labs   Lab Test 11/10/19  1016 04/14/19  0029 03/06/19  0322   NTBNPI 2,560* 8,013* 2,976*   Most Recent TSH and T4:  Recent Labs   Lab Test 04/15/19  0415   TSH 4.82*   T4 1.45   Most Recent Hemoglobin A1c:  Recent Labs   Lab Test 11/10/19  1800   A1C 6.3*     ASSESSMENT/PLAN:  NSTEMI (non-ST elevated myocardial infarction) (H)  Chronic combined systolic and diastolic congestive heart failure (H)  Chronic atrial fibrillation  Essential hypertension  Acute-on-chronic. Tenuous. Will reduce Clonidine for better BP control. Will decrease Omeprazole as this contributes to polypharmacy and comorbid conditions. Will ask nursing for serial BP checks. Will trend CBC, CMP and BNP given acute cardiac event. Follow-up w/in 1 week or as needed.    Orders written by provider at facility and transcribed by : Bubba Diaz  1. Decrease Clonidine from 0.2 mg BID to 0.1 mg PO BID Dx: HT  2. Decrease omeprazole from 20 mg/day to 20 mg PO every day on M/W/F only. Dx: GERD.  3. CBC, CMP, BNP x1 on fri 11/15 Dx: NSTEMI.  4. Check BP's BID x7 days. Dx: HTN .    Electronically signed by:  Dr. Jennifer Richards, APRN CNP DNP

## 2019-11-11 NOTE — PROGRESS NOTES
At midnight, pt declined lab draw, BP checks and cares. Dr Lua present when  reported refusal and instructed to let her refuse cares.   Pt awake at this time, set off alarm with pt attempting to get out of bed to used the bathroom, again pt appears angry and sharply tells the staff she doesn't need our help, but is attempt to walk into the hallway vs bathroom and has no regard to the IV tubing or safety of IV or herself.   Pt gesturing the middle finger to staff when assisting and continues to report staff is not needed.

## 2019-11-11 NOTE — PROGRESS NOTES
Addendum    Called by nursing staff. Hr has decr to 40's, rhythm still afib. Pt asymptomatic. She has denied cp.  She has also been refusing all cares, meds, labs.  At this point will stop metoprolol.  If hr again increases and pt becomes tachycardic would transfer to icu and start amiodarone. If pt refuses would consider palliative care consult and speak to family.    Porsche Lua MD

## 2019-11-11 NOTE — PROGRESS NOTES
JOSE attempted to do 2300 VS. She was unable due to patient's refusal & threw a pillow at the NA. RN instructed to let patient calm down for a while & attempt to check VS later on.

## 2019-11-11 NOTE — PLAN OF CARE
"Droplet precautions in place. Pleasantly confused. SBA with walker, however Pt does not use walker appropriately, she carries it, therefore not used the rest of the evening, Pt used IV pole. Pt reported sharp CP after ambulating to the bathroom. At the same time the ICU Charge RN called to inform writer that the Pt's HR per Tele monitor was reading 150's-160's and suggested we give Digoxin, Dr. Lua did not want this given and instead ordered nitroglycerin PO and IV Metoprolol. Nitroglycerin PRN administered x 1 for CP, along with IV Metoprolol at approx. 1852. Pt states relief of pain after these medications were administered. IV infusing NS at 50 ml/hr. Two new IV's placed this evening; only one in place at this time. Intermittent reports of nausea. Clear liquid diet tolerated well. Trop ordered q6h. Carb counting ordered. BP (!) 177/112 (BP Location: Right arm)   Pulse 74   Temp 98.7  F (37.1  C) (Oral)   Resp 18   Ht 1.702 m (5' 7\")   Wt 57.1 kg (125 lb 14.1 oz)   SpO2 94%   BMI 19.72 kg/m  . Will continue to monitor.    "

## 2019-11-11 NOTE — PLAN OF CARE
[facility] DISCHARGE NOTE    Patient discharged to long term care facility where she lives at 9:59 AM via wheel chair. Accompanied by other: [employer] . Discharge instructions reviewed with caregiver, Celi nurse at [facility]. opportunity offered to ask questions. Prescriptions - None ordered for discharge. All belongings sent with patient.    Vikki Graf RN

## 2019-11-11 NOTE — PROGRESS NOTES
ICU Charge RN called to inform writer that the Pt's Tele monitor was reading 's-160's and suggested to call pharmacy and ensure her Digoxin is verified ASAP, so that something could be given now. Writer contacting Pharmacy at this time and will MAXIMO TUCKER.     Per Dr. Lua, Hold Digoxin and administer IV Metoprolol and PO nitroglycerin.

## 2019-11-11 NOTE — CONSULTS
Name: Citlalli Villarreal    MRN#: [id number]    Reason for Hospitalization: Dehydration [E86.0]  Atrial fibrillation with RVR (H) [I48.91]  Diarrhea, unspecified type [R19.7]  Nausea and vomiting, intractability of vomiting not specified, unspecified vomiting type [R11.2]    Discharge Date: 11/11/2019    Patient / Family response to discharge plan: Pt lives at [facility] Phone (Main Phone:[phone] Admissions Phone:[phone] Fax: [phone]) LT and will be returning there today via HE transport. Verified pt was on a bed hold.     Pt has legal guardianship paperwork, this was sent to [employer] to be verified.     POLS completed on 4/23/19 by pts dtr, this writer spoke with fiduciary [phone] and spoke with Deann, she verified that they will be completing a new POLST today at [facility] with pt.     Other Providers (Care Coordinator, County Services, PCA services etc): No    CTS Hand Off Completed: Not needed    PAS #: n/a    MALACHI Score: none    Future Appointments:   Future Appointments   Date Time Provider Department Center   11/11/2019 12:30 PM Jennifer Richards, APRN [facility]       Discharge Disposition: long term care facility    Discharge Planner   Discharge Plans in progress: [facility]  Barriers to discharge plan: none  Follow up plan: LTC       Entered by: Carol Montana 11/11/2019 10:03 AM           Carol Montana MSW, LICSW, [facility] [phone]

## 2019-11-11 NOTE — PROGRESS NOTES
RN attempted to call [facility] to review PTA medications. No one available at this time, unit phone number left and informed that someone would reach back out soon.

## 2019-11-11 NOTE — LETTER
11/11/2019        RE: Citlalli Villarreal  [facility]  [address] [facility] [address]        [place] [facility]  PRIMARY CARE PROVIDER AND CLINIC:  JADA Branham CNP DNP, [address] / [place] [address]  Chief Complaint   Patient presents with     Hospital F/U   [place] Medical Record Number:  [id number]  Place of Service where encounter took place:  [facility]  ([facility]) [[id number]] Citlalli Villarreal  is a 73 year old  (1945), admitted to the above facility from  [facility]. Hospital stay 11/10/19 through 11/11/19.  Admitted to this facility for  medical management and nursing care. HPI: HPI information obtained from: facility chart records, facility staff, patient report, [facility] chart review and Care Everywhere [facility] chart review.     Brief Summary of Hospital Course: Patient presented to [facility] on 11/10 w/ N/V/Diarrhea and resulting dehydration. She was also found to be in afib w/ RVR and ended up suffering an NSTEMI. She was treated conservatively and once stable, she returned to .     Updates since return to skilled nursing memory unit: Patient returned home today from [facility] after hospitalization as noted above. Her Trops were elevated as noted below and BNP was also exacerbated. She now denies SOB, CP, palpitations. We note her hx of afob, CVA, and CHF. She overall denies pain. She states her nausea and diarrhea were really bad before, but that she is feeling much better (we note her memory limitations). Nursing reports no new sxs. VS are as listed below w/ most recent labs. We note some soft BPs and risk of clonidine in severe CAD. We also note this patient is ambulatory.   11/11/2019 22:30 116/72 mmHg   11/11/2019 18:59 111/70 mmHg   11/11/2019 18:25 101/62 mmHg   11/11/2019 14:30 111/70 mmHg   11/11/2019 10:30 89/67 mmHg    CODE STATUS/ADVANCE DIRECTIVES DISCUSSION: DNR/DNI.    Patient's living condition: lives in a  skilled nursing facility. ALLERGIES: Patient has no known allergies. PAST MEDICAL HISTORY:  has a past medical history of Atrial fibrillation (H), CVA (cerebral vascular accident) (H), Dementia (H), Gastroesophageal reflux disease, History of embolic stroke with hemorrhagic conversion 10/15/2018. (3/10/2019), Hyperlipemia, Hypertension, Major depressive disorder, Occlusion and stenosis of bilateral carotid arteries, Repeated falls, and Right wrist fracture (10/2018). PAST SURGICAL HISTORY:   has a past surgical history that includes Forearm surgery (Right, 10/2018). FAMILY HISTORY: family history includes No Known Problems in her father and mother. SOCIAL HISTORY:   reports that she has quit smoking. She smoked 0.00 packs per day. She has never used smokeless tobacco. She reports that she does not drink alcohol or use drugs.  Post Discharge Medication Reconciliation Status: discharge medications reconciled and changed, per note/orders (see AVS)  Current Outpatient Medications   Medication Sig Dispense Refill     acetaminophen (TYLENOL) 325 MG tablet Take 2 tablets (650 mg) by mouth 3 times daily       apixaban ANTICOAGULANT (ELIQUIS) 5 MG tablet Take 1 tablet (5 mg) by mouth 2 times daily       aspirin (ASA) 81 MG tablet Take 1 tablet (81 mg) by mouth daily  OTC     atorvastatin (LIPITOR) 40 MG tablet Take 20 mg by mouth every morning        cloNIDine (CATAPRES) 0.2 MG tablet Take 1 tablet (0.2 mg) by mouth 2 times daily (Patient taking differently: Take 0.1 mg by mouth 2 times daily )       digoxin (LANOXIN) 125 MCG tablet Take 1 tablet (125 mcg) by mouth daily       gabapentin (NEURONTIN) 100 MG capsule Take 100 mg by mouth At Bedtime       lisinopril (PRINIVIL/ZESTRIL) 2.5 MG tablet Take 1 tablet (2.5 mg) by mouth daily       MULTIPLE VITAMINS-MINERALS PO Take 1 tablet by mouth daily       nitroGLYcerin (NITROSTAT) 0.4 MG sublingual tablet For chest pain place 1 tablet under the tongue every 5 minutes for 3  doses. If symptoms persist 5 minutes after 1st dose call 911.       omeprazole 20 MG tablet Take 20 mg by mouth three times a week M/W/F       polyethylene glycol (MIRALAX/GLYCOLAX) packet Take 17 g by mouth daily       senna (SENOKOT) 8.6 MG tablet Take 1 tablet by mouth 2 times daily       sertraline (ZOLOFT) 25 MG tablet Take 75 mg by mouth daily        ROS: Limited secondary to cognitive impairment but today pt reports the above and 4 point ROS including Respiratory, CV, GI and , other than that noted in the HPI, is negative.    Vitals:  BP (!) 153/76   Pulse 109   Temp 98  F (36.7  C)   Resp 22   Wt 56.2 kg (124 lb)   SpO2 97%   BMI 19.42 kg/m     Exam:  GENERAL APPEARANCE: Alert, in no distress, cooperative.   ENT: Mouth/posterior oropharynx intact w/ moist mucous membranes, hearing acuity [place].  EYES: EOM, conjunctivae, lids, pupils and irises normal, PERRL2.   RESP: Respiratory effort good, no respiratory distress, Lung sounds clear. On RA.   CV: Auscultation of heart reveals S1, S2, rate is slightly tachycardic and rhythm is sinus, 2/6 systolic murmur, no rub or gallop, Edema trace+ BLE. Peripheral pulses are 2+.  ABDOMEN: Normal bowel sounds, soft, non-tender abdomen, and no masses palpated.  SKIN: Inspection/Palpation of skin and subcutaneous tissue baseline w/ fragility. No wounds/rashes noted.   NEURO: CN II-XII at patient's baseline, sensation baseline PPS.  PSYCH: Insight, judgement, and memory are impaired at baseline, affect and mood are happy/engaged.    Lab/Diagnostic data:  Most Recent CBC's:  Recent Labs   Lab Test 11/10/19  1016 04/16/19  0417 04/15/19  0415   WBC 8.0 8.4 14.3*   HGB 13.8 12.6 14.3   * 97 99    237 288   Most Recent BMP's:  Recent Labs   Lab Test 11/10/19  1016 04/16/19  0417 04/15/19  0415    136 138   POTASSIUM 4.7 4.1 5.4*   CHLORIDE 104 107 107   CO2 27 25 19*   BUN 19 27 35*   CR 0.78 0.87 1.19*   ANIONGAP 5 4 12   RODOLFO 9.6 8.3* 9.0   * 93  145*   Most Recent Troponin's:  Recent Labs   Lab Test 11/11/19  0620 11/10/19  1800 11/10/19  1016   TROPI 6.027* 6.240* 0.378*   Most Recent BNP's:  Recent Labs   Lab Test 11/10/19  1016 04/14/19  0029 03/06/19  0322   NTBNPI 2,560* 8,013* 2,976*   Most Recent TSH and T4:  Recent Labs   Lab Test 04/15/19  0415   TSH 4.82*   T4 1.45   Most Recent Hemoglobin A1c:  Recent Labs   Lab Test 11/10/19  1800   A1C 6.3*     ASSESSMENT/PLAN:  NSTEMI (non-ST elevated myocardial infarction) (H)  Chronic combined systolic and diastolic congestive heart failure (H)  Chronic atrial fibrillation  Essential hypertension  Acute-on-chronic. Tenuous. Will reduce Clonidine for better BP control. Will decrease Omeprazole as this contributes to polypharmacy and comorbid conditions. Will ask nursing for serial BP checks. Will trend CBC, CMP and BNP given acute cardiac event. Follow-up w/in 1 week or as needed.    Orders written by provider at facility and transcribed by : Bubba Diaz  1. Decrease Clonidine from 0.2 mg BID to 0.1 mg PO BID Dx: HT  2. Decrease omeprazole from 20 mg/day to 20 mg PO every day on M/W/F only. Dx: GERD.  3. CBC, CMP, BNP x1 on fri 11/15 Dx: NSTEMI.  4. Check BP's BID x7 days. Dx: HTN .    Electronically signed by:  JADA Spence CNP DNP        Sincerely,        JADA Branham CNP

## 2019-11-11 NOTE — PROGRESS NOTES
Called by nursing with 2 things simultaneously.recurrent rapid afib rates up to 160 with asoc cp. Also second trop back at 6.24.  I spoke to pt about whether or not she would ever want to have coronary angiogram and she does not. So I dicussed with her our goal would be trying to treat her medically.  At this ppoint it is not clear to me if she is having a primary ischemic event or cor ischemia secondary to Afib rvr.. She is already on asa and eliquis.    Plan    1. Stop dig and start metoprolol. Her profound bradycardia in past only occurred with combo of dilt and metoprolol. If I can't control hr with metoprolol will use amiodarone    2. Sl ntg. If pain cont, use either nitropaste or ntg drip    3. Follow trops    Porsche Lua MD

## 2019-11-12 NOTE — PROGRESS NOTES
Nurse called today to report that patient is having some nausea which is recurrent, tachycardia which has been present since her admission, higher BP, and back pain. We note her history of NSTEMI in recent hospitalization. We note that her legally appointed decision maker opted to treat this patient conservatively, and NOT with invasive interventions. We note she is DNR/DNI.    Upcoming medications include ones for BP control and pain. Nursing does not have PRN medications available for nausea. One PRN Nitro has been administered.    Orders:  1. Zofran 4 mg PO Q6H PRN. Dx: nausea.  2. Give regularly scheduled meds.  3. Push fluids.  4. Switch scheduled labs from Friday 11-15 to tomorrow 11-13.  5. Update legal guardian.    Nursing advised to follow standing house orders otherwise, and if patient does not improve or legal guardian wishes for further evaluation, to call back and update provider.    Electronically signed by:  Dr. Jennifer Richards, JADA CNP DNP

## 2019-11-13 NOTE — LETTER
11/13/2019        RE: Citlalli Villarreal  [facility]  [address] [facility] [address]        [place] [facility]  [place] Medical Record Number:  [id number]  Place of Service where encounter took place:  [facility]  ([facility]) [[id number]]  Chief Complaint   Patient presents with     Nursing Home Acute   HPI: Citlalli Villarreal  is a 73 year old (1945), who is being seen today for an episodic care visit.  HPI information obtained from: facility chart records, facility staff, patient report, [facility] chart review and Care Everywhere [facility] chart review. Today's concern is:    NSTEMI (non-ST elevated myocardial infarction) (H)  Chronic combined systolic and diastolic congestive heart failure (H)  Chronic atrial fibrillation  Essential hypertension  Nausea  Dementia with behavioral disturbance, unspecified dementia type (H)  AAA w/o rupture (H)  Received call yesterday regarding patient's ongoing nausea, ordered Zofran and following up today. Patient states she had mid-sternal CP, sometimes it radiates to her back, she is nauseous. She states besides that, she has a great appetite, denies dizziness, SOB, cough, fever, or palpitations. Nursing states she is tired. Chart review shows labs included below, HR variability since readmission and BPs also variable:  11/13/2019 18:39 163/97 mmHg   11/13/2019 09:45 125 / 77 mmHg   11/13/2019 05:44 84/54 mmHg   11/12/2019 19:48 110/72 mmHg   11/12/2019 17:30 153 / 86 mmHg   11/12/2019 17:23 168/92 mmHg   11/12/2019 16:30 178/151 mmHg  11/12/2019 10:38 121 / 75 mmHg   11/12/2019 08:26 153/76 mmHg   11/12/2019 02:30 110 / 72 mmHg    11/11/2019 22:30 116 / 72 mmHg    11/11/2019 18:59 111 / 70 mmHg   11/11/2019 18:25 101 / 62 mmHg   11/11/2019 14:30 111 / 70 mmHg   11/11/2019 10:30 89/67 mmHg     Past Medical and Surgical History reviewed in Epic today.  REVIEW OF SYSTEMS: Limited secondary to cognitive impairment but today pt reports the  above and 4 point ROS including Respiratory, CV, GI and , other than that noted in the HPI, is negative.     Objective:  /77   Pulse 58   Temp 96.2  F (35.7  C)   Resp 16   Wt 58.1 kg (128 lb)   SpO2 99%   BMI 20.05 kg/m     Exam:  GENERAL APPEARANCE: Alert, in no distress, cooperative.   ENT: Mouth/posterior oropharynx intact w/ moist mucous membranes, hearing acuity [place].  EYES: EOM, conjunctivae, lids, pupils and irises normal, PERRL2.   RESP: Respiratory effort good, no respiratory distress, Lung sounds clear. On RA.   CV: Auscultation of heart reveals S1, S2, rate is tachycardic (noting intermittent bradycardia above) and rhythm irregular, 2/6 systolic murmur, no rub or gallop, Edema 0+ BLE. Peripheral pulses are 2+.  ABDOMEN: Normal bowel sounds, soft, non-tender abdomen, and no masses palpated.  SKIN: Inspection/Palpation of skin and subcutaneous tissue baseline w/ fragility. No wounds/rashes noted.   NEURO: CN II-XII at patient's baseline, sensation baseline PPS.  PSYCH: Insight, judgement, and memory are baseline impaired, affect and mood are engaged/irritable.     Labs:       ASSESSMENT/PLAN:  NSTEMI (non-ST elevated myocardial infarction) (H)  Chronic combined systolic and diastolic congestive heart failure (H)  Chronic atrial fibrillation  Essential hypertension  Nausea  Dementia with behavioral disturbance, unspecified dementia type (H)  Acute-on-chronic. Tenuous. There is potential for this patient to have ongoing cardiac events. Provider consulted court-appointed conservator Deann Rodriguez and discussed options (Hospice, cardiology consult, returning to hospital, etc). Nursing to give nitroglycerin x1 now for CP, we will provide medication parameters and BP parameters given AAA, we will consult hospice x1 as goals-of-care discussion w/ conservator shows this is following patient's wishes. Follow-up w/in 1 week or as needed.    Orders written by provider at facility and transcribed by team  coordinator: Bubba Diaz  1. Consult [facility] x1 (contact deann meena) for set up Dx: AAA Dementia  2. Update POLST DNR/DNI per deann  3. Hold Digoxin if HR <60  4. Hold Clonidine if SBP <100 Dx: HTN, Afib  5. Keep SBP <150 Dx: AAA    Total time spent with patient visit was 45 min including patient visit, review of past records and phone call to patient contact. Greater than 50% of total time spent with counseling and coordinating care w/ patient's court-appointed legal conservator and decision-maker Deann via phone from [phone] to [phone] due to patient's change in condition and ACP discussion.     Electronically signed by:  JADA Spence CNP DNP        Sincerely,        JADA Branham CNP

## 2019-11-13 NOTE — PROGRESS NOTES
[place] [facility]  [place] Medical Record Number:  [id number]  Place of Service where encounter took place:  [facility]  ([facility]) [[id number]]  Chief Complaint   Patient presents with     Nursing Home Acute   HPI: Citlalli Villarreal  is a 73 year old (1945), who is being seen today for an episodic care visit.  HPI information obtained from: facility chart records, facility staff, patient report, [facility] chart review and Care Everywhere [facility] chart review. Today's concern is:    NSTEMI (non-ST elevated myocardial infarction) (H)  Chronic combined systolic and diastolic congestive heart failure (H)  Chronic atrial fibrillation  Essential hypertension  Nausea  Dementia with behavioral disturbance, unspecified dementia type (H)  AAA w/o rupture (H)  Received call yesterday regarding patient's ongoing nausea, ordered Zofran and following up today. Patient states she had mid-sternal CP, sometimes it radiates to her back, she is nauseous. She states besides that, she has a great appetite, denies dizziness, SOB, cough, fever, or palpitations. Nursing states she is tired. Chart review shows labs included below, HR variability since readmission and BPs also variable:  11/13/2019 18:39 163/97 mmHg   11/13/2019 09:45 125 / 77 mmHg   11/13/2019 05:44 84/54 mmHg   11/12/2019 19:48 110/72 mmHg   11/12/2019 17:30 153 / 86 mmHg   11/12/2019 17:23 168/92 mmHg   11/12/2019 16:30 178/151 mmHg  11/12/2019 10:38 121 / 75 mmHg   11/12/2019 08:26 153/76 mmHg   11/12/2019 02:30 110 / 72 mmHg    11/11/2019 22:30 116 / 72 mmHg    11/11/2019 18:59 111 / 70 mmHg   11/11/2019 18:25 101 / 62 mmHg   11/11/2019 14:30 111 / 70 mmHg   11/11/2019 10:30 89/67 mmHg     Past Medical and Surgical History reviewed in Epic today.  REVIEW OF SYSTEMS: Limited secondary to cognitive impairment but today pt reports the above and 4 point ROS including Respiratory, CV, GI and , other than that noted in the HPI, is negative.      Objective:  /77   Pulse 58   Temp 96.2  F (35.7  C)   Resp 16   Wt 58.1 kg (128 lb)   SpO2 99%   BMI 20.05 kg/m    Exam:  GENERAL APPEARANCE: Alert, in no distress, cooperative.   ENT: Mouth/posterior oropharynx intact w/ moist mucous membranes, hearing acuity [place].  EYES: EOM, conjunctivae, lids, pupils and irises normal, PERRL2.   RESP: Respiratory effort good, no respiratory distress, Lung sounds clear. On RA.   CV: Auscultation of heart reveals S1, S2, rate is tachycardic (noting intermittent bradycardia above) and rhythm irregular, 2/6 systolic murmur, no rub or gallop, Edema 0+ BLE. Peripheral pulses are 2+.  ABDOMEN: Normal bowel sounds, soft, non-tender abdomen, and no masses palpated.  SKIN: Inspection/Palpation of skin and subcutaneous tissue baseline w/ fragility. No wounds/rashes noted.   NEURO: CN II-XII at patient's baseline, sensation baseline PPS.  PSYCH: Insight, judgement, and memory are baseline impaired, affect and mood are engaged/irritable.     Labs:       ASSESSMENT/PLAN:  NSTEMI (non-ST elevated myocardial infarction) (H)  Chronic combined systolic and diastolic congestive heart failure (H)  Chronic atrial fibrillation  Essential hypertension  Nausea  Dementia with behavioral disturbance, unspecified dementia type (H)  Acute-on-chronic. Tenuous. There is potential for this patient to have ongoing cardiac events. Provider consulted court-appointed conservator Deann Robledo and discussed options (Hospice, cardiology consult, returning to hospital, etc). Nursing to give nitroglycerin x1 now for CP, we will provide medication parameters and BP parameters given AAA, we will consult hospice x1 as goals-of-care discussion w/ conservator shows this is following patient's wishes. Follow-up w/in 1 week or as needed.    Orders written by provider at facility and transcribed by : Bubba Diaz  1. Consult [facility] x1 (contact deann robledo) for set up Dx: AAA  Dementia  2. Update POLST DNR/DNI per marianela  3. Hold Digoxin if HR <60  4. Hold Clonidine if SBP <100 Dx: HTN, Afib  5. Keep SBP <150 Dx: AAA    Total time spent with patient visit was 45 min including patient visit, review of past records and phone call to patient contact. Greater than 50% of total time spent with counseling and coordinating care w/ patient's court-appointed legal conservator and decision-maker Marianela via phone from [phone] to [phone] due to patient's change in condition and ACP discussion.     Electronically signed by:  Dr. Jennifer Richards, APRN CNP DNP

## 2019-11-20 NOTE — PROGRESS NOTES
[place] [facility]  [place] Medical Record Number:  [id number]  Place of Service where encounter took place:  [facility]) [[id number]]  Chief Complaint   Patient presents with     Nursing Home Acute   HPI: Citlalli Villarreal  is a 73 year old (1945), who is being seen today for an episodic care visit.  HPI information obtained from: facility chart records, facility staff, patient report, [facility] chart review and Care Everywhere [facility] chart review. 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 We established BP parameters secondary to AAA. We encouraged use of Zofran and nitroglycerin. We asked for BP trending.     Today, patient states she had mid-sternal CP, sometimes it radiates to her back, she is nauseous. She states besides that, she has a great appetite, denies dizziness, SOB, cough, fever, or palpitations. Nursing states she is tired. Chart review shows labs included below, HR variability since readmission and BPs also variable:  11/19/2019 12:00 110/60 mmHg   11/19/2019 08:32 153/106 mmHg   11/19/2019 05:55 132/87 mmHg   11/18/2019 18:12 147/71 mmHg  11/18/2019 08:48 138/64 mmHg   11/18/2019 05:43 153/94 mmHg   11/17/2019 17:56 178/102 mmHg   11/17/2019 09:06 180/105 mmHg   11/16/2019 18:24 122/76 mmHg   11/16/2019 10:37 123/93 mmHg    11/16/2019 06:17 113/53 mmHg  11/15/2019 18:49 115/77 mmHg   11/15/2019 13:30 111/75 mmHg   11/15/2019 08:47 152/90 mmHg     Past Medical and Surgical History reviewed in Epic today.  REVIEW OF SYSTEMS: Limited secondary to cognitive impairment but today pt reports  the above and 4 point ROS including Respiratory, CV, GI and , other than that noted in the HPI, is negative.     Objective:  BP (!) 131/91   Pulse 60   Temp 97.8  F (36.6  C)   Resp 16   Wt 57.3 kg (126 lb 6.4 oz)   SpO2 98%   BMI 19.80 kg/m    Exam:  GENERAL APPEARANCE: Alert, in no distress, cooperative.   ENT: Mouth/posterior oropharynx intact w/ moist mucous membranes, hearing acuity [place].  EYES: EOM, conjunctivae, lids, pupils and irises normal, PERRL2.   RESP: Respiratory effort good, no respiratory distress, Lung sounds clear. On RA.   CV: Auscultation of heart reveals S1, S2, rate is tachycardic (noting intermittent bradycardia above) and rhythm irregular, 2/6 systolic murmur, no rub or gallop, Edema 0+ BLE. Peripheral pulses are 2+.  ABDOMEN: Normal bowel sounds, soft, non-tender abdomen, and no masses palpated.  SKIN: Inspection/Palpation of skin and subcutaneous tissue baseline w/ fragility. No wounds/rashes noted.   NEURO: CN II-XII at patient's baseline, sensation baseline PPS.  PSYCH: Insight, judgement, and memory are baseline impaired, affect and mood are engaged/irritable.     Labs:   Reviewed today in Epic by provider.    ASSESSMENT/PLAN:  NSTEMI (non-ST elevated myocardial infarction) (H)  Chronic combined systolic and diastolic congestive heart failure (H)  Chronic atrial fibrillation  Essential hypertension  Nausea  Dementia with behavioral disturbance, unspecified dementia type (H)  Hospice care patient  Acute-on-chronic. Tenuous. Will start Imdur as we suspect ongoing angina and note intermittent HTN which requires better control secondary to AAA. Provider updated [facility] w/ this change. Follow-up w/in 1 week or as needed.    Orders written by provider at facility and transcribed by : Bubba Diaz  1. Imdur XR 30 mg PO every day Dx: HTN/Angina    Electronically signed by:  Dr. Jennifer Richards, APRN CNP DNP

## 2019-11-20 NOTE — LETTER
11/20/2019        RE: Citlalli Villarreal  [facility]  [address] [facility] [address]        [place] [facility]  [place] Medical Record Number:  [id number]  Place of Service where encounter took place:  [facility]  ([facility]) [[id number]]  Chief Complaint   Patient presents with     Nursing Home Acute   HPI: Citlalli Villarreal  is a 73 year old (1945), who is being seen today for an episodic care visit.  HPI information obtained from: facility chart records, facility staff, patient report, [facility] chart review and Care Everywhere [facility] chart review. 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 We established BP parameters secondary to AAA. We encouraged use of Zofran and nitroglycerin. We asked for BP trending.     Today, patient states she had mid-sternal CP, sometimes it radiates to her back, she is nauseous. She states besides that, she has a great appetite, denies dizziness, SOB, cough, fever, or palpitations. Nursing states she is tired. Chart review shows labs included below, HR variability since readmission and BPs also variable:  11/19/2019 12:00 110/60 mmHg   11/19/2019 08:32 153/106 mmHg   11/19/2019 05:55 132/87 mmHg   11/18/2019 18:12 147/71 mmHg  11/18/2019 08:48 138/64 mmHg   11/18/2019 05:43 153/94 mmHg   11/17/2019 17:56 178/102 mmHg   11/17/2019 09:06 180/105 mmHg   11/16/2019 18:24 122/76 mmHg   11/16/2019 10:37 123/93 mmHg    11/16/2019 06:17 113/53 mmHg  11/15/2019 18:49 115/77 mmHg   11/15/2019 13:30 111/75 mmHg   11/15/2019 08:47 152/90 mmHg     Past Medical and Surgical  History reviewed in Epic today.  REVIEW OF SYSTEMS: Limited secondary to cognitive impairment but today pt reports the above and 4 point ROS including Respiratory, CV, GI and , other than that noted in the HPI, is negative.     Objective:  BP (!) 131/91   Pulse 60   Temp 97.8  F (36.6  C)   Resp 16   Wt 57.3 kg (126 lb 6.4 oz)   SpO2 98%   BMI 19.80 kg/m     Exam:  GENERAL APPEARANCE: Alert, in no distress, cooperative.   ENT: Mouth/posterior oropharynx intact w/ moist mucous membranes, hearing acuity [place].  EYES: EOM, conjunctivae, lids, pupils and irises normal, PERRL2.   RESP: Respiratory effort good, no respiratory distress, Lung sounds clear. On RA.   CV: Auscultation of heart reveals S1, S2, rate is tachycardic (noting intermittent bradycardia above) and rhythm irregular, 2/6 systolic murmur, no rub or gallop, Edema 0+ BLE. Peripheral pulses are 2+.  ABDOMEN: Normal bowel sounds, soft, non-tender abdomen, and no masses palpated.  SKIN: Inspection/Palpation of skin and subcutaneous tissue baseline w/ fragility. No wounds/rashes noted.   NEURO: CN II-XII at patient's baseline, sensation baseline PPS.  PSYCH: Insight, judgement, and memory are baseline impaired, affect and mood are engaged/irritable.     Labs:   Reviewed today in Epic by provider.    ASSESSMENT/PLAN:  NSTEMI (non-ST elevated myocardial infarction) (H)  Chronic combined systolic and diastolic congestive heart failure (H)  Chronic atrial fibrillation  Essential hypertension  Nausea  Dementia with behavioral disturbance, unspecified dementia type (H)  Hospice care patient  Acute-on-chronic. Tenuous. Will start Imdur as we suspect ongoing angina and note intermittent HTN which requires better control secondary to AAA. Provider updated [facility] w/ this change. Follow-up w/in 1 week or as needed.    Orders written by provider at facility and transcribed by : Bubba Diaz  1. Imdur XR 30 mg PO every day Dx:  HTN/Angina    Electronically signed by:  Dr. Jennifer Richards, APRN CNP DNP      Sincerely,        JADA Branham CNP

## 2019-11-27 NOTE — LETTER
"    11/27/2019        RE: Citlalli Villarreal  [facility]  [address] [facility] [address]        [place] [facility]  [place] Medical Record Number:  [id number]  Place of Service where encounter took place:  [facility]  ([facility]) [[id number]]  Chief Complaint   Patient presents with     Nursing Home Acute   HPI: Citlalli Villarreal  is a 73 year old (1945), who is being seen today for an episodic care visit.  HPI information obtained from: facility chart records, facility staff, patient report, [facility] chart review and Care Everywhere [facility] chart review. Today's concern is:    NSTEMI (non-ST elevated myocardial infarction) (H)  Chronic combined systolic and diastolic congestive heart failure (H)  Chronic atrial fibrillation  Essential hypertension  Nausea  Dementia with behavioral disturbance, unspecified dementia type (H)  AAA w/o rupture (H)  Hospice care patient  Several visits ago, we initiated Imdur for patient s/p NSTEMI w/ ongoing angina in the setting of dementia and hospice. We established BP parameters secondary to AAA. We encouraged use of Zofran and nitroglycerin. Last night, nursing states they gave nitroglycerin x2 and called 911 for patient's CP which did not improve. When EMS arrived, CP had improved and she was not transferred to the hospital. Nursing updated hospice and court-appointed conservator.     Today, patient states she had mid-sternal CP, sometimes it radiates to her back, and she was nauseous at the time, but now all of that is done. She denies SOB, headache, dizziness, palpitation, but does say she feels bad \"sometimes.\" She is tired and wants to skip lunch today. Chart review shows labs included below, HR variability since readmission and BPs also variable:  11/27/2019 08:23 180/120 mmHg   11/26/2019 17:39 158/80 mmHg   11/26/2019 09:17 163/81 mmHg  11/26/2019 08:54 163/91 mmHg   11/25/2019 18:56 96/47 mmHg   11/25/2019 09:46 145/99 mmHg "   11/24/2019 21:06 115/77 mmHg   11/24/2019 18:49 163/91 mmHg   11/24/2019 18:22 162/91 mmHg   1/24/2019 12:46 108/66 mmHg  11/24/2019 09:18 153/120 mmHg   11/23/2019 19:54 142/86 mmHg     Past Medical and Surgical History reviewed in [facility] today.  REVIEW OF SYSTEMS: Limited secondary to cognitive impairment but today pt reports the above and 4 point ROS including Respiratory, CV, GI and , other than that noted in the HPI, is negative.     Objective:  BP (!) 180/120   Pulse 120   Temp 96.7  F (35.9  C)   Resp 16   Wt 56.7 kg (125 lb)   SpO2 96%   BMI 19.58 kg/m     Exam:  GENERAL APPEARANCE: Alert, in no distress, cooperative.   ENT: Mouth/posterior oropharynx intact w/ moist mucous membranes, hearing acuity [place].  EYES: EOM, conjunctivae, lids, pupils and irises normal, PERRL2.   RESP: Respiratory effort good, no respiratory distress, Lung sounds clear. On RA.   CV: Auscultation of heart reveals S1, S2, rate is tachycardic (noting intermittent bradycardia above) and rhythm irregular, 2/6 systolic murmur, no rub or gallop, Edema 0+ BLE. Peripheral pulses are 2+.  ABDOMEN: Normal bowel sounds, soft, non-tender abdomen, and no masses palpated.  SKIN: Inspection/Palpation of skin and subcutaneous tissue baseline w/ fragility. No wounds/rashes noted.   NEURO: CN II-XII at patient's baseline, sensation baseline PPS.  PSYCH: Insight, judgement, and memory are baseline impaired, affect and mood are engaged/irritable.     Labs:   Reviewed today in Epic by provider.    ASSESSMENT/PLAN:  NSTEMI (non-ST elevated myocardial infarction) (H)  Chronic combined systolic and diastolic congestive heart failure (H)  Chronic atrial fibrillation  Essential hypertension  Nausea  Dementia with behavioral disturbance, unspecified dementia type (H)  AAA  Hospice care patient  Acute-on-chronic. Tenuous. Will start Imdur as we suspect ongoing angina and note intermittent HTN which requires better control secondary to AAA. Will increase  Imdur given ongoing Angina, clarify nitroglycerin orders, increase Lisinopril and recheck BMP given these changes. Follow-up w/in 1 week or as needed.    Orders written by provider at facility and transcribed by : Bubba Diaz  1. Increase Lisinopril form 2.5 to 5 mg PO every day  2. increase Imdur form 30 mg to 60 mg PO every day   3. Recheck BMP x1 on 12/4 Dx: HTN/Chest pain  4. Change Nitro orders directions, give 3 doess and if unrelieved call hospice and POA before 911 Dx: chest pain    Electronically signed by:  Dr. Jennifer Richards, JADA CNP DNP      Sincerely,        JADA Branham CNP

## 2019-11-27 NOTE — PROGRESS NOTES
"[place] [facility]  [place] Medical Record Number:  [id number]  Place of Service where encounter took place:  [facility]  ([facility]GS) [[id number]]  Chief Complaint   Patient presents with     Nursing Home Acute   HPI: Citlalli Villarreal  is a 73 year old (1945), who is being seen today for an episodic care visit.  HPI information obtained from: facility chart records, facility staff, patient report, [facility] chart review and Care Everywhere [facility] chart review. Today's concern is:    NSTEMI (non-ST elevated myocardial infarction) (H)  Chronic combined systolic and diastolic congestive heart failure (H)  Chronic atrial fibrillation  Essential hypertension  Nausea  Dementia with behavioral disturbance, unspecified dementia type (H)  AAA w/o rupture (H)  Hospice care patient  Several visits ago, we initiated Imdur for patient s/p NSTEMI w/ ongoing angina in the setting of dementia and hospice. We established BP parameters secondary to AAA. We encouraged use of Zofran and nitroglycerin. Last night, nursing states they gave nitroglycerin x2 and called 911 for patient's CP which did not improve. When EMS arrived, CP had improved and she was not transferred to the hospital. Nursing updated hospice and court-appointed conservator.     Today, patient states she had mid-sternal CP, sometimes it radiates to her back, and she was nauseous at the time, but now all of that is done. She denies SOB, headache, dizziness, palpitation, but does say she feels bad \"sometimes.\" She is tired and wants to skip lunch today. Chart review shows labs included below, HR variability since readmission and BPs also variable:  11/27/2019 08:23 180/120 mmHg   11/26/2019 17:39 158/80 mmHg   11/26/2019 09:17 163/81 mmHg  11/26/2019 08:54 163/91 mmHg   11/25/2019 18:56 96/47 mmHg   11/25/2019 09:46 145/99 mmHg   11/24/2019 21:06 115/77 mmHg   11/24/2019 18:49 163/91 mmHg   11/24/2019 18:22 162/91 mmHg   1/24/2019 12:46 108/66 " mmHg  11/24/2019 09:18 153/120 mmHg   11/23/2019 19:54 142/86 mmHg     Past Medical and Surgical History reviewed in [facility] today.  REVIEW OF SYSTEMS: Limited secondary to cognitive impairment but today pt reports the above and 4 point ROS including Respiratory, CV, GI and , other than that noted in the HPI, is negative.     Objective:  BP (!) 180/120   Pulse 120   Temp 96.7  F (35.9  C)   Resp 16   Wt 56.7 kg (125 lb)   SpO2 96%   BMI 19.58 kg/m    Exam:  GENERAL APPEARANCE: Alert, in no distress, cooperative.   ENT: Mouth/posterior oropharynx intact w/ moist mucous membranes, hearing acuity [place].  EYES: EOM, conjunctivae, lids, pupils and irises normal, PERRL2.   RESP: Respiratory effort good, no respiratory distress, Lung sounds clear. On RA.   CV: Auscultation of heart reveals S1, S2, rate is tachycardic (noting intermittent bradycardia above) and rhythm irregular, 2/6 systolic murmur, no rub or gallop, Edema 0+ BLE. Peripheral pulses are 2+.  ABDOMEN: Normal bowel sounds, soft, non-tender abdomen, and no masses palpated.  SKIN: Inspection/Palpation of skin and subcutaneous tissue baseline w/ fragility. No wounds/rashes noted.   NEURO: CN II-XII at patient's baseline, sensation baseline PPS.  PSYCH: Insight, judgement, and memory are baseline impaired, affect and mood are engaged/irritable.     Labs:   Reviewed today in Epic by provider.    ASSESSMENT/PLAN:  NSTEMI (non-ST elevated myocardial infarction) (H)  Chronic combined systolic and diastolic congestive heart failure (H)  Chronic atrial fibrillation  Essential hypertension  Nausea  Dementia with behavioral disturbance, unspecified dementia type (H)  AAA  Hospice care patient  Acute-on-chronic. Tenuous. Will start Imdur as we suspect ongoing angina and note intermittent HTN which requires better control secondary to AAA. Will increase Imdur given ongoing Angina, clarify nitroglycerin orders, increase Lisinopril and recheck BMP given these changes.  Follow-up w/in 1 week or as needed.    Orders written by provider at facility and transcribed by : Bubba Diaz  1. Increase Lisinopril form 2.5 to 5 mg PO every day  2. increase Imdur form 30 mg to 60 mg PO every day   3. Recheck BMP x1 on 12/4 Dx: HTN/Chest pain  4. Change Nitro orders directions, give 3 doess and if unrelieved call hospice and POA before 911 Dx: chest pain    Electronically signed by:  Dr. Jennifer Richards, APRN CNP DNP

## 2019-12-04 NOTE — LETTER
"    12/4/2019        RE: Citlalli Villarreal  [facility]  [address] [facility] [address]        [place] [facility]  Chief Complaint   Patient presents with     custodial Regulatory   [place] Medical Record Number: [id number]. Place of Service where encounter took place:  [facility]  ([facility]) [[id number]]. HPI: Citlalli Villarreal  is 73 year old (1945), who is being seen today for a federally mandated E/M visit.  HPI information obtained from: facility chart records, facility staff, patient report, [facility] chart review and Care Everywhere [facility] chart review. Today's concerns are:    NSTEMI (non-ST elevated myocardial infarction) (H)  Chronic combined systolic and diastolic congestive heart failure (H)  Chronic atrial fibrillation  Essential hypertension  Dementia with behavioral disturbance, unspecified dementia type (H)  Hospice care patient  Abdominal aortic aneurysm (AAA) without rupture (H)  Frequent Falls  Gross Hematuria  Nursing and hospice services requesting visit today as patient has fallen x3 in the last 72 hours and they report hematuria. No injuries occurred. During our last visit, we noted ongoing angina, which involved EMS at one point, and Imdur was increased from 30mg to 60mg. We also increased Lisinopril for better BP control given AAA. Today, patient states her CP is gone, she denies nausea/heartburn, denies diarrhea. She denies dizziness, headache. She states her bottom hurts a little, but this discomfort is relieved by repositioning. She states her appetite is \"not great\" and she is only eating because she is supposed to. Chart review shows labile BPs/HRs as noted below. We contacted hospice and court-appointed guardian/conservator for discussion on POC, risk of frequent falls while on anticoagulation and w/ AAA, and discussion on Hematuria work-up.  BPs:  12/4/2019 23:49 117/79 mmHg   12/4/2019 18:46 98/68 mmHg   12/4/2019 10:15 120/80 mmHg  "   12/4/2019 09:55 130/100 mmHg  12/4/2019 06:15 127/93 mmHg   12/4/2019 05:15 115/87 mmHg  12/4/2019 04:15 100/66 mmHg   12/4/2019 03:45 118/70 mmHg   12/4/2019 03:15 115/72 mmHg   12/4/2019 03:00 120/70 mmHg   12/4/2019 02:45 133/76 mmHg    12/4/2019 02:30 140/78 mmHg <--Fall.  12/3/2019 21:41 124/92 mmHg <--Fall.   12/3/2019 20:45 102/80 mmHg   12/3/2019 19:45 98/74 mmHg  12/3/2019 19:22 116/98 mmHg   12/3/2019 19:18 110/86 mmHg   12/3/2019 18:45 122/105 mmHg  12/3/2019 18:15 130/110 mmHg    12/3/2019 16:30 122/95 mmHg  12/3/2019 11:00 72/51 mmHg <--BP meds held  12/3/2019 07:00 71/52 mmHg <--BP meds held.  12/3/2019 05:00 118/82 mmHg  12/3/2019 03:04 121/90 mmHg  12/3/2019 02:00 112/72 mmHg  12/3/2019 01:02 109/85 mmHg   12/3/2019 00:30 123/75 mmHg  12/3/2019 00:00 116/81 mmHg  12/2/2019 23:45 113/82 mmHg   12/2/2019 23:30 108/83 mmHg   12/2/2019 23:15 111/85 mmHg   12/2/2019 21:05 111/87 mmHg   12/2/2019 10:14 114/88 mmHg   12/1/2019 17:18 144/75 mmHg   12/1/2019 09:06 145/86 mmHg   11/30/2019 17:18 150/78 mmHg   11/30/2019 09:49 144/109 mmHg  HRs:  12/4/2019 23:49 53 bpm   12/4/2019 18:46 96 bpm   12/4/2019 10:15 104 bpm   12/4/2019 09:55 91 bpm   12/4/2019 08:15 99 bpm  12/4/2019 06:15 98 bpm    ALLERGIES:Patient has no known allergies. PAST MEDICAL HISTORY:   has a past medical history of Atrial fibrillation (H), CVA (cerebral vascular accident) (H), Dementia (H), Gastroesophageal reflux disease, History of embolic stroke with hemorrhagic conversion 10/15/2018. (3/10/2019), Hyperlipemia, Hypertension, Major depressive disorder, Occlusion and stenosis of bilateral carotid arteries, Repeated falls, and Right wrist fracture (10/2018). PAST SURGICAL HISTORY:   has a past surgical history that includes Forearm surgery (Right, 10/2018). FAMILY HISTORY: family history includes No Known Problems in her father and mother. SOCIAL HISTORY:  reports that she has quit smoking. She smoked 0.00 packs per day. She has never  used smokeless tobacco. She reports that she does not drink alcohol or use drugs.  MEDICATIONS:  Medication Sig     acetaminophen 325 MG tablet Take 2 tablets (650 mg) by mouth 3 times daily     aspirin (ASA) 81 MG tablet Take 1 tablet (81 mg) by mouth daily     carvedilol (COREG) 3.125 MG tablet Take 1 tablet (3.125 mg) by mouth BID (with meals)     cloNIDine (CATAPRES) 0.2 MG tablet Take 0.5 tablets (0.1 mg) by mouth daily     digoxin (LANOXIN) 125 MCG tablet Take 1 tablet (125 mcg) by mouth every other day     gabapentin 100 MG capsule Take 100 mg by mouth At Bedtime     IMDUR 60 MG 24 hr tablet Take 1 tablet (60 mg) by mouth daily     lisinopril  5 MG tablet Take 1 tablet (5 mg) by mouth daily     morphine 5 MG solu-tab Take 2.5 mg by mouth every 2 hours as needed for shortness of breath / dyspnea or moderate to severe pain     MULTIPLE VITAMINS-MINERALS PO Take 1 tablet by mouth daily     nitroGLYcerin (NITROSTAT) 0.4 MG sublingual tablet For chest pain place 1 tablet under the tongue every 5 minutes for 3 doses. If symptoms persist 5 minutes after 1st dose call 911.     omeprazole 20 MG tablet Take 20 mg by mouth three times a week M/W/F     ondansetron (ZOFRAN) 4 MG tablet Take 4 mg by mouth Q6h PRN for nausea     polyethylene glycol packet Take 17 g by mouth daily     senna (SENOKOT) 8.6 MG tablet Take 1 tablet by mouth 2 times daily     sertraline (ZOLOFT) 25 MG tablet Take 75 mg by mouth daily      Case Management:  I have reviewed the care plan and MDS and do agree with the plan. Patient's desire to return to the community is not assessible due to cognitive impairment. Information reviewed: Medications, vital signs, orders, and nursing notes.    ROS: Limited secondary to cognitive impairment but today pt reports the above and 4 point ROS including Respiratory, CV, GI and , other than that noted in the HPI, is negative.    Vitals:  BP (!) 127/93   Pulse 98   Temp 97.7  F (36.5  C)   Resp 18   Ht  "1.702 m (5' 7\")   Wt 56.7 kg (125 lb)   SpO2 99%   BMI 19.58 kg/m     Body mass index is 19.58 kg/m .  Exam:  GENERAL APPEARANCE: Alert, in no distress, cooperative.   ENT: Mouth/posterior oropharynx intact w/ moist mucous membranes, hearing acuity [place].  EYES: EOM, conjunctivae, lids, pupils and irises normal, PERRL2.   RESP: Respiratory effort good, no respiratory distress, Lung sounds clear. On RA.   CV: Auscultation of heart reveals S1, S2, rate-controlled and rhythm irregular, 2/6 systolic murmur, no rub or gallop, Edema 0+ BLE. Peripheral pulses are 2+.  ABDOMEN: Normal bowel sounds, soft, non-tender abdomen, and no masses palpated.  SKIN: Inspection/Palpation of skin and subcutaneous tissue baseline w/ fragility. No wounds/rashes noted.   NEURO: CN II-XII at patient's baseline, sensation baseline PPS.  PSYCH: Insight, judgement, and memory are baseline, affect and mood are flat/engaged.    Lab/Diagnostic data:       ASSESSMENT/PLAN  NSTEMI (non-ST elevated myocardial infarction) (H)  Chronic combined systolic and diastolic congestive heart failure (H)  Chronic atrial fibrillation  Essential hypertension  Dementia with behavioral disturbance, unspecified dementia type (H)  Hospice care patient  Abdominal aortic aneurysm (AAA) without rupture (H)  Frequent Falls  Gross Hematuria  Acute-on-chronic. Tenuous. Risks outweight benefits at this point for continuation of anticoagulation, and per ASHA Deann we will discontinue Eliquis (10 minutes via phone). Deann stated understanding that this will increase her risk of stroke or VTE. We will assess Hematuria/falls w/ CBC and UA/UC. We not the high risk associated w/ Digoxin (BEERs), and will therefore start GDR and control rate w/ Coreg. Given BP effects of Coreg, will continue GDR of Clonidine. Hospice was updated. Follow-up w/in 1 week or as needed.    Total time with patient visit: 30 minutes. Greater than 50% of total time spent with counseling and coordinating " care included discussions about the POC and care coordination with the patient, first contact Deann, unit nursing staff (Andreea and Eriberto) and hospice nurse Arianna.     Orders written by provider at facility and transcribed by : Bubba Diaz  1. CBC x1 on 12/6 Dx: Fall  2. UA/UC x1 on 12/14 via clean catch into sterile cup. Dx: Hematuria.   3. Coreg 3.125 mg PO BID Dx: afib.   4. Decrease clonidine form 0.1 mg BID to 0.1 mg PO daily at 0800. Dx: HTN.   5. Change digoxin dosing from daily to every other day Dx: Afib.   6. Discontinue Eliquis.  FYI: Next visit, consider discontinuation of Clonidine and Digoxin. Repeat labs if needed, and follow-up on urine. May consider discontinuation of PPI at next visit.     Electronically signed by:  JADA Spence CNP [facility]        Sincerely,        JADA Branham CNP

## 2019-12-04 NOTE — PROGRESS NOTES
"[place] [facility]  Chief Complaint   Patient presents with     halfway Regulatory   [place] Medical Record Number: [id number]. Place of Service where encounter took place:  [facility]  ([facility]) [[id number]]. HPI: Citlalli Villarreal  is 73 year old (1945), who is being seen today for a federally mandated E/M visit.  HPI information obtained from: facility chart records, facility staff, patient report, [facility] chart review and Care Everywhere Psychiatric chart review. Today's concerns are:    NSTEMI (non-ST elevated myocardial infarction) (H)  Chronic combined systolic and diastolic congestive heart failure (H)  Chronic atrial fibrillation  Essential hypertension  Dementia with behavioral disturbance, unspecified dementia type (H)  Hospice care patient  Abdominal aortic aneurysm (AAA) without rupture (H)  Frequent Falls  Gross Hematuria  Nursing and hospice services requesting visit today as patient has fallen x3 in the last 72 hours and they report hematuria. No injuries occurred. During our last visit, we noted ongoing angina, which involved EMS at one point, and Imdur was increased from 30mg to 60mg. We also increased Lisinopril for better BP control given AAA. Today, patient states her CP is gone, she denies nausea/heartburn, denies diarrhea. She denies dizziness, headache. She states her bottom hurts a little, but this discomfort is relieved by repositioning. She states her appetite is \"not great\" and she is only eating because she is supposed to. Chart review shows labile BPs/HRs as noted below. We contacted hospice and court-appointed guardian/conservator for discussion on POC, risk of frequent falls while on anticoagulation and w/ AAA, and discussion on Hematuria work-up.  BPs:  12/4/2019 23:49 117/79 mmHg   12/4/2019 18:46 98/68 mmHg   12/4/2019 10:15 120/80 mmHg    12/4/2019 09:55 130/100 mmHg  12/4/2019 06:15 127/93 mmHg   12/4/2019 05:15 115/87 mmHg  12/4/2019 04:15 100/66 mmHg "   12/4/2019 03:45 118/70 mmHg   12/4/2019 03:15 115/72 mmHg   12/4/2019 03:00 120/70 mmHg   12/4/2019 02:45 133/76 mmHg    12/4/2019 02:30 140/78 mmHg <--Fall.  12/3/2019 21:41 124/92 mmHg <--Fall.   12/3/2019 20:45 102/80 mmHg   12/3/2019 19:45 98/74 mmHg  12/3/2019 19:22 116/98 mmHg   12/3/2019 19:18 110/86 mmHg   12/3/2019 18:45 122/105 mmHg  12/3/2019 18:15 130/110 mmHg    12/3/2019 16:30 122/95 mmHg  12/3/2019 11:00 72/51 mmHg <--BP meds held  12/3/2019 07:00 71/52 mmHg <--BP meds held.  12/3/2019 05:00 118/82 mmHg  12/3/2019 03:04 121/90 mmHg  12/3/2019 02:00 112/72 mmHg  12/3/2019 01:02 109/85 mmHg   12/3/2019 00:30 123/75 mmHg  12/3/2019 00:00 116/81 mmHg  12/2/2019 23:45 113/82 mmHg   12/2/2019 23:30 108/83 mmHg   12/2/2019 23:15 111/85 mmHg   12/2/2019 21:05 111/87 mmHg   12/2/2019 10:14 114/88 mmHg   12/1/2019 17:18 144/75 mmHg   12/1/2019 09:06 145/86 mmHg   11/30/2019 17:18 150/78 mmHg   11/30/2019 09:49 144/109 mmHg  HRs:  12/4/2019 23:49 53 bpm   12/4/2019 18:46 96 bpm   12/4/2019 10:15 104 bpm   12/4/2019 09:55 91 bpm   12/4/2019 08:15 99 bpm  12/4/2019 06:15 98 bpm    ALLERGIES:Patient has no known allergies. PAST MEDICAL HISTORY:   has a past medical history of Atrial fibrillation (H), CVA (cerebral vascular accident) (H), Dementia (H), Gastroesophageal reflux disease, History of embolic stroke with hemorrhagic conversion 10/15/2018. (3/10/2019), Hyperlipemia, Hypertension, Major depressive disorder, Occlusion and stenosis of bilateral carotid arteries, Repeated falls, and Right wrist fracture (10/2018). PAST SURGICAL HISTORY:   has a past surgical history that includes Forearm surgery (Right, 10/2018). FAMILY HISTORY: family history includes No Known Problems in her father and mother. SOCIAL HISTORY:  reports that she has quit smoking. She smoked 0.00 packs per day. She has never used smokeless tobacco. She reports that she does not drink alcohol or use drugs.  MEDICATIONS:  Medication Sig      "acetaminophen 325 MG tablet Take 2 tablets (650 mg) by mouth 3 times daily     aspirin (ASA) 81 MG tablet Take 1 tablet (81 mg) by mouth daily     carvedilol (COREG) 3.125 MG tablet Take 1 tablet (3.125 mg) by mouth BID (with meals)     cloNIDine (CATAPRES) 0.2 MG tablet Take 0.5 tablets (0.1 mg) by mouth daily     digoxin (LANOXIN) 125 MCG tablet Take 1 tablet (125 mcg) by mouth every other day     gabapentin 100 MG capsule Take 100 mg by mouth At Bedtime     IMDUR 60 MG 24 hr tablet Take 1 tablet (60 mg) by mouth daily     lisinopril  5 MG tablet Take 1 tablet (5 mg) by mouth daily     morphine 5 MG solu-tab Take 2.5 mg by mouth every 2 hours as needed for shortness of breath / dyspnea or moderate to severe pain     MULTIPLE VITAMINS-MINERALS PO Take 1 tablet by mouth daily     nitroGLYcerin (NITROSTAT) 0.4 MG sublingual tablet For chest pain place 1 tablet under the tongue every 5 minutes for 3 doses. If symptoms persist 5 minutes after 1st dose call 911.     omeprazole 20 MG tablet Take 20 mg by mouth three times a week M/W/F     ondansetron (ZOFRAN) 4 MG tablet Take 4 mg by mouth Q6h PRN for nausea     polyethylene glycol packet Take 17 g by mouth daily     senna (SENOKOT) 8.6 MG tablet Take 1 tablet by mouth 2 times daily     sertraline (ZOLOFT) 25 MG tablet Take 75 mg by mouth daily      Case Management:  I have reviewed the care plan and MDS and do agree with the plan. Patient's desire to return to the community is not assessible due to cognitive impairment. Information reviewed: Medications, vital signs, orders, and nursing notes.    ROS: Limited secondary to cognitive impairment but today pt reports the above and 4 point ROS including Respiratory, CV, GI and , other than that noted in the HPI, is negative.    Vitals:  BP (!) 127/93   Pulse 98   Temp 97.7  F (36.5  C)   Resp 18   Ht 1.702 m (5' 7\")   Wt 56.7 kg (125 lb)   SpO2 99%   BMI 19.58 kg/m    Body mass index is 19.58 kg/m .  Exam:  GENERAL " APPEARANCE: Alert, in no distress, cooperative.   ENT: Mouth/posterior oropharynx intact w/ moist mucous membranes, hearing acuity [place].  EYES: EOM, conjunctivae, lids, pupils and irises normal, PERRL2.   RESP: Respiratory effort good, no respiratory distress, Lung sounds clear. On RA.   CV: Auscultation of heart reveals S1, S2, rate-controlled and rhythm irregular, 2/6 systolic murmur, no rub or gallop, Edema 0+ BLE. Peripheral pulses are 2+.  ABDOMEN: Normal bowel sounds, soft, non-tender abdomen, and no masses palpated.  SKIN: Inspection/Palpation of skin and subcutaneous tissue baseline w/ fragility. No wounds/rashes noted.   NEURO: CN II-XII at patient's baseline, sensation baseline PPS.  PSYCH: Insight, judgement, and memory are baseline, affect and mood are flat/engaged.    Lab/Diagnostic data:       ASSESSMENT/PLAN  NSTEMI (non-ST elevated myocardial infarction) (H)  Chronic combined systolic and diastolic congestive heart failure (H)  Chronic atrial fibrillation  Essential hypertension  Dementia with behavioral disturbance, unspecified dementia type (H)  Hospice care patient  Abdominal aortic aneurysm (AAA) without rupture (H)  Frequent Falls  Gross Hematuria  Acute-on-chronic. Tenuous. Risks outweight benefits at this point for continuation of anticoagulation, and per ASHA Zacarias we will discontinue Eliquis (10 minutes via phone). Deann stated understanding that this will increase her risk of stroke or VTE. We will assess Hematuria/falls w/ CBC and UA/UC. We not the high risk associated w/ Digoxin (BEERs), and will therefore start GDR and control rate w/ Coreg. Given BP effects of Coreg, will continue GDR of Clonidine. Hospice was updated. Follow-up w/in 1 week or as needed.    Total time with patient visit: 30 minutes. Greater than 50% of total time spent with counseling and coordinating care included discussions about the POC and care coordination with the patient, first contact Deann, unit nursing staff  (Concepcion) and hospice nurse Arianna.     Orders written by provider at facility and transcribed by : Bubba Diaz  1. CBC x1 on 12/6 Dx: Fall  2. UA/UC x1 on 12/14 via clean catch into sterile cup. Dx: Hematuria.   3. Coreg 3.125 mg PO BID Dx: afib.   4. Decrease clonidine form 0.1 mg BID to 0.1 mg PO daily at 0800. Dx: HTN.   5. Change digoxin dosing from daily to every other day Dx: Afib.   6. Discontinue Eliquis.  FYI: Next visit, consider discontinuation of Clonidine and Digoxin. Repeat labs if needed, and follow-up on urine. May consider discontinuation of PPI at next visit.     Electronically signed by:  JADA Spence CNP DNP

## 2019-12-04 NOTE — LETTER
"    12/4/2019        RE: Citlalli Villarreal  [facility]  [address] [facility] [address]        [place] [facility]  Chief Complaint   Patient presents with     assisted Regulatory   [place] Medical Record Number: [id number]. Place of Service where encounter took place:  [facility]  ([facility]) [[id number]]. HPI: Citlalli Villarreal  is 73 year old (1945), who is being seen today for a federally mandated E/M visit.  HPI information obtained from: facility chart records, facility staff, patient report, [facility] chart review and Care Everywhere [facility] chart review. Today's concerns are:    NSTEMI (non-ST elevated myocardial infarction) (H)  Chronic combined systolic and diastolic congestive heart failure (H)  Chronic atrial fibrillation  Essential hypertension  Dementia with behavioral disturbance, unspecified dementia type (H)  Hospice care patient  Abdominal aortic aneurysm (AAA) without rupture (H)  Frequent Falls  Gross Hematuria  Nursing and hospice services requesting visit today as patient has fallen x3 in the last 72 hours and they report hematuria. No injuries occurred. During our last visit, we noted ongoing angina, which involved EMS at one point, and Imdur was increased from 30mg to 60mg. We also increased Lisinopril for better BP control given AAA. Today, patient states her CP is gone, she denies nausea/heartburn, denies diarrhea. She denies dizziness, headache. She states her bottom hurts a little, but this discomfort is relieved by repositioning. She states her appetite is \"not great\" and she is only eating because she is supposed to. Chart review shows labile BPs/HRs as noted below. We contacted hospice and court-appointed guardian/conservator for discussion on POC, risk of frequent falls while on anticoagulation and w/ AAA, and discussion on Hematuria work-up.  BPs:  12/4/2019 23:49 117/79 mmHg   12/4/2019 18:46 98/68 mmHg   12/4/2019 10:15 120/80 mmHg  "   12/4/2019 09:55 130/100 mmHg  12/4/2019 06:15 127/93 mmHg   12/4/2019 05:15 115/87 mmHg  12/4/2019 04:15 100/66 mmHg   12/4/2019 03:45 118/70 mmHg   12/4/2019 03:15 115/72 mmHg   12/4/2019 03:00 120/70 mmHg   12/4/2019 02:45 133/76 mmHg    12/4/2019 02:30 140/78 mmHg <--Fall.  12/3/2019 21:41 124/92 mmHg <--Fall.   12/3/2019 20:45 102/80 mmHg   12/3/2019 19:45 98/74 mmHg  12/3/2019 19:22 116/98 mmHg   12/3/2019 19:18 110/86 mmHg   12/3/2019 18:45 122/105 mmHg  12/3/2019 18:15 130/110 mmHg    12/3/2019 16:30 122/95 mmHg  12/3/2019 11:00 72/51 mmHg <--BP meds held  12/3/2019 07:00 71/52 mmHg <--BP meds held.  12/3/2019 05:00 118/82 mmHg  12/3/2019 03:04 121/90 mmHg  12/3/2019 02:00 112/72 mmHg  12/3/2019 01:02 109/85 mmHg   12/3/2019 00:30 123/75 mmHg  12/3/2019 00:00 116/81 mmHg  12/2/2019 23:45 113/82 mmHg   12/2/2019 23:30 108/83 mmHg   12/2/2019 23:15 111/85 mmHg   12/2/2019 21:05 111/87 mmHg   12/2/2019 10:14 114/88 mmHg   12/1/2019 17:18 144/75 mmHg   12/1/2019 09:06 145/86 mmHg   11/30/2019 17:18 150/78 mmHg   11/30/2019 09:49 144/109 mmHg  HRs:  12/4/2019 23:49 53 bpm   12/4/2019 18:46 96 bpm   12/4/2019 10:15 104 bpm   12/4/2019 09:55 91 bpm   12/4/2019 08:15 99 bpm  12/4/2019 06:15 98 bpm    ALLERGIES:Patient has no known allergies. PAST MEDICAL HISTORY:   has a past medical history of Atrial fibrillation (H), CVA (cerebral vascular accident) (H), Dementia (H), Gastroesophageal reflux disease, History of embolic stroke with hemorrhagic conversion 10/15/2018. (3/10/2019), Hyperlipemia, Hypertension, Major depressive disorder, Occlusion and stenosis of bilateral carotid arteries, Repeated falls, and Right wrist fracture (10/2018). PAST SURGICAL HISTORY:   has a past surgical history that includes Forearm surgery (Right, 10/2018). FAMILY HISTORY: family history includes No Known Problems in her father and mother. SOCIAL HISTORY:  reports that she has quit smoking. She smoked 0.00 packs per day. She has never  used smokeless tobacco. She reports that she does not drink alcohol or use drugs.  MEDICATIONS:  Medication Sig     acetaminophen 325 MG tablet Take 2 tablets (650 mg) by mouth 3 times daily     aspirin (ASA) 81 MG tablet Take 1 tablet (81 mg) by mouth daily     carvedilol (COREG) 3.125 MG tablet Take 1 tablet (3.125 mg) by mouth BID (with meals)     cloNIDine (CATAPRES) 0.2 MG tablet Take 0.5 tablets (0.1 mg) by mouth daily     digoxin (LANOXIN) 125 MCG tablet Take 1 tablet (125 mcg) by mouth every other day     gabapentin 100 MG capsule Take 100 mg by mouth At Bedtime     IMDUR 60 MG 24 hr tablet Take 1 tablet (60 mg) by mouth daily     lisinopril  5 MG tablet Take 1 tablet (5 mg) by mouth daily     morphine 5 MG solu-tab Take 2.5 mg by mouth every 2 hours as needed for shortness of breath / dyspnea or moderate to severe pain     MULTIPLE VITAMINS-MINERALS PO Take 1 tablet by mouth daily     nitroGLYcerin (NITROSTAT) 0.4 MG sublingual tablet For chest pain place 1 tablet under the tongue every 5 minutes for 3 doses. If symptoms persist 5 minutes after 1st dose call 911.     omeprazole 20 MG tablet Take 20 mg by mouth three times a week M/W/F     ondansetron (ZOFRAN) 4 MG tablet Take 4 mg by mouth Q6h PRN for nausea     polyethylene glycol packet Take 17 g by mouth daily     senna (SENOKOT) 8.6 MG tablet Take 1 tablet by mouth 2 times daily     sertraline (ZOLOFT) 25 MG tablet Take 75 mg by mouth daily      Case Management:  I have reviewed the care plan and MDS and do agree with the plan. Patient's desire to return to the community is not assessible due to cognitive impairment. Information reviewed: Medications, vital signs, orders, and nursing notes.    ROS: Limited secondary to cognitive impairment but today pt reports the above and 4 point ROS including Respiratory, CV, GI and , other than that noted in the HPI, is negative.    Vitals:  BP (!) 127/93   Pulse 98   Temp 97.7  F (36.5  C)   Resp 18   Ht  "1.702 m (5' 7\")   Wt 56.7 kg (125 lb)   SpO2 99%   BMI 19.58 kg/m     Body mass index is 19.58 kg/m .  Exam:  GENERAL APPEARANCE: Alert, in no distress, cooperative.   ENT: Mouth/posterior oropharynx intact w/ moist mucous membranes, hearing acuity [place].  EYES: EOM, conjunctivae, lids, pupils and irises normal, PERRL2.   RESP: Respiratory effort good, no respiratory distress, Lung sounds clear. On RA.   CV: Auscultation of heart reveals S1, S2, rate-controlled and rhythm irregular, 2/6 systolic murmur, no rub or gallop, Edema 0+ BLE. Peripheral pulses are 2+.  ABDOMEN: Normal bowel sounds, soft, non-tender abdomen, and no masses palpated.  SKIN: Inspection/Palpation of skin and subcutaneous tissue baseline w/ fragility. No wounds/rashes noted.   NEURO: CN II-XII at patient's baseline, sensation baseline PPS.  PSYCH: Insight, judgement, and memory are baseline, affect and mood are flat/engaged.    Lab/Diagnostic data:       ASSESSMENT/PLAN  NSTEMI (non-ST elevated myocardial infarction) (H)  Chronic combined systolic and diastolic congestive heart failure (H)  Chronic atrial fibrillation  Essential hypertension  Dementia with behavioral disturbance, unspecified dementia type (H)  Hospice care patient  Abdominal aortic aneurysm (AAA) without rupture (H)  Frequent Falls  Gross Hematuria  Acute-on-chronic. Tenuous. Risks outweight benefits at this point for continuation of anticoagulation, and per ASHA Deann we will discontinue Eliquis (10 minutes via phone). Deann stated understanding that this will increase her risk of stroke or VTE. We will assess Hematuria/falls w/ CBC and UA/UC. We not the high risk associated w/ Digoxin (BEERs), and will therefore start GDR and control rate w/ Coreg. Given BP effects of Coreg, will continue GDR of Clonidine. Hospice was updated. Follow-up w/in 1 week or as needed.    Total time with patient visit: 40 minutes. Greater than 50% of total time spent with counseling and coordinating " care included discussions about the POC and care coordination with the patient, first contact Deann, unit nursing staff (Andreea and Eriberto) and hospice nurse Arianna.     Orders written by provider at facility and transcribed by : Bubba Diaz  1. CBC x1 on 12/6 Dx: Fall  2. UA/UC x1 on 12/14 via clean catch into sterile cup. Dx: Hematuria.   3. Coreg 3.125 mg PO BID Dx: afib.   4. Decrease clonidine form 0.1 mg BID to 0.1 mg PO daily at 0800. Dx: HTN.   5. Change digoxin dosing from daily to every other day Dx: Afib.   6. Discontinue Eliquis.  FYI: Next visit, consider discontinuation of Clonidine and Digoxin. Repeat labs if needed, and follow-up on urine. May consider discontinuation of PPI at next visit.     Electronically signed by:  JADA Spence CNP [facility]        Sincerely,        JADA Branham CNP

## 2019-12-06 NOTE — LETTER
12/6/2019        RE: Citlalli Villarreal  [facility]  [address] [facility] [address]        [place] [facility]  Chief Complaint   Patient presents with     FPC Acute   [place] Medical Record Number: [id number]. Place of Service where encounter took place:  [facility]  ([facility]) [[id number]]. HPI: Citlalli Villarreal  is 73 year old (1945), who is being seen today for a federally mandated E/M visit.  HPI information obtained from: facility chart records, facility staff, patient report, [facility] chart review and Care Everywhere [facility] chart review. Today's concerns are:    NSTEMI (non-ST elevated myocardial infarction) (H)  Chronic combined systolic and diastolic congestive heart failure (H)  Chronic atrial fibrillation  Essential hypertension  Dementia with behavioral disturbance, unspecified dementia type (H)  Hospice care patient  Abdominal aortic aneurysm (AAA) without rupture (H)  Frequent Falls  Gross Hematuria  Last visit we made many changes to regimen including the discontinuation of her anticoagulant, GDR of Clonidine/Digoxin, UA/UC, and start of Coreg. We are closely monitoring and following-up today on BP/HR trend. Patient denies CP, SOB, dizziness, headache, nausea. UA resulted positive for abnormalities as noted below, UC showed no growth. Patient does not allow for nursing to assist her with personal cares much, and therefore nursing is unclear if blood is solely urinary, vs GI/vaginal. CBC showed no leukocytosis or acute blood loss.   BPs:  12/6/2019 09:23 153/102 mmHg   12/6/2019 01:10 127/98 mmHg   12/5/2019 08:47 138/80 mmHg   12/5/2019 02:30 108/81 mmHg   12/4/2019 23:49 117/79 mmHg   12/4/2019 18:46 98/68 mmHg   12/4/2019 10:15 120/80 mmHg  HRs:  12/6/2019 01:10 94 bpm   12/5/2019 08:49 69 bpm  12/5/2019 02:30 56 bpm   12/4/2019 23:49 53 bpm  12/4/2019 18:46 96 bpm     PMH/PSH reviewed in EPIC today.  ROS: Limited secondary to cognitive  impairment but today pt reports the above and 4 point ROS including Respiratory, CV, GI and , other than that noted in the HPI, is negative.    Vitals:  BP (!) 153/102   Pulse 94   Temp 97.6  F (36.4  C)   Resp 16   Wt 56.7 kg (125 lb)   SpO2 100%   BMI 19.58 kg/m     Body mass index is 19.58 kg/m .  Exam:  GENERAL APPEARANCE: Alert, in no distress, cooperative.   ENT: Mouth/posterior oropharynx intact w/ moist mucous membranes, hearing acuity [place].  EYES: EOM, conjunctivae, lids, pupils and irises normal, PERRL2.   RESP: Respiratory effort good, no respiratory distress, Lung sounds clear. On RA.   CV: Auscultation of heart reveals S1, S2, rate-controlled and rhythm irregular, 2/6 systolic murmur, no rub or gallop, Edema 0+ BLE. Peripheral pulses are 2+.  ABDOMEN: Normal bowel sounds, soft, non-tender abdomen, and no masses palpated.  SKIN: Inspection/Palpation of skin and subcutaneous tissue baseline w/ fragility. No wounds/rashes noted.   NEURO: CN II-XII at patient's baseline, sensation baseline PPS.  PSYCH: Insight, judgement, and memory are baseline, affect and mood are flat/engaged.    Lab/Diagnostic data:       ASSESSMENT/PLAN  NSTEMI (non-ST elevated myocardial infarction) (H)  Chronic combined systolic and diastolic congestive heart failure (H)  Chronic atrial fibrillation  Essential hypertension  Dementia with behavioral disturbance, unspecified dementia type (H)  Hospice care patient  Abdominal aortic aneurysm (AAA) without rupture (H)  Frequent Falls  Gross Hematuria  Acute-on-chronic. Tenuous. Will continue GDR of both Clonidine and Digoxin. If rate-control becomes problematic, will increase BB, which may also assist w/ BP and CP control. We will discontinue MVI and recheck labs next week.  Given unknown source of bleeding, we will increase PPI for better coverage (potential for GIB). Follow-up w/in 1 week or as needed.    Orders written by provider at facility and transcribed by team  coordinator: Bubba Diaz  1. On 12/8/19 Discontinue clonidine.  2. On 12/9/19 Discontinue digoxin.  3. Increase omeprazole to 20 mg PO every day Dx: PPI PPX  4. discontinue MVI  5. CBC and BMP x1 on 12/11 Dx: Hematuria    Electronically signed by:  Dr. Jennifer Richards, APRN CNP DNP      Sincerely,        JADA Branham CNP

## 2019-12-10 NOTE — LETTER
"    12/10/2019        RE: Citlalli Villarreal  [facility]  [address] [facility] [address]        [place] [facility]  [place] Medical Record Number:  [id number]  Place of Service where encounter took place:  [facility]  ([facility]GS) [[id number]]  Chief Complaint   Patient presents with     Nursing Home Acute   HPI: Citlalli Villarreal  is a 73 year old (1945), who is being seen today for an episodic care visit.  HPI information obtained from: facility chart records, facility staff, patient report, [facility] [place] chart review and Care Everywhere [facility] chart review. Today's concern is:    Chronic combined systolic and diastolic congestive heart failure (H)  Chronic atrial fibrillation  Essential hypertension  Dementia with behavioral disturbance, unspecified dementia type (H)  Abdominal aortic aneurysm (AAA) without rupture (H)  NSTEMI (non-ST elevated myocardial infarction) (H)  Frequent falls  Gross hematuria  Hospice care patient  Last few visits, we have been diligent in weaning off Digoxin and Clonidine, and starting low-dose Coreg for overall better BP/HR control, and for added safety benefits of this drug. We are also following-up today, as patient's Eliquis was stopped secondary to falling and gross hematuria last week. She remains on baby aspirin. Her UC was negative for infection. Nursing noted that Citlalli often does her own personal cares, and therefore we could not completely rule-out blood from vaginal or GI origin, and therefore her PPI was increased from 3x/wk to daily.       Today, Citlalli denies CP, dizziness, headache, SOB, edema, cough, nausea, and states that there is no longer any blood in her urine. She states \"one day, it just stopped.\" Given her many recent changes, we attempted to obtain blood work, but Citlalli has refused. Her BP/HR trend during and after these changes is as noted below:  BPs:  12/9/2019 20:49 99/72 mmHg   12/8/2019 13:49 106/74 mmHg   12/8/2019 " "12:38 146/118 mmHg  12/8/2019 10:24 126/77 mmHg   12/8/2019 07:00 135/92 mmHg   HRs:  12/9/2019 20:49 72 bpm   12/8/2019 13:49 69 bpm   12/8/2019 12:38 129 bpm   12/8/2019 07:00 95 bpm   12/7/2019 11:00 59 bpm   12/7/2019 07:33 82 bpm <--Last dose of Digoxin.    Past Medical and Surgical History reviewed in Epic today.  REVIEW OF SYSTEMS: Limited secondary to cognitive impairment but today pt reports the above and 4 point ROS including Respiratory, CV, GI and , other than that noted in the HPI, is negative.    Objective:  BP 99/72   Pulse 72   Temp 97.6  F (36.4  C)   Resp 16   Ht 1.702 m (5' 7\")   Wt 55.7 kg (122 lb 12.8 oz)   SpO2 96%   BMI 19.23 kg/m     Exam:  GENERAL APPEARANCE: Alert, in no distress, cooperative.   ENT: Mouth/posterior oropharynx intact w/ moist mucous membranes, hearing acuity [place].  EYES: EOM, conjunctivae, lids, pupils and irises normal, PERRL2.   RESP: Respiratory effort good, no respiratory distress, Lung sounds clear. On RA.   CV: Auscultation of heart reveals S1, S2, rate-controlled and rhythm irregular, 2/6 systolic murmur, no rub or gallop, Edema 0+ BLE. Peripheral pulses are 2+.  ABDOMEN: Normal bowel sounds, soft, non-tender abdomen, and no masses palpated.  SKIN: Inspection/Palpation of skin and subcutaneous tissue baseline w/ fragility. No wounds/rashes noted.   NEURO: CN II-XII at patient's baseline, sensation baseline PPS.  PSYCH: Insight, judgement, and memory are impaired at baseline, affect and mood are happy/engaged.    Labs:   Labs done in SNF are in [place] EPIC. Please refer to them using [employer]/Care Everywhere.    ASSESSMENT/PLAN:  Chronic combined systolic and diastolic congestive heart failure (H)  Chronic atrial fibrillation  Essential hypertension  Dementia with behavioral disturbance, unspecified dementia type (H)  Abdominal aortic aneurysm (AAA) without rupture (H)  NSTEMI (non-ST elevated myocardial infarction) (H)  Frequent falls  Gross hematuria  Hospice " care patient  Acute-on-chronic. Ongoing. Patient has tolerated medication changes. Will continue w/ BP medication reduction. Given her hospice status, it is reasonable to not collect blood work if this is her wish. We may recheck at a future date.  Will continue to closely trend HR/BP now that she is off Digoxin and Clonidine. Follow-up w/in 2 weeks or as needed.    Orders transcribed by : Petra Jackson MA  1. Decrease lisinopril from 5 mg to 2.5 mg daily. HTN.  Ok to cancel labs on 12/11 if patient refuses.      Electronically signed by:  Dr. Jennifer Richards, JADA CNP DNP        Sincerely,        JADA Branham CNP

## 2019-12-10 NOTE — PROGRESS NOTES
"[place] [facility]  [place] Medical Record Number:  [id number]  Place of Service where encounter took place:  [facility]  (FGS) [[id number]]  Chief Complaint   Patient presents with     Nursing Home Acute   HPI: Citlalli Villarreal  is a 73 year old (1945), who is being seen today for an episodic care visit.  HPI information obtained from: facility chart records, facility staff, patient report, [facility] chart review and Care Everywhere [facility] chart review. Today's concern is:    Chronic combined systolic and diastolic congestive heart failure (H)  Chronic atrial fibrillation  Essential hypertension  Dementia with behavioral disturbance, unspecified dementia type (H)  Abdominal aortic aneurysm (AAA) without rupture (H)  NSTEMI (non-ST elevated myocardial infarction) (H)  Frequent falls  Gross hematuria  Hospice care patient  Last few visits, we have been diligent in weaning off Digoxin and Clonidine, and starting low-dose Coreg for overall better BP/HR control, and for added safety benefits of this drug. We are also following-up today, as patient's Eliquis was stopped secondary to falling and gross hematuria last week. She remains on baby aspirin. Her UC was negative for infection. Nursing noted that Citlalli often does her own personal cares, and therefore we could not completely rule-out blood from vaginal or GI origin, and therefore her PPI was increased from 3x/wk to daily.       Today, Citlalli denies CP, dizziness, headache, SOB, edema, cough, nausea, and states that there is no longer any blood in her urine. She states \"one day, it just stopped.\" Given her many recent changes, we attempted to obtain blood work, but Citlalli has refused. Her BP/HR trend during and after these changes is as noted below:  BPs:  12/9/2019 20:49 99/72 mmHg   12/8/2019 13:49 106/74 mmHg   12/8/2019 12:38 146/118 mmHg  12/8/2019 10:24 126/77 mmHg   12/8/2019 07:00 135/92 mmHg   HRs:  12/9/2019 20:49 72 bpm " "  12/8/2019 13:49 69 bpm   12/8/2019 12:38 129 bpm   12/8/2019 07:00 95 bpm   12/7/2019 11:00 59 bpm   12/7/2019 07:33 82 bpm <--Last dose of Digoxin.    Past Medical and Surgical History reviewed in Epic today.  REVIEW OF SYSTEMS: Limited secondary to cognitive impairment but today pt reports the above and 4 point ROS including Respiratory, CV, GI and , other than that noted in the HPI, is negative.    Objective:  BP 99/72   Pulse 72   Temp 97.6  F (36.4  C)   Resp 16   Ht 1.702 m (5' 7\")   Wt 55.7 kg (122 lb 12.8 oz)   SpO2 96%   BMI 19.23 kg/m    Exam:  GENERAL APPEARANCE: Alert, in no distress, cooperative.   ENT: Mouth/posterior oropharynx intact w/ moist mucous membranes, hearing acuity [place].  EYES: EOM, conjunctivae, lids, pupils and irises normal, PERRL2.   RESP: Respiratory effort good, no respiratory distress, Lung sounds clear. On RA.   CV: Auscultation of heart reveals S1, S2, rate-controlled and rhythm irregular, 2/6 systolic murmur, no rub or gallop, Edema 0+ BLE. Peripheral pulses are 2+.  ABDOMEN: Normal bowel sounds, soft, non-tender abdomen, and no masses palpated.  SKIN: Inspection/Palpation of skin and subcutaneous tissue baseline w/ fragility. No wounds/rashes noted.   NEURO: CN II-XII at patient's baseline, sensation baseline PPS.  PSYCH: Insight, judgement, and memory are impaired at baseline, affect and mood are happy/engaged.    Labs:   Labs done in SNF are in [facility]. Please refer to them using [employer]/Care Everywhere.    ASSESSMENT/PLAN:  Chronic combined systolic and diastolic congestive heart failure (H)  Chronic atrial fibrillation  Essential hypertension  Dementia with behavioral disturbance, unspecified dementia type (H)  Abdominal aortic aneurysm (AAA) without rupture (H)  NSTEMI (non-ST elevated myocardial infarction) (H)  Frequent falls  Gross hematuria  Hospice care patient  Acute-on-chronic. Ongoing. Patient has tolerated medication changes. Will continue w/ BP " medication reduction. Given her hospice status, it is reasonable to not collect blood work if this is her wish. We may recheck at a future date.  Will continue to closely trend HR/BP now that she is off Digoxin and Clonidine. Follow-up w/in 2 weeks or as needed.    Orders transcribed by : Petra Jackson MA  1. Decrease lisinopril from 5 mg to 2.5 mg daily. HTN.  Ok to cancel labs on 12/11 if patient refuses.      Electronically signed by:  Dr. Jennifer Richards, APRN CNP DNP

## 2020-01-01 ENCOUNTER — NURSING HOME VISIT (OUTPATIENT)
Dept: GERIATRICS | Facility: CLINIC | Age: 75
End: 2020-01-01
Payer: COMMERCIAL

## 2020-01-01 ENCOUNTER — DOCUMENTATION ONLY (OUTPATIENT)
Dept: GERIATRICS | Facility: CLINIC | Age: 75
End: 2020-01-01

## 2020-01-01 VITALS
DIASTOLIC BLOOD PRESSURE: 112 MMHG | WEIGHT: 116 LBS | TEMPERATURE: 97.6 F | SYSTOLIC BLOOD PRESSURE: 186 MMHG | BODY MASS INDEX: 18.17 KG/M2 | HEART RATE: 116 BPM | RESPIRATION RATE: 18 BRPM | OXYGEN SATURATION: 94 %

## 2020-01-01 VITALS
OXYGEN SATURATION: 96 % | WEIGHT: 118.8 LBS | DIASTOLIC BLOOD PRESSURE: 67 MMHG | HEART RATE: 56 BPM | BODY MASS INDEX: 18.61 KG/M2 | SYSTOLIC BLOOD PRESSURE: 121 MMHG | RESPIRATION RATE: 16 BRPM | TEMPERATURE: 96.9 F

## 2020-01-01 VITALS
SYSTOLIC BLOOD PRESSURE: 134 MMHG | OXYGEN SATURATION: 93 % | HEART RATE: 67 BPM | WEIGHT: 116.4 LBS | RESPIRATION RATE: 20 BRPM | DIASTOLIC BLOOD PRESSURE: 84 MMHG | BODY MASS INDEX: 18.23 KG/M2 | TEMPERATURE: 97.5 F

## 2020-01-01 VITALS
OXYGEN SATURATION: 95 % | SYSTOLIC BLOOD PRESSURE: 121 MMHG | HEART RATE: 57 BPM | TEMPERATURE: 97.7 F | DIASTOLIC BLOOD PRESSURE: 70 MMHG | WEIGHT: 116 LBS | BODY MASS INDEX: 18.17 KG/M2 | RESPIRATION RATE: 16 BRPM

## 2020-01-01 VITALS
HEART RATE: 58 BPM | TEMPERATURE: 97.7 F | WEIGHT: 116 LBS | DIASTOLIC BLOOD PRESSURE: 92 MMHG | SYSTOLIC BLOOD PRESSURE: 122 MMHG | BODY MASS INDEX: 18.17 KG/M2 | OXYGEN SATURATION: 93 % | RESPIRATION RATE: 18 BRPM

## 2020-01-01 VITALS
HEIGHT: 67 IN | SYSTOLIC BLOOD PRESSURE: 140 MMHG | WEIGHT: 116 LBS | OXYGEN SATURATION: 94 % | BODY MASS INDEX: 18.21 KG/M2 | HEART RATE: 65 BPM | DIASTOLIC BLOOD PRESSURE: 108 MMHG | TEMPERATURE: 97.6 F | RESPIRATION RATE: 16 BRPM

## 2020-01-01 DIAGNOSIS — R29.6 FALLS FREQUENTLY: ICD-10-CM

## 2020-01-01 DIAGNOSIS — I21.4 NSTEMI (NON-ST ELEVATED MYOCARDIAL INFARCTION) (H): ICD-10-CM

## 2020-01-01 DIAGNOSIS — I50.42 CHRONIC COMBINED SYSTOLIC AND DIASTOLIC CONGESTIVE HEART FAILURE (H): Primary | ICD-10-CM

## 2020-01-01 DIAGNOSIS — I48.20 CHRONIC ATRIAL FIBRILLATION (H): ICD-10-CM

## 2020-01-01 DIAGNOSIS — I10 ESSENTIAL HYPERTENSION: ICD-10-CM

## 2020-01-01 DIAGNOSIS — F03.91 DEMENTIA WITH BEHAVIORAL DISTURBANCE, UNSPECIFIED DEMENTIA TYPE: Primary | ICD-10-CM

## 2020-01-01 DIAGNOSIS — F03.90 DEMENTIA WITHOUT BEHAVIORAL DISTURBANCE, UNSPECIFIED DEMENTIA TYPE: ICD-10-CM

## 2020-01-01 DIAGNOSIS — I48.19 PERSISTENT ATRIAL FIBRILLATION (H): ICD-10-CM

## 2020-01-01 DIAGNOSIS — I65.23 CAROTID STENOSIS, BILATERAL: ICD-10-CM

## 2020-01-01 DIAGNOSIS — I71.40 ABDOMINAL AORTIC ANEURYSM (AAA) WITHOUT RUPTURE (H): ICD-10-CM

## 2020-01-01 DIAGNOSIS — E44.0 MODERATE PROTEIN-CALORIE MALNUTRITION (H): ICD-10-CM

## 2020-01-01 DIAGNOSIS — F22 PARANOIA (H): ICD-10-CM

## 2020-01-01 DIAGNOSIS — R54 FRAIL ELDERLY: ICD-10-CM

## 2020-01-01 DIAGNOSIS — I50.42 CHRONIC COMBINED SYSTOLIC AND DIASTOLIC CONGESTIVE HEART FAILURE (H): ICD-10-CM

## 2020-01-01 DIAGNOSIS — Z86.73 HISTORY OF EMBOLIC STROKE: ICD-10-CM

## 2020-01-01 DIAGNOSIS — Z51.5 HOSPICE CARE PATIENT: ICD-10-CM

## 2020-01-01 DIAGNOSIS — I63.422 CEREBROVASCULAR ACCIDENT (CVA) DUE TO EMBOLISM OF LEFT ANTERIOR CEREBRAL ARTERY (H): ICD-10-CM

## 2020-01-01 DIAGNOSIS — I11.9 HYPERTENSIVE HEART DISEASE WITHOUT HEART FAILURE: ICD-10-CM

## 2020-01-01 DIAGNOSIS — F22 PARANOIA (H): Primary | ICD-10-CM

## 2020-01-01 DIAGNOSIS — I71.40 ABDOMINAL AORTIC ANEURYSM (AAA) WITHOUT RUPTURE (H): Primary | ICD-10-CM

## 2020-01-01 PROCEDURE — 99309 SBSQ NF CARE MODERATE MDM 30: CPT | Mod: GV | Performed by: NURSE PRACTITIONER

## 2020-01-01 PROCEDURE — 99309 SBSQ NF CARE MODERATE MDM 30: CPT | Mod: GV | Performed by: FAMILY MEDICINE

## 2020-01-01 RX ORDER — ISOSORBIDE MONONITRATE 30 MG/1
30 TABLET, EXTENDED RELEASE ORAL DAILY
Start: 2020-01-01 | End: 2020-01-01

## 2020-01-01 RX ORDER — QUETIAPINE FUMARATE 50 MG/1
12.5 TABLET, FILM COATED ORAL 2 TIMES DAILY
COMMUNITY

## 2020-01-01 RX ORDER — TRAZODONE HYDROCHLORIDE 50 MG/1
12.5 TABLET, FILM COATED ORAL AT BEDTIME
COMMUNITY
End: 2020-01-01

## 2020-01-01 ASSESSMENT — MIFFLIN-ST. JEOR: SCORE: 1053.8

## 2020-01-01 NOTE — PROGRESS NOTES
Pt awake to void, standy by with arm holding pt as she is unsteady on her feet.  Stand by pt also causes her to become upset.  Directed pt to chair to eat some lunch, had 1 bite of sandwich and x2 chocolate boost which pt calls chocolate milk and really likes.  Requested 2 to be sent with meal tray as she did not eat much else today.    Central venous catheter in place

## 2020-01-05 NOTE — PROGRESS NOTES
[place] [facility]  Chief Complaint   Patient presents with     detention Regulatory   [place] Medical Record Number:  [id number]  Place of Service where encounter took place:  [facility]  (FGS) [[id number]]  HPI: Citlalli Villarreal  is 74 year old (1945), who is being seen today for a federally mandated E/M visit.  HPI information obtained from: facility chart records, facility staff, patient report, [place] Epic chart review and Care Everywhere Epic chart review. Today's concerns are:    Chronic combined systolic and diastolic congestive heart failure (H)  Chronic atrial fibrillation  Essential hypertension  Falls frequently  Hospice care patient  Following up today as patient has had several falls recently. During last visit, we stopped anticoagulation for afib, secondary to her numerous unwitnessed falls (w/ consent of decision-maker). During previous visits, we also GDR'd Clonidine and Digoxin, both of which were successful and have now been discontinued. Nursing reports episodes of orthostasis, but this is not well-documented. Today, Citlalli denies CP, SOB, palpitations, dizziness, fever, or aches/pains. Her pertinent VS trends are noted below:    BPs:  1/4/2020 16:03 129 / 104 mmHg   1/3/2020 14:45 129 / 96 mmHg   1/3/2020 10:45 142 / 82 mmHg   1/3/2020 07:10 118 / 91 mmHg   1/3/2020 06:45 124 / 77 mmHg   1/3/2020 02:45 116 / 71 mmHg  1/3/2020 00:45 123 / 78 mmHg   1/2/2020 22:45 126 / 76 mmHg   HRs:  1/4/2020 16:03 120 bpm  1/3/2020 14:45 55 bpm   1/3/2020 10:45 92 bpm   1/3/2020 06:45 88 bpm  1/3/2020 02:45 64 bpm    ALLERGIES:Patient has no known allergies. PAST MEDICAL HISTORY:   has a past medical history of Atrial fibrillation (H), CVA (cerebral vascular accident) (H), Dementia (H), Gastroesophageal reflux disease, History of embolic stroke with hemorrhagic conversion 10/15/2018. (3/10/2019), Hyperlipemia, Hypertension, Major depressive disorder, Occlusion and stenosis of  bilateral carotid arteries, Repeated falls, and Right wrist fracture (10/2018). PAST SURGICAL HISTORY:   has a past surgical history that includes Forearm surgery (Right, 10/2018). FAMILY HISTORY: family history includes No Known Problems in her father and mother. SOCIAL HISTORY:  reports that she has quit smoking. She smoked 0.00 packs per day. She has never used smokeless tobacco. She reports that she does not drink alcohol or use drugs.  MEDICATIONS:  Current Outpatient Medications   Medication Sig Dispense Refill     acetaminophen (TYLENOL) 325 MG tablet Take 2 tablets (650 mg) by mouth 3 times daily       aspirin (ASA) 81 MG tablet Take 1 tablet (81 mg) by mouth daily  OTC     carvedilol (COREG) 3.125 MG tablet Take 1 tablet (3.125 mg) by mouth 2 times daily (with meals)       gabapentin (NEURONTIN) 100 MG capsule Take 100 mg by mouth At Bedtime       isosorbide mononitrate (IMDUR) 60 MG 24 hr tablet Take 1 tablet (60 mg) by mouth daily       lisinopril (PRINIVIL/ZESTRIL) 5 MG tablet Take 2.5 mg by mouth daily       morphine 5 MG solu-tab Take 2.5 mg by mouth every 2 hours as needed for shortness of breath / dyspnea or moderate to severe pain       nitroGLYcerin (NITROSTAT) 0.4 MG sublingual tablet For chest pain place 1 tablet under the tongue every 5 minutes for 3 doses. If symptoms persist 5 minutes after 1st dose call 911.       omeprazole 20 MG tablet Take 20 mg by mouth daily        ondansetron (ZOFRAN) 4 MG tablet Take 4 mg by mouth every 6 hours as needed for nausea       polyethylene glycol (MIRALAX/GLYCOLAX) packet Take 17 g by mouth daily       senna (SENOKOT) 8.6 MG tablet Take 1 tablet by mouth 2 times daily       sertraline (ZOLOFT) 25 MG tablet Take 75 mg by mouth daily        Case Management:  I have reviewed the care plan and MDS and do agree with the plan. Patient's desire to return to the community is not assessible due to cognitive impairment. Information reviewed:  Medications, vital signs,  orders, and nursing notes.    ROS: Limited secondary to cognitive impairment but today pt reports the above and 4 point ROS including Respiratory, CV, GI and , other than that noted in the HPI, is negative.    Vitals:  /67   Pulse 56   Temp 96.9  F (36.1  C)   Resp 16   Wt 53.9 kg (118 lb 12.8 oz)   SpO2 96%   BMI 18.61 kg/m    Body mass index is 18.61 kg/m .  Exam:  GENERAL APPEARANCE: Alert, in no distress, cooperative.   ENT: Mouth/posterior oropharynx intact w/ moist mucous membranes, hearing acuity [place].  EYES: EOM, conjunctivae, lids, pupils and irises normal, PERRL2.   RESP: Respiratory effort good, no respiratory distress, Lung sounds clear. On RA.   CV: Auscultation of heart reveals S1, S2, rate variable and rhythm irregular, 2/6 systolic murmur, no rub or gallop, Edema 0+ BLE. Peripheral pulses are 2+.  ABDOMEN: Normal bowel sounds, soft, non-tender abdomen, and no masses palpated.  SKIN: Inspection/Palpation of skin and subcutaneous tissue baseline w/ fragility. No wounds/rashes noted.   NEURO: CN II-XII at patient's baseline, sensation baseline PPS.  PSYCH: Insight, judgement, and memory are baseline impaired, affect and mood are pleasant.    Lab/Diagnostic data:   Labs done in SNF are in [place] EPIC. Please refer to them using "[employer]/Care Everywhere.    ASSESSMENT/PLAN  Chronic combined systolic and diastolic congestive heart failure (H)  Chronic atrial fibrillation  Essential hypertension  Falls frequently  Hospice care patient  Acute-on-chronic. Ongoing. To decrease the risk of falls overnight, we will discontinue her already low-dose Gabapentin. Given reports of orthostasis w/ falls by nursing, and given patient does not have CP, will slightly decrease already low-dose Imdur, but keep for some nitrate effect given known cardiac hx and recent hx of CP. Follow-up routinely or as needed.    Orders written by provider at facility and transcribed by : Bubba Diaz  1. Decrease  Imdur XR from 60 mg to 30 mg PO every day Dx: CAD.  2. Discontinue Gabapentin.    Electronically signed by:  Dr. Jennifer Richards, APRN CNP DNP

## 2020-01-06 NOTE — LETTER
1/6/2020        RE: Citlalli Villarreal  [facility]  [address] [facility] [address]        [place] [facility]  Chief Complaint   Patient presents with     custodial Regulatory   [place] Medical Record Number:  [id number]  Place of Service where encounter took place:  [facility]  (FGS) [[id number]]  HPI: Citlalli Villarreal  is 74 year old (1945), who is being seen today for a federally mandated E/M visit.  HPI information obtained from: facility chart records, facility staff, patient report, [facility] chart review and Care Everywhere [facility] chart review. Today's concerns are:    Chronic combined systolic and diastolic congestive heart failure (H)  Chronic atrial fibrillation  Essential hypertension  Falls frequently  Hospice care patient  Following up today as patient has had several falls recently. During last visit, we stopped anticoagulation for afib, secondary to her numerous unwitnessed falls (w/ consent of decision-maker). During previous visits, we also GDR'd Clonidine and Digoxin, both of which were successful and have now been discontinued. Nursing reports episodes of orthostasis, but this is not well-documented. Today, Citlalli denies CP, SOB, palpitations, dizziness, fever, or aches/pains. Her pertinent VS trends are noted below:    BPs:  1/4/2020 16:03 129 / 104 mmHg   1/3/2020 14:45 129 / 96 mmHg   1/3/2020 10:45 142 / 82 mmHg   1/3/2020 07:10 118 / 91 mmHg   1/3/2020 06:45 124 / 77 mmHg   1/3/2020 02:45 116 / 71 mmHg  1/3/2020 00:45 123 / 78 mmHg   1/2/2020 22:45 126 / 76 mmHg   HRs:  1/4/2020 16:03 120 bpm  1/3/2020 14:45 55 bpm   1/3/2020 10:45 92 bpm   1/3/2020 06:45 88 bpm  1/3/2020 02:45 64 bpm    ALLERGIES:Patient has no known allergies. PAST MEDICAL HISTORY:   has a past medical history of Atrial fibrillation (H), CVA (cerebral vascular accident) (H), Dementia (H), Gastroesophageal reflux disease, History of embolic stroke with hemorrhagic  conversion 10/15/2018. (3/10/2019), Hyperlipemia, Hypertension, Major depressive disorder, Occlusion and stenosis of bilateral carotid arteries, Repeated falls, and Right wrist fracture (10/2018). PAST SURGICAL HISTORY:   has a past surgical history that includes Forearm surgery (Right, 10/2018). FAMILY HISTORY: family history includes No Known Problems in her father and mother. SOCIAL HISTORY:  reports that she has quit smoking. She smoked 0.00 packs per day. She has never used smokeless tobacco. She reports that she does not drink alcohol or use drugs.  MEDICATIONS:  Current Outpatient Medications   Medication Sig Dispense Refill     acetaminophen (TYLENOL) 325 MG tablet Take 2 tablets (650 mg) by mouth 3 times daily       aspirin (ASA) 81 MG tablet Take 1 tablet (81 mg) by mouth daily  OTC     carvedilol (COREG) 3.125 MG tablet Take 1 tablet (3.125 mg) by mouth 2 times daily (with meals)       gabapentin (NEURONTIN) 100 MG capsule Take 100 mg by mouth At Bedtime       isosorbide mononitrate (IMDUR) 60 MG 24 hr tablet Take 1 tablet (60 mg) by mouth daily       lisinopril (PRINIVIL/ZESTRIL) 5 MG tablet Take 2.5 mg by mouth daily       morphine 5 MG solu-tab Take 2.5 mg by mouth every 2 hours as needed for shortness of breath / dyspnea or moderate to severe pain       nitroGLYcerin (NITROSTAT) 0.4 MG sublingual tablet For chest pain place 1 tablet under the tongue every 5 minutes for 3 doses. If symptoms persist 5 minutes after 1st dose call 911.       omeprazole 20 MG tablet Take 20 mg by mouth daily        ondansetron (ZOFRAN) 4 MG tablet Take 4 mg by mouth every 6 hours as needed for nausea       polyethylene glycol (MIRALAX/GLYCOLAX) packet Take 17 g by mouth daily       senna (SENOKOT) 8.6 MG tablet Take 1 tablet by mouth 2 times daily       sertraline (ZOLOFT) 25 MG tablet Take 75 mg by mouth daily        Case Management:  I have reviewed the care plan and MDS and do agree with the plan. Patient's desire to  return to the community is not assessible due to cognitive impairment. Information reviewed:  Medications, vital signs, orders, and nursing notes.    ROS: Limited secondary to cognitive impairment but today pt reports the above and 4 point ROS including Respiratory, CV, GI and , other than that noted in the HPI, is negative.    Vitals:  /67   Pulse 56   Temp 96.9  F (36.1  C)   Resp 16   Wt 53.9 kg (118 lb 12.8 oz)   SpO2 96%   BMI 18.61 kg/m     Body mass index is 18.61 kg/m .  Exam:  GENERAL APPEARANCE: Alert, in no distress, cooperative.   ENT: Mouth/posterior oropharynx intact w/ moist mucous membranes, hearing acuity [place].  EYES: EOM, conjunctivae, lids, pupils and irises normal, PERRL2.   RESP: Respiratory effort good, no respiratory distress, Lung sounds clear. On RA.   CV: Auscultation of heart reveals S1, S2, rate variable and rhythm irregular, 2/6 systolic murmur, no rub or gallop, Edema 0+ BLE. Peripheral pulses are 2+.  ABDOMEN: Normal bowel sounds, soft, non-tender abdomen, and no masses palpated.  SKIN: Inspection/Palpation of skin and subcutaneous tissue baseline w/ fragility. No wounds/rashes noted.   NEURO: CN II-XII at patient's baseline, sensation baseline PPS.  PSYCH: Insight, judgement, and memory are baseline impaired, affect and mood are pleasant.    Lab/Diagnostic data:   Labs done in SNF are in [place] EPIC. Please refer to them using [employer]/Care Everywhere.    ASSESSMENT/PLAN  Chronic combined systolic and diastolic congestive heart failure (H)  Chronic atrial fibrillation  Essential hypertension  Falls frequently  Hospice care patient  Acute-on-chronic. Ongoing. To decrease the risk of falls overnight, we will discontinue her already low-dose Gabapentin. Given reports of orthostasis w/ falls by nursing, and given patient does not have CP, will slightly decrease already low-dose Imdur, but keep for some nitrate effect given known cardiac hx and recent hx of CP. Follow-up  routinely or as needed.    Orders written by provider at facility and transcribed by : Bubba Diaz  1. Decrease Imdur XR from 60 mg to 30 mg PO every day Dx: CAD.  2. Discontinue Gabapentin.    Electronically signed by:  JADA Spence CNP DNP        Sincerely,        JADA Branham CNP

## 2020-01-15 NOTE — PROGRESS NOTES
[place] [facility]  Chief Complaint   Patient presents with     long-term Acute   [place] Medical Record Number:  [id number]  Place of Service where encounter took place:  [facility]  (FGS) [[id number]]  HPI: Citlalli Villarreal  is 74 year old (1945), who is being seen today for a federally mandated E/M visit.  HPI information obtained from: facility chart records, facility staff, patient report, [facility] chart review and Care Everywhere [facility] chart review. Today's concerns are:    Chronic combined systolic and diastolic congestive heart failure (H)  Chronic atrial fibrillation  Essential hypertension  Falls frequently  Hospice care patient  Following up today as patient has had 2 falls in 2 days. During last visit, we decreased Imdur and discontinued Gabapentin. During previous visits, we also GDR'd Clonidine and Digoxin, both of which were successful and have now been discontinued. Nursing reports episodes of orthostasis, but this is not well-documented. Today, Citlalli denies CP, SOB, palpitations, dizziness, fever, or aches/pains, GERD/nausea. Her pertinent VS trends are noted below:    BPs:  1/15/2020 16:06 111/74 mmHg   1/15/2020 11:12 122/92 mmHg   1/14/2020 17:08 120/48 mmHg   1/14/2020 15:46 132/85 mmHg      PMH/PSH reviewed in EPIC today.  ROS: Limited secondary to cognitive impairment but today pt reports the above and 4 point ROS including Respiratory, CV, GI and , other than that noted in the HPI, is negative.    Vitals:  BP (!) 122/92   Pulse 58   Temp 97.7  F (36.5  C)   Resp 18   Wt 52.6 kg (116 lb)   SpO2 93%   BMI 18.17 kg/m    Body mass index is 18.17 kg/m .  Exam:  GENERAL APPEARANCE: Alert, in no distress, cooperative.   ENT: Mouth/posterior oropharynx intact w/ moist mucous membranes, hearing acuity [place].  EYES: EOM, conjunctivae, lids, pupils and irises normal, PERRL2.   RESP: Respiratory effort good, no respiratory distress, Lung sounds clear. On RA.    CV: Auscultation of heart reveals S1, S2, rate variable and rhythm irregular, 2/6 systolic murmur, no rub or gallop, Edema 0+ BLE. Peripheral pulses are 2+.  ABDOMEN: Normal bowel sounds, soft, non-tender abdomen, and no masses palpated.  SKIN: Inspection/Palpation of skin and subcutaneous tissue baseline w/ fragility. No wounds/rashes noted.   NEURO: CN II-XII at patient's baseline, sensation baseline PPS.  PSYCH: Insight, judgement, and memory are baseline impaired, affect and mood are pleasant.    Lab/Diagnostic data:   Labs done in SNF are in [place] EPIC. Please refer to them using [employer]/Care Everywhere.    ASSESSMENT/PLAN  Chronic combined systolic and diastolic congestive heart failure (H)  Chronic atrial fibrillation  Essential hypertension  Falls frequently  Hospice care patient  Acute-on-chronic. Ongoing. To decrease falls, we will discontinue Imdur (nitroglycerin available PRN should CP return), and discontinue low-dose Lisinopril (as BP allows for this). Will decrease unnecessary medications as able by also starting GDR of Prilosec. Follow-up routinely or as needed.    Orders written by provider at facility and transcribed by : Bubba Diaz  1. Discontinue Lisinopril.  2. Decrease Omeprazole to 20 mg PO every day on M,W,F Dx: GERD  3. Discontinue Imdur    Electronically signed by:  Dr. Jennifer Richards, APRN CNP DNP

## 2020-01-15 NOTE — LETTER
1/15/2020        RE: Citlalli Villarreal  [facility]  [address] [facility] [address]        [place] [facility]  Chief Complaint   Patient presents with     jail Acute   [place] Medical Record Number:  [id number]  Place of Service where encounter took place:  [facility]  (FGS) [[id number]]  HPI: Citlalli Villarreal  is 74 year old (1945), who is being seen today for a federally mandated E/M visit.  HPI information obtained from: facility chart records, facility staff, patient report, [facility] chart review and Care Everywhere [facility] chart review. Today's concerns are:    Chronic combined systolic and diastolic congestive heart failure (H)  Chronic atrial fibrillation  Essential hypertension  Falls frequently  Hospice care patient  Following up today as patient has had 2 falls in 2 days. During last visit, we decreased Imdur and discontinued Gabapentin. During previous visits, we also GDR'd Clonidine and Digoxin, both of which were successful and have now been discontinued. Nursing reports episodes of orthostasis, but this is not well-documented. Today, Citlalli denies CP, SOB, palpitations, dizziness, fever, or aches/pains, GERD/nausea. Her pertinent VS trends are noted below:    BPs:  1/15/2020 16:06 111/74 mmHg   1/15/2020 11:12 122/92 mmHg   1/14/2020 17:08 120/48 mmHg   1/14/2020 15:46 132/85 mmHg      PMH/PSH reviewed in EPIC today.  ROS: Limited secondary to cognitive impairment but today pt reports the above and 4 point ROS including Respiratory, CV, GI and , other than that noted in the HPI, is negative.    Vitals:  BP (!) 122/92   Pulse 58   Temp 97.7  F (36.5  C)   Resp 18   Wt 52.6 kg (116 lb)   SpO2 93%   BMI 18.17 kg/m     Body mass index is 18.17 kg/m .  Exam:  GENERAL APPEARANCE: Alert, in no distress, cooperative.   ENT: Mouth/posterior oropharynx intact w/ moist mucous membranes, hearing acuity [place].  EYES: EOM, conjunctivae, lids, pupils  and irises normal, PERRL2.   RESP: Respiratory effort good, no respiratory distress, Lung sounds clear. On RA.   CV: Auscultation of heart reveals S1, S2, rate variable and rhythm irregular, 2/6 systolic murmur, no rub or gallop, Edema 0+ BLE. Peripheral pulses are 2+.  ABDOMEN: Normal bowel sounds, soft, non-tender abdomen, and no masses palpated.  SKIN: Inspection/Palpation of skin and subcutaneous tissue baseline w/ fragility. No wounds/rashes noted.   NEURO: CN II-XII at patient's baseline, sensation baseline PPS.  PSYCH: Insight, judgement, and memory are baseline impaired, affect and mood are pleasant.    Lab/Diagnostic data:   Labs done in SNF are in [place] EPIC. Please refer to them using [employer] Everywhere.    ASSESSMENT/PLAN  Chronic combined systolic and diastolic congestive heart failure (H)  Chronic atrial fibrillation  Essential hypertension  Falls frequently  Hospice care patient  Acute-on-chronic. Ongoing. To decrease falls, we will discontinue Imdur (nitroglycerin available PRN should CP return), and discontinue low-dose Lisinopril (as BP allows for this). Will decrease unnecessary medications as able by also starting GDR of Prilosec. Follow-up routinely or as needed.    Orders written by provider at facility and transcribed by : Bubba Diaz  1. Discontinue Lisinopril.  2. Decrease Omeprazole to 20 mg PO every day on M,W,F Dx: GERD  3. Discontinue Imdur    Electronically signed by:  JADA Spence CNP DNP        Sincerely,        JADA Branham CNP

## 2020-01-21 NOTE — LETTER
1/21/2020        RE: Citlalli Villarreal  [facility]  [address] [facility] [address]        [place] [facility]  Chief Complaint   Patient presents with     USP Acute   [place] Medical Record Number:  [id number]  Place of Service where encounter took place:  [facility]  (FGS) [[id number]]  HPI: Citlalli Villarreal  is 74 year old (1945), who is being seen today for a federally mandated E/M visit.  HPI information obtained from: facility chart records, facility staff, patient report, [facility] chart review and Care Everywhere [facility] chart review. Today's concerns are:    Chronic combined systolic and diastolic congestive heart failure (H)  Chronic atrial fibrillation  Essential hypertension  Falls frequently  Hospice care patient  Following up today as patient has had 2 falls in 2 days again. During last visit, we decreased Prilosec, and discontinued both Lisinopril and Imdur. Hospice was trialing Trazodone @ HS and TID Depakote to help w/ resistance during care and anxiety.  Today, Citlalli denies CP, SOB, palpitations, dizziness, fever, or aches/pains, GERD/nausea. Nursing reports she sleeps poorly and is still combative and/or resistive w/ cares. Her pertinent BP trends are noted below:    BPs:  1/21/2020 09:31 121/70 mmHg   1/20/2020 17:54 113/74 mmHg  1/20/2020 12:26 115/67 mmHg  1/19/2020 17:21 132/72 mmHg   1/19/2020 11:18 136/78 mmHg   1/19/2020 09:06 138/78 mmHg   1/18/2020 17:15 117/79 mmHg    PMH/PSH reviewed in EPIC today.  ROS: Limited secondary to cognitive impairment but today pt reports the above and 4 point ROS including Respiratory, CV, GI and , other than that noted in the HPI, is negative.    Vitals:  /70   Pulse 57   Temp 97.7  F (36.5  C)   Resp 16   Wt 52.6 kg (116 lb)   SpO2 95%   BMI 18.17 kg/m     Body mass index is 18.17 kg/m .  Exam:  GENERAL APPEARANCE: Alert, in no distress, cooperative.   ENT: Mouth/posterior  oropharynx intact w/ moist mucous membranes, hearing acuity [place].  EYES: EOM, conjunctivae, lids, pupils and irises normal, PERRL2.   RESP: Respiratory effort good, no respiratory distress, Lung sounds clear. On RA.   CV: Auscultation of heart reveals S1, S2, rate variable and rhythm irregular, 2/6 systolic murmur, no rub or gallop, Edema 0+ BLE. Peripheral pulses are 2+.  ABDOMEN: Normal bowel sounds, soft, non-tender abdomen, and no masses palpated.  SKIN: Inspection/Palpation of skin and subcutaneous tissue baseline w/ fragility. No wounds/rashes noted.   NEURO: CN II-XII at patient's baseline, sensation baseline PPS.  PSYCH: Insight, judgement, and memory are baseline impaired, affect and mood are pleasant.    Lab/Diagnostic data:   Labs done in SNF are in [place] EPIC. Please refer to them using [employer]/Care Everywhere.    ASSESSMENT/PLAN  Chronic combined systolic and diastolic congestive heart failure (H)  Chronic atrial fibrillation  Essential hypertension  Falls frequently  Hospice care patient  Acute-on-chronic. Ongoing. To decrease falls, we will GDR Trazodone. Will decrease unnecessary medications as able by also complete GDR of Prilosec and discontinue. Will discontinue Depatoke and start BID Seroquel instead. If this is helpful, we can also consider PRN dosing at future visit. Follow-up routinely or as needed.    Orders written by provider at facility and transcribed by : Bubba Diaz  1. Discontinue Prilosec.  2. Decrease Trazodone form 25 mg to 12.5 mg PO at bedtime x 5 days then discontinue Dx: Insomnia  3. discontinue Depakote  4. 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 Jennifer Richards, APRN CNP DNP        Sincerely,        Jennifer Richards, JADA CNP

## 2020-01-21 NOTE — PROGRESS NOTES
[place] [facility]  Chief Complaint   Patient presents with     custodial Acute   [place] Medical Record Number:  [id number]  Place of Service where encounter took place:  [facility]  (FGS) [[id number]]  HPI: Citlalli Villarreal  is 74 year old (1945), who is being seen today for a federally mandated E/M visit.  HPI information obtained from: facility chart records, facility staff, patient report, [facility] chart review and Care Everywhere [facility] chart review. Today's concerns are:    Chronic combined systolic and diastolic congestive heart failure (H)  Chronic atrial fibrillation  Essential hypertension  Falls frequently  Hospice care patient  Following up today as patient has had 2 falls in 2 days again. During last visit, we decreased Prilosec, and discontinued both Lisinopril and Imdur. Hospice was trialing Trazodone @ HS and TID Depakote to help w/ resistance during care and anxiety.  Today, Citlalli denies CP, SOB, palpitations, dizziness, fever, or aches/pains, GERD/nausea. Nursing reports she sleeps poorly and is still combative and/or resistive w/ cares. Her pertinent BP trends are noted below:    BPs:  1/21/2020 09:31 121/70 mmHg   1/20/2020 17:54 113/74 mmHg  1/20/2020 12:26 115/67 mmHg  1/19/2020 17:21 132/72 mmHg   1/19/2020 11:18 136/78 mmHg   1/19/2020 09:06 138/78 mmHg   1/18/2020 17:15 117/79 mmHg    PMH/PSH reviewed in EPIC today.  ROS: Limited secondary to cognitive impairment but today pt reports the above and 4 point ROS including Respiratory, CV, GI and , other than that noted in the HPI, is negative.    Vitals:  /70   Pulse 57   Temp 97.7  F (36.5  C)   Resp 16   Wt 52.6 kg (116 lb)   SpO2 95%   BMI 18.17 kg/m    Body mass index is 18.17 kg/m .  Exam:  GENERAL APPEARANCE: Alert, in no distress, cooperative.   ENT: Mouth/posterior oropharynx intact w/ moist mucous membranes, hearing acuity [place].  EYES: EOM, conjunctivae, lids, pupils and irises normal,  PERRL2.   RESP: Respiratory effort good, no respiratory distress, Lung sounds clear. On RA.   CV: Auscultation of heart reveals S1, S2, rate variable and rhythm irregular, 2/6 systolic murmur, no rub or gallop, Edema 0+ BLE. Peripheral pulses are 2+.  ABDOMEN: Normal bowel sounds, soft, non-tender abdomen, and no masses palpated.  SKIN: Inspection/Palpation of skin and subcutaneous tissue baseline w/ fragility. No wounds/rashes noted.   NEURO: CN II-XII at patient's baseline, sensation baseline PPS.  PSYCH: Insight, judgement, and memory are baseline impaired, affect and mood are pleasant.    Lab/Diagnostic data:   Labs done in SNF are in [place] EPIC. Please refer to them using [employer] Everywhere.    ASSESSMENT/PLAN  Chronic combined systolic and diastolic congestive heart failure (H)  Chronic atrial fibrillation  Essential hypertension  Falls frequently  Hospice care patient  Acute-on-chronic. Ongoing. To decrease falls, we will GDR Trazodone. Will decrease unnecessary medications as able by also complete GDR of Prilosec and discontinue. Will discontinue Depatoke and start BID Seroquel instead. If this is helpful, we can also consider PRN dosing at future visit. Follow-up routinely or as needed.    Orders written by provider at facility and transcribed by : Bubba Diaz  1. Discontinue Prilosec.  2. Decrease Trazodone form 25 mg to 12.5 mg PO at bedtime x 5 days then discontinue Dx: Insomnia  3. discontinue Depakote  4. 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 Jennifer Richards, APRN CNP DNP

## 2020-01-31 NOTE — PROGRESS NOTES
[place] [facility]  Chief Complaint   Patient presents with     shelter Regulatory   [place] Medical Record Number:  [id number]  Place of Service where encounter took place:  [facility]  (FGS) [[id number]]    HPI:    Citlalli Villarreal is a 73 year old  (1945), who is being seen today for a federally mandated E/M visit.  HPI information obtained from: facility chart records, facility staff, DNP and [place] Epic chart review.     Today's concerns are:  - RN reports resident is more compliant with care now  - DNP reports many recurrent falls, medications adjusted, started on Seroquel, no falls in the last one week or so.   - Resident seen and examiend, pleasantly confused, irrelevant answers.   ....................................................................................  - Past Medical, social, family histories, medications, and allergies reviewed and updated  - Medications reviewed: in the chart and EHR.   - Case Management:   I have reviewed the care plan and MDS and do agree with the plan. Patient's desire to return to the community is not present.  Information reviewed:  Medications, vital signs, orders, and nursing notes.    MEDICATIONS:  Current Outpatient Medications   Medication Sig Dispense Refill     acetaminophen (TYLENOL) 325 MG tablet Take 2 tablets (650 mg) by mouth 3 times daily       aspirin (ASA) 81 MG tablet Take 1 tablet (81 mg) by mouth daily  OTC     carvedilol (COREG) 3.125 MG tablet Take 1 tablet (3.125 mg) by mouth 2 times daily (with meals)       morphine 5 MG solu-tab Take 2.5 mg by mouth every 2 hours as needed for shortness of breath / dyspnea or moderate to severe pain       QUEtiapine (SEROQUEL) 50 MG tablet Take 12.5 mg by mouth 2 times daily       sertraline (ZOLOFT) 25 MG tablet Take 75 mg by mouth daily        ROS: Limited secondary to cognitive impairment but today pt reports- see HPI    Exam:  Vitals: BP (!) 186/112   Pulse 116   Temp 97.6   F (36.4  C)   Resp 18   Wt 52.6 kg (116 lb)   SpO2 94%   BMI 18.17 kg/m    BMI= Body mass index is 18.17 kg/m .  GENERAL APPEARANCE:  Alert, in no distress  RESP:  Lungs clear to auscultation , no adding sound  CV:  S1S2 normal,  no edema, irregular and rapid HR . no murmur.   ABDOMEN:  normal bowel sounds, soft, nontender, no palpable masses  M/S:   Gait and station abnormal uses WC.   SKIN: no rash noted  NEURO:   Hand : 4/5 R, 5/5 L.   no purposeful movement in upper and lower extremities  PSYCH: mood and affect appropriate.  pleasantly confused, irrelevant answers.     Lab/Diagnostic data: Reviewed in the chart and EHR.      ASSESSMENT/PLAN  --------------------------------    Dementia with behavioral disturbance (H)  Paranoia  (H)  - on Seroquel 12.5 mg bid scheduled, with good response.   - on zoloft 75 mg. Continue.   - Continue to anticipate needs. Chronic condition, ongoing decline expected.   -  Continue to provide redirection and reassurance as needed. Maintain safe living situation with goals focused on comfort.    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  Atrial fibrillation, chronic  Carotid stenosis, bilateral  Hypertensive Heart Disease (H)  - had embolic stroke with A-fib with RVR (March 2019). Repeated CT brain with multiple old infarcts. Tariq risk for bleeding and stroke, started on Eliquis.   - Rt was taken off amiodarone in the past for failing to convert to sinus,  and digoxin (query amyloidosis). However, had recurrent A-fib with RVR required hospitalization x2, developed bradycardia hence metoprolol and diltiazem stopped and started on Digoxin (April 2019). Recently with elevated BP, small dose of lisinopril added.   - due to ongoing recurrent falls, taken off Eliquis. Given Resident now is enrolled in hospice, we do not recommend re-starting Eliquis. Also, recommends taking Resident off ASA as  well given no survival benefit given life expectancy is less than 6 months.     Hx of Wrist fracture, right, closed,   Hx of Closed fracture of distal end of right radius with routine healing, unspecified fracture morphology,   - analgesia at  Baseline.      Hx of Gastrointestinal hemorrhage: at baseline. Off omeprazole now, no concern.        Hospice Care:   - hospitalized in November for NSTEMI and A-fib RVR.   - Appreciate collaboration with  hospice team for symptom management in collaboration with cares for maximum comfort at end-of-life      Order: see above recommendations, otherwise no new order and continue current management plane.       Electronically signed by:  Pasquale Johnson MD

## 2020-02-03 NOTE — LETTER
2/3/2020        RE: Citlalli Villarreal  [facility]  [address] [facility] [address]        [place] [facility]  Chief Complaint   Patient presents with     senior living Regulatory   [place] Medical Record Number:  [id number]  Place of Service where encounter took place:  [facility]  (FGS) [[id number]]    HPI:    Citlalli Villarreal is a 73 year old  (1945), who is being seen today for a federally mandated E/M visit.  HPI information obtained from: facility chart records, facility staff, DNP and [facility] chart review.     Today's concerns are:  - RN reports resident is more compliant with care now  - DNP reports many recurrent falls, medications adjusted, started on Seroquel, no falls in the last one week or so.   - Resident seen and examiend, pleasantly confused, irrelevant answers.   ....................................................................................  - Past Medical, social, family histories, medications, and allergies reviewed and updated  - Medications reviewed: in the chart and EHR.   - Case Management:   I have reviewed the care plan and MDS and do agree with the plan. Patient's desire to return to the community is not present.  Information reviewed:  Medications, vital signs, orders, and nursing notes.    MEDICATIONS:  Current Outpatient Medications   Medication Sig Dispense Refill     acetaminophen (TYLENOL) 325 MG tablet Take 2 tablets (650 mg) by mouth 3 times daily       aspirin (ASA) 81 MG tablet Take 1 tablet (81 mg) by mouth daily  OTC     carvedilol (COREG) 3.125 MG tablet Take 1 tablet (3.125 mg) by mouth 2 times daily (with meals)       morphine 5 MG solu-tab Take 2.5 mg by mouth every 2 hours as needed for shortness of breath / dyspnea or moderate to severe pain       QUEtiapine (SEROQUEL) 50 MG tablet Take 12.5 mg by mouth 2 times daily       sertraline (ZOLOFT) 25 MG tablet Take 75 mg by mouth daily        ROS: Limited secondary  to cognitive impairment but today pt reports- see HPI    Exam:  Vitals: BP (!) 186/112   Pulse 116   Temp 97.6  F (36.4  C)   Resp 18   Wt 52.6 kg (116 lb)   SpO2 94%   BMI 18.17 kg/m     BMI= Body mass index is 18.17 kg/m .  GENERAL APPEARANCE:  Alert, in no distress  RESP:  Lungs clear to auscultation  , no adding sound  CV:  S1S2 normal,  no edema, irregular and rapid HR . no murmur.   ABDOMEN:  normal bowel sounds, soft, nontender, no palpable masses  M/S:   Gait and station abnormal uses WC.   SKIN: no rash noted  NEURO:   Hand : 4/5 R, 5/5 L.   no purposeful movement in upper and lower extremities  PSYCH: mood and affect appropriate.   pleasantly confused, irrelevant answers.     Lab/Diagnostic data: Reviewed in the chart and EHR.      ASSESSMENT/PLAN  --------------------------------    Dementia with behavioral disturbance (H)  Paranoia  (H)  - on Seroquel 12.5 mg bid scheduled, with good response.   - on zoloft 75 mg. Continue.   - Continue to anticipate needs. Chronic condition, ongoing decline expected.   -  Continue to provide redirection and reassurance as needed. Maintain safe living situation with goals focused on comfort.    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  Atrial fibrillation, chronic  Carotid stenosis, bilateral  Hypertensive Heart Disease (H)  - had embolic stroke with A-fib with RVR (March 2019). Repeated CT brain with multiple old infarcts. Tariq risk for bleeding and stroke, started on Eliquis.   - Rt was taken off amiodarone in the past for failing to convert to sinus,  and digoxin (query amyloidosis). However, had recurrent A-fib with RVR required hospitalization x2, developed bradycardia hence metoprolol and diltiazem stopped and started on Digoxin (April 2019). Recently with elevated BP, small dose of lisinopril added.   - due to ongoing recurrent falls, taken off Eliquis. Given Resident  now is enrolled in hospice, we do not recommend re-starting Eliquis. Also, recommends taking Resident off ASA as well given no survival benefit given life expectancy is less than 6 months.     Hx of Wrist fracture, right, closed,   Hx of Closed fracture of distal end of right radius with routine healing, unspecified fracture morphology,   - analgesia at  Baseline.      Hx of Gastrointestinal hemorrhage: at baseline. Off omeprazole now, no concern.        Hospice Care:   - hospitalized in November for NSTEMI and A-fib RVR.   - Appreciate collaboration with  hospice team for symptom management in collaboration with cares for maximum comfort at end-of-life      Order: see above recommendations, otherwise no new order and continue current management plane.       Electronically signed by:  Pasquale Johnson MD          Sincerely,        Pasquale Johnson MD

## 2020-02-05 NOTE — LETTER
2/5/2020        RE: Citlalli Villarreal  [facility]  [address] [facility] [address]        [place] [facility]  Chief Complaint   Patient presents with     group home Regulatory   [place] Medical Record Number:  [id number]  Place of Service where encounter took place:  [facility]  ([facility]GS) [[id number]]. HPI: Citlalli Villarreal  is 74 year old (1945), who is being seen today for a federally mandated E/M visit.  HPI information obtained from: facility chart records, facility staff, patient report, [facility] chart review and Care Everywhere [facility] chart review. Today's concerns are:    Abdominal aortic aneurysm (AAA) without rupture (H)  Chronic combined systolic and diastolic congestive heart failure (H)  NSTEMI (non-ST elevated myocardial infarction) (H)  Persistent atrial fibrillation  Dementia without behavioral disturbance, unspecified dementia type (H)  Paranoia (H)  Moderate protein-calorie malnutrition (H)  Falls frequently  Hospice care patient  During last few visits, we have discontinued non-essential medications and of note, we stopped anticoagulation secondary to repeat falls and hematuria. Patient was falling daily, sometimes multiple times/day and refusing help. We started Seroquel.    Today, nursing states patient has not fallen for almost 2 weeks, is gracious and thanking staff. She is still resistive w/ cares, but w/ coaxing allows nursing staff to help her w/ hygiene. She still will cuss at staff and accuse them of various things, but is more easily redirected. Hospice believes Seroquel start has been very positive. Patient denies SOB, CP, nausea, fever, constipation/diarrhea.  She remains on Coreg for rate-control, along w/ baby aspirin given NSTEMI last fall and stop of anticoagulation. Her VS are grossly stable.     ALLERGIES:Patient has no known allergies. PAST MEDICAL HISTORY:   has a past medical history of Atrial fibrillation (H), CVA (cerebral  "vascular accident) (H), Dementia (H), Gastroesophageal reflux disease, History of embolic stroke with hemorrhagic conversion 10/15/2018. (3/10/2019), Hyperlipemia, Hypertension, Major depressive disorder, Occlusion and stenosis of bilateral carotid arteries, Repeated falls, and Right wrist fracture (10/2018). PAST SURGICAL HISTORY:   has a past surgical history that includes Forearm surgery (Right, 10/2018). FAMILY HISTORY: family history includes No Known Problems in her father and mother. SOCIAL HISTORY:  reports that she has quit smoking. She smoked 0.00 packs per day. She has never used smokeless tobacco. She reports that she does not drink alcohol or use drugs.  MEDICATIONS:  Medication Sig     acetaminophen 325 MG tablet Take 2 tablets (650 mg) by mouth 3 times daily     aspirin (ASA) 81 MG tablet Take 1 tablet (81 mg) by mouth daily     carvedilol (COREG) 3.125 MG tablet Take 1 tablet (3.125 mg) by mouth 2 times daily (with meals)     morphine 5 MG solu-tab Take 2.5 mg by mouth every 2 hours as needed for shortness of breath / dyspnea or moderate to severe pain     SEROQUEL 50 MG tablet Take 12.5 mg by mouth 2 times daily     sertraline (ZOLOFT) 25 MG tablet Take 75 mg by mouth daily      Case Management: I have reviewed the care plan and MDS and do agree with the plan. Patient's desire to return to the community is not assessible due to cognitive impairment. Information reviewed:  Medications, vital signs, orders, and nursing notes.    ROS: Limited secondary to cognitive impairment but today pt reports the above and 4 point ROS including Respiratory, CV, GI and , other than that noted in the HPI, is negative.    Vitals:  BP (!) 140/108   Pulse 65   Temp 97.6  F (36.4  C)   Resp 16   Ht 1.702 m (5' 7\")   Wt 52.6 kg (116 lb)   SpO2 94%   BMI 18.17 kg/m     Body mass index is 18.17 kg/m .  Exam:  GENERAL APPEARANCE: Alert, in no distress, cooperative.   ENT: Mouth/posterior oropharynx intact w/ moist " mucous membranes, hearing acuity [place].  EYES: EOM, conjunctivae, lids, pupils and irises normal, PERRL2.   RESP: Respiratory effort good, no respiratory distress, Lung sounds clear. On RA.   CV: Auscultation of heart reveals S1, S2, rate-controlled and rhythm irregular, 2/6 systolic murmur, no rub or gallop, Edema 0+ BLE. Peripheral pulses are 2+.  ABDOMEN: Normal bowel sounds, soft, non-tender abdomen, and no masses palpated.  SKIN: Inspection/Palpation of skin and subcutaneous tissue baseline w/ fragility. No wounds/rashes noted.   NEURO: CN II-XII at patient's baseline, sensation baseline PPS.  PSYCH: Insight, judgement, and memory are impaired at baseline, affect and mood are flat/paranoid.    Lab/Diagnostic data:   Labs done in SNF are in [place] EPIC. Please refer to them using [employer]/Care Everywhere.    ASSESSMENT/PLAN  Abdominal aortic aneurysm (AAA) without rupture (H)  Chronic combined systolic and diastolic congestive heart failure (H)  NSTEMI (non-ST elevated myocardial infarction) (H)  Persistent atrial fibrillation  Dementia without behavioral disturbance, unspecified dementia type (H)  Paranoia (H)  Moderate protein-calorie malnutrition (H)  Falls frequently  Hospice care patient  Chronic. Ongoing/Progressive. Seroquel start appears to be appropriate and helpful in comfort and prevention of falls. We will NOT increase as lowest dose appears effective. May consider restart of anticoagulation at a later date if patient is consistently NOT falling and if MPOA is agreeable. Will follow-up routinely or as needed.    Orders written by provider at facility and transcribed by : Bubba Diaz  1. No new orders at this time.    Electronically signed by:  JADA Spence CNP [facility]        Sincerely,        JADA Branham CNP

## 2020-02-05 NOTE — PROGRESS NOTES
[place] [facility]  Chief Complaint   Patient presents with     jail Regulatory   [place] Medical Record Number:  [id number]  Place of Service where encounter took place:  [facility]  ([facility]GS) [[id number]]. HPI: Citlalli Villarreal  is 74 year old (1945), who is being seen today for a federally mandated E/M visit.  HPI information obtained from: facility chart records, facility staff, patient report, [place] Epic chart review and Care Everywhere Epic chart review. Today's concerns are:    Abdominal aortic aneurysm (AAA) without rupture (H)  Chronic combined systolic and diastolic congestive heart failure (H)  NSTEMI (non-ST elevated myocardial infarction) (H)  Persistent atrial fibrillation  Dementia without behavioral disturbance, unspecified dementia type (H)  Paranoia (H)  Moderate protein-calorie malnutrition (H)  Falls frequently  Hospice care patient  During last few visits, we have discontinued non-essential medications and of note, we stopped anticoagulation secondary to repeat falls and hematuria. Patient was falling daily, sometimes multiple times/day and refusing help. We started Seroquel.    Today, nursing states patient has not fallen for almost 2 weeks, is gracious and thanking staff. She is still resistive w/ cares, but w/ coaxing allows nursing staff to help her w/ hygiene. She still will cuss at staff and accuse them of various things, but is more easily redirected. Hospice believes Seroquel start has been very positive. Patient denies SOB, CP, nausea, fever, constipation/diarrhea.  She remains on Coreg for rate-control, along w/ baby aspirin given NSTEMI last fall and stop of anticoagulation. Her VS are grossly stable.     ALLERGIES:Patient has no known allergies. PAST MEDICAL HISTORY:   has a past medical history of Atrial fibrillation (H), CVA (cerebral vascular accident) (H), Dementia (H), Gastroesophageal reflux disease, History of embolic stroke with hemorrhagic  "conversion 10/15/2018. (3/10/2019), Hyperlipemia, Hypertension, Major depressive disorder, Occlusion and stenosis of bilateral carotid arteries, Repeated falls, and Right wrist fracture (10/2018). PAST SURGICAL HISTORY:   has a past surgical history that includes Forearm surgery (Right, 10/2018). FAMILY HISTORY: family history includes No Known Problems in her father and mother. SOCIAL HISTORY:  reports that she has quit smoking. She smoked 0.00 packs per day. She has never used smokeless tobacco. She reports that she does not drink alcohol or use drugs.  MEDICATIONS:  Medication Sig     acetaminophen 325 MG tablet Take 2 tablets (650 mg) by mouth 3 times daily     aspirin (ASA) 81 MG tablet Take 1 tablet (81 mg) by mouth daily     carvedilol (COREG) 3.125 MG tablet Take 1 tablet (3.125 mg) by mouth 2 times daily (with meals)     morphine 5 MG solu-tab Take 2.5 mg by mouth every 2 hours as needed for shortness of breath / dyspnea or moderate to severe pain     SEROQUEL 50 MG tablet Take 12.5 mg by mouth 2 times daily     sertraline (ZOLOFT) 25 MG tablet Take 75 mg by mouth daily      Case Management: I have reviewed the care plan and MDS and do agree with the plan. Patient's desire to return to the community is not assessible due to cognitive impairment. Information reviewed:  Medications, vital signs, orders, and nursing notes.    ROS: Limited secondary to cognitive impairment but today pt reports the above and 4 point ROS including Respiratory, CV, GI and , other than that noted in the HPI, is negative.    Vitals:  BP (!) 140/108   Pulse 65   Temp 97.6  F (36.4  C)   Resp 16   Ht 1.702 m (5' 7\")   Wt 52.6 kg (116 lb)   SpO2 94%   BMI 18.17 kg/m    Body mass index is 18.17 kg/m .  Exam:  GENERAL APPEARANCE: Alert, in no distress, cooperative.   ENT: Mouth/posterior oropharynx intact w/ moist mucous membranes, hearing acuity [place].  EYES: EOM, conjunctivae, lids, pupils and irises normal, PERRL2.   RESP: " Respiratory effort good, no respiratory distress, Lung sounds clear. On RA.   CV: Auscultation of heart reveals S1, S2, rate-controlled and rhythm irregular, 2/6 systolic murmur, no rub or gallop, Edema 0+ BLE. Peripheral pulses are 2+.  ABDOMEN: Normal bowel sounds, soft, non-tender abdomen, and no masses palpated.  SKIN: Inspection/Palpation of skin and subcutaneous tissue baseline w/ fragility. No wounds/rashes noted.   NEURO: CN II-XII at patient's baseline, sensation baseline PPS.  PSYCH: Insight, judgement, and memory are impaired at baseline, affect and mood are flat/paranoid.    Lab/Diagnostic data:   Labs done in SNF are in [facility]. Please refer to them using [employer] Everywhere.    ASSESSMENT/PLAN  Abdominal aortic aneurysm (AAA) without rupture (H)  Chronic combined systolic and diastolic congestive heart failure (H)  NSTEMI (non-ST elevated myocardial infarction) (H)  Persistent atrial fibrillation  Dementia without behavioral disturbance, unspecified dementia type (H)  Paranoia (H)  Moderate protein-calorie malnutrition (H)  Falls frequently  Hospice care patient  Chronic. Ongoing/Progressive. Seroquel start appears to be appropriate and helpful in comfort and prevention of falls. We will NOT increase as lowest dose appears effective. May consider restart of anticoagulation at a later date if patient is consistently NOT falling and if MPOA is agreeable. Will follow-up routinely or as needed.    Orders written by provider at facility and transcribed by : Bubba Diaz  1. No new orders at this time.    Electronically signed by:  Dr. Jennifer Richards, APRN CNP DNP

## 2020-02-06 PROBLEM — I71.40 ABDOMINAL AORTIC ANEURYSM (AAA) WITHOUT RUPTURE (H): Status: ACTIVE | Noted: 2020-01-01

## 2020-02-06 PROBLEM — E46 PROTEIN-CALORIE MALNUTRITION (H): Status: ACTIVE | Noted: 2020-01-01

## 2020-02-25 PROBLEM — I48.91 ATRIAL FIBRILLATION WITH RVR (H): Status: RESOLVED | Noted: 2019-03-06 | Resolved: 2020-01-01

## 2020-02-25 PROBLEM — Z79.01 LONG TERM CURRENT USE OF ANTICOAGULANT THERAPY: Status: RESOLVED | Noted: 2019-01-01 | Resolved: 2020-01-01

## 2020-02-25 PROBLEM — T79.6XXA TRAUMATIC RHABDOMYOLYSIS, INITIAL ENCOUNTER (H): Status: RESOLVED | Noted: 2018-10-15 | Resolved: 2020-01-01

## 2020-02-25 PROBLEM — D72.823 LEUKEMOID REACTION: Status: RESOLVED | Noted: 2019-01-01 | Resolved: 2020-01-01

## 2020-02-25 PROBLEM — N17.9 ACUTE RENAL INJURY DUE TO HYPOVOLEMIA (H): Status: RESOLVED | Noted: 2019-01-01 | Resolved: 2020-01-01

## 2020-02-25 PROBLEM — K59.04 CHRONIC IDIOPATHIC CONSTIPATION: Status: RESOLVED | Noted: 2018-12-13 | Resolved: 2020-01-01

## 2020-02-25 PROBLEM — I48.91 RAPID ATRIAL FIBRILLATION (H): Status: RESOLVED | Noted: 2019-01-01 | Resolved: 2020-01-01

## 2020-02-25 PROBLEM — G93.5 COMPRESSION OF BRAIN (H): Status: RESOLVED | Noted: 2018-10-15 | Resolved: 2020-01-01

## 2020-02-25 PROBLEM — E86.1 ACUTE RENAL INJURY DUE TO HYPOVOLEMIA (H): Status: RESOLVED | Noted: 2019-01-01 | Resolved: 2020-01-01

## 2020-02-25 PROBLEM — I48.91 A-FIB (H): Status: RESOLVED | Noted: 2019-01-01 | Resolved: 2020-01-01

## 2020-02-25 PROBLEM — S52.501A FRACTURE OF RIGHT DISTAL RADIUS: Status: RESOLVED | Noted: 2018-10-14 | Resolved: 2020-01-01

## 2020-02-25 PROBLEM — I62.9 INTRACRANIAL BLEED (H): Status: RESOLVED | Noted: 2018-10-14 | Resolved: 2020-01-01

## 2020-02-25 PROBLEM — K21.00 GASTROESOPHAGEAL REFLUX DISEASE WITH ESOPHAGITIS: Status: RESOLVED | Noted: 2019-01-01 | Resolved: 2020-01-01

## 2020-02-25 NOTE — PROGRESS NOTES
[place] G[facility]AT[facility]IC SERVICES  Chief Complaint   Patient presents with     retirement Acute   [place] Medical Record Number:  [id number]. Place of Service where encounter took place:  [facility]  (FGS) [[id number]]. HPI: Citlalli Villarreal  is 74 year old (1945), who is being seen today for a federally mandated E/M visit.  HPI information obtained from: facility chart records, facility staff, patient report, [facility] chart review and Care Everywhere [facility] chart review. Today's concerns are:    Abdominal aortic aneurysm (AAA) without rupture (H)  Chronic combined systolic and diastolic congestive heart failure (H)  NSTEMI (non-ST elevated myocardial infarction) (H)  Persistent atrial fibrillation  Dementia without behavioral disturbance, unspecified dementia type (H)  Paranoia (H)  Moderate protein-calorie malnutrition (H)  Falls frequently  Hospice care patient  Several months ago, s/p NSTEMI and in the midst of numerous falls and hematuria, Citlalli's anticoagulation for afib was discontinued in concert w/ court-appointed conservator and hospice services, as risk outweighed benefit. Since that time, our team has worked diligently to increase comfort, decrease impulsivity, and provide for safety.  Patient seen today, after nursing's report of overnight CP, SOB, and anxiety. 2 doses of nitroglycerin were administered, along w/ her PRN morphine. Her episode subsided.  We note on chart review that she has not had a fall in 1 month.    We acknowledge her CHADsVasc of 5, and her ongoing afib (rate-controlled). She remains on Aspirin 81mg. While she has not fallen, we note the risks associated w/ Xarelto or other anticoagulation, especially given her AAA and comfort focus. Today, Citlalli denies SOB, CP, nausea, pain. Her VS are grossly stable and hospice has been informed of her overnight event.     PMH/PSH reviewed in EPIC today.  ROS: Limited secondary to cognitive impairment but today pt reports the  above and 4 point ROS including Respiratory, CV, GI and , other than that noted in the HPI, is negative.    Vitals:  /84   Pulse 67   Temp 97.5  F (36.4  C)   Resp 20   Wt 52.8 kg (116 lb 6.4 oz)   SpO2 93%   BMI 18.23 kg/m    Body mass index is 18.23 kg/m .  Exam:  GENERAL APPEARANCE: Alert, in no distress, cooperative.   ENT: Mouth/posterior oropharynx intact w/ moist mucous membranes, hearing acuity [place].  EYES: EOM, conjunctivae, lids, pupils and irises normal, PERRL2.   RESP: Respiratory effort good, no respiratory distress, Lung sounds clear. On RA.   CV: Auscultation of heart reveals S1, S2, rate-controlled and rhythm irregular, 2/6 systolic murmur, no rub or gallop, Edema 0+ BLE. Peripheral pulses are 2+.  ABDOMEN: Normal bowel sounds, soft, non-tender abdomen, and no masses palpated.  SKIN: Inspection/Palpation of skin and subcutaneous tissue baseline w/ fragility. No wounds/rashes noted.   NEURO: CN II-XII at patient's baseline, sensation baseline PPS.  PSYCH: Insight, judgement, and memory are impaired at baseline, affect and mood are flat/paranoid.    Lab/Diagnostic data:   Labs done in SNF are in [place] EPIC. Please refer to them using [employer]/Care Everywhere.    ASSESSMENT/PLAN  Abdominal aortic aneurysm (AAA) without rupture (H)  Chronic combined systolic and diastolic congestive heart failure (H)  NSTEMI (non-ST elevated myocardial infarction) (H)  Persistent atrial fibrillation  Dementia without behavioral disturbance, unspecified dementia type (H)  Paranoia (H)  Moderate protein-calorie malnutrition (H)  Falls frequently  Hospice care patient  Acute-on-Chronic. Ongoing/Progressive. Patient has not fallen, and CHADsVasc places her at moderate risk for a future event, therefore we revisited need for anticoagulation today in concert w/ consulting hospice provider Clifford Gutierres, and we agreed that the risk of anticoagulation still outweighs the benefit for her. Her acute event last night  appears to have resolved w/ PRNs and nursing support. Will follow-up routinely or as needed.    Orders written by provider at facility and transcribed by : Bubba Diaz  1. No new orders at this time.    Electronically signed by:  Dr. Jennifer Richards, JADA CNP DNP

## 2020-02-25 NOTE — LETTER
2/25/2020        RE: Citlalli Villarreal  [facility]  [address] [facility] [address]        [place] GERIATRIC SERVICES  Chief Complaint   Patient presents with     [facility] Acute   [place] Medical Record Number:  [id number]. Place of Service where encounter took place:  [facility]  (FGS) [[id number]]. HPI: Citlalli Villarreal  is 74 year old (1945), who is being seen today for a federally mandated E/M visit.  HPI information obtained from: facility chart records, facility staff, patient report, [facility] chart review and Care Everywhere [facility] chart review. Today's concerns are:    Abdominal aortic aneurysm (AAA) without rupture (H)  Chronic combined systolic and diastolic congestive heart failure (H)  NSTEMI (non-ST elevated myocardial infarction) (H)  Persistent atrial fibrillation  Dementia without behavioral disturbance, unspecified dementia type (H)  Paranoia (H)  Moderate protein-calorie malnutrition (H)  Falls frequently  Hospice care patient  Several months ago, s/p NSTEMI and in the midst of numerous falls and hematuria, Citlalli's anticoagulation for afib was discontinued in concert w/ court-appointed conservator and hospice services, as risk outweighed benefit. Since that time, our team has worked diligently to increase comfort, decrease impulsivity, and provide for safety.  Patient seen today, after nursing's report of overnight CP, SOB, and anxiety. 2 doses of nitroglycerin were administered, along w/ her PRN morphine. Her episode subsided.  We note on chart review that she has not had a fall in 1 month.    We acknowledge her CHADsVasc of 5, and her ongoing afib (rate-controlled). She remains on Aspirin 81mg. While she has not fallen, we note the risks associated w/ Xarelto or other anticoagulation, especially given her AAA and comfort focus. Today, Citlalli denies SOB, CP, nausea, pain. Her VS are grossly stable and hospice has been informed of her overnight  event.     PMH/PSH reviewed in EPIC today.  ROS: Limited secondary to cognitive impairment but today pt reports the above and 4 point ROS including Respiratory, CV, GI and , other than that noted in the HPI, is negative.    Vitals:  /84   Pulse 67   Temp 97.5  F (36.4  C)   Resp 20   Wt 52.8 kg (116 lb 6.4 oz)   SpO2 93%   BMI 18.23 kg/m     Body mass index is 18.23 kg/m .  Exam:  GENERAL APPEARANCE: Alert, in no distress, cooperative.   ENT: Mouth/posterior oropharynx intact w/ moist mucous membranes, hearing acuity [place].  EYES: EOM, conjunctivae, lids, pupils and irises normal, PERRL2.   RESP: Respiratory effort good, no respiratory distress, Lung sounds clear. On RA.   CV: Auscultation of heart reveals S1, S2, rate-controlled and rhythm irregular, 2/6 systolic murmur, no rub or gallop, Edema 0+ BLE. Peripheral pulses are 2+.  ABDOMEN: Normal bowel sounds, soft, non-tender abdomen, and no masses palpated.  SKIN: Inspection/Palpation of skin and subcutaneous tissue baseline w/ fragility. No wounds/rashes noted.   NEURO: CN II-XII at patient's baseline, sensation baseline PPS.  PSYCH: Insight, judgement, and memory are impaired at baseline, affect and mood are flat/paranoid.    Lab/Diagnostic data:   Labs done in SNF are in [facility]. Please refer to them using [employer]/Care Everywhere.    ASSESSMENT/PLAN  Abdominal aortic aneurysm (AAA) without rupture (H)  Chronic combined systolic and diastolic congestive heart failure (H)  NSTEMI (non-ST elevated myocardial infarction) (H)  Persistent atrial fibrillation  Dementia without behavioral disturbance, unspecified dementia type (H)  Paranoia (H)  Moderate protein-calorie malnutrition (H)  Falls frequently  Hospice care patient  Acute-on-Chronic. Ongoing/Progressive. Patient has not fallen, and CHADsVasc places her at moderate risk for a future event, therefore we revisited need for anticoagulation today in concert w/ consulting hospice provider Clifford  Nenita, and we agreed that the risk of anticoagulation still outweighs the benefit for her. Her acute event last night appears to have resolved w/ PRNs and nursing support. Will follow-up routinely or as needed.    Orders written by provider at facility and transcribed by : Bubba Diaz  1. No new orders at this time.    Electronically signed by:  JADA Spence CNP DNP      Sincerely,        JADA Branham CNP

## 2021-06-02 VITALS — WEIGHT: 133.5 LBS | HEIGHT: 67 IN | BODY MASS INDEX: 20.95 KG/M2

## 2021-06-02 VITALS — HEIGHT: 67 IN | BODY MASS INDEX: 20.72 KG/M2 | WEIGHT: 132 LBS

## 2021-06-21 NOTE — PROGRESS NOTES
CHART NOTE     DATE OF SERVICE:  10/30/2018     : 1945   72 y.o.     ASSESSMENT :   Near complete resolution of hemorrhage.      PLAN: Start daily baby asa. Head CT in 3-4 weeks and followup with Dr. White, Neurology. D/W Dr. White.      HPI:  Citlalli Villarreal is status post consult by Dr. Keating [id number] for  ischemic stroke with hemorrhagic conversion  No tumor or underlying lesion noted on MRI.  Patient initially presented s/p fall. Sustained R arm fx.  Per Dr. White, Neurology:  Repeat CT head on 10/21/2018 and if hemorrhages stable start baby aspirin.  Continue Zocor 20 mg daily. Repeat CT scan on 2018 and if it shows complete resolution of hemorrhage we can add Plavix 75 mg daily.  Patient was noted to have rapid afib on admission that improved on current regimen of diltiazem and digoxin.  I would recommend outpatient 30-day event monitor after discharge and if it shows paroxysmal afib in 1 month instead of dual antiplatelet therapy noted above, I would recommend Eliquis.     TODAY, patient reports no HA, N/V, no dizziness.  Cognition and memory not quite back to baseline, per pt and son who is here with her today.  Normally ambulates with a walker.  Currently resides at [facility], plan is to dc to assisted living/[facility] independent living. Getting PT/OT and SLP at the care center.      PAST MEDICAL HISTORY, SURGICAL HISTORY, REVIEW OF SYMPTOMS, MEDICATIONS AND ALLERGIES:  Past medical history, surgical history, ROS, medications and allergies reviewed with patient and remain unchanged from previous visit, except as noted in HPI.     No past medical history on file.    Past Surgical History:   Procedure Laterality Date     FOREARM FRACTURE SURGERY Right 10/19/2018    Procedure: OPEN REDUCTION INTERNAL FIXATION, FRACTURE, RADIUS RIGHT;  Surgeon: Yudelka Briseno MD;  Location: [facility];  Service:        Current Outpatient Prescriptions   Medication Sig Dispense Refill      "acetaminophen (TYLENOL) 500 MG tablet Take 500-1,000 mg by mouth every 4 (four) hours as needed for pain. Not to exceed 4000 mg of acetaminophen per 24 hours.        amiodarone (PACERONE) 200 MG tablet Take 200-400 mg by mouth see administration instructions. Take 400 mg daily through 11/21/2018, then take 200 mg daily starting on 11/22/2018.       atorvastatin (LIPITOR) 20 MG tablet Take 20 mg by mouth every evening.       cephalexin (KEFLEX) 250 MG capsule Take 1 capsule (250 mg total) by mouth 4 (four) times a day for 10 days. 20 capsule 0     digoxin (LANOXIN) 125 mcg tablet Take 1 tablet (125 mcg total) by mouth daily with supper. 30 tablet 1     diltiazem (CARDIZEM CD) 240 MG 24 hr capsule Take 1 capsule (240 mg total) by mouth daily. 30 capsule 1     gabapentin (NEURONTIN) 100 MG capsule Take 100 mg by mouth at bedtime. 30 capsule 1     HYDROcodone-acetaminophen (NORCO) 7.5-325 mg per tablet Take 1-2 tablets by mouth every 4 (four) hours as needed for pain. 1 tablet for pain 1-5, 2 tablets for pain 6-10.       levETIRAcetam (KEPPRA) 500 MG tablet Take 1 tablet (500 mg total) by mouth 2 (two) times a day. 2 tablet 0     omeprazole (PRILOSEC) 20 MG capsule Take 1 capsule (20 mg total) by mouth daily before breakfast. 30 capsule 1     vit E acet/gly/dimeth/water (MOITURIZING LOTION TOP) Apply topically at bedtime. Apply to legs and feet.       No current facility-administered medications for this visit.        PHYSICAL EXAM:    /81  Pulse (!) 50  Ht 5' 7\" (1.702 m)  Wt 132 lb (59.9 kg)  SpO2 97%  BMI 20.67 kg/m2      Neurological exam reveals:  Respirations easy, non-labored.   Skin: W/D/I. No rashes, lesions or breaks in integrity.   Recent and remote memory intact, fund of knowledge wnl.    Alert and oriented x3, speech fluent and appropriate.   Comprehension is NOT intact with 2 step crossover command.   Finger nose finger smooth and accurate  Cannot spell WORLD in reverse, nor recite days of week " in reverse  PERRL, EOMI, No nystagmus,   Face symmetric, tongue midline, Uvula midline,  palate rises with phonation   Shoulder shrug equal  LEWIS w/ good strength  RUE is casted  No extremity edema noted.   Muscle Bulk and tone wnl.   Reflexes: No pathological reflexes   Gait and station:not observed, in WC       RADIOGRAPHIC IMAGING: Head CT 10/26/2018: 1.  Evolving areas of subacute ischemic change with interval evolution of blood products associated with hemorrhagic conversion within the infarct. 2.  No new intracranial hemorrhage.       Films personally reviewed and interpreted.  Also reviewed and discussed with Dr. Fairbanks and Dr. White.   Reviewed imaging with patient and family.

## 2021-06-21 NOTE — ANESTHESIA PREPROCEDURE EVALUATION
Anesthesia Evaluation      Patient summary reviewed     Airway   Mallampati: II  Neck ROM: full   Pulmonary    (+) decreased breath sounds,                          Cardiovascular   Exercise tolerance: < 4 METS  (+) dysrhythmias (a-fib with RVR), ,     ECG reviewed  Rhythm: irregular  Rate: abnormal,      PE comment: A-fib, rate of 138,     Neuro/Psych      Comments: Head MRI 10.15.18:    CONCLUSION:  HEAD MRI:   1. There is a fairly sizable zone of restricted diffusion signal change in the left posterior temporal occipital region, the posterior left thalamus, and along the medial left temporal lobe and hippocampal formation compatible with a recent acute to   subacute infarct. Again seen is the superimposed hemorrhage with this temporal occipital infarct. Overall infarct and superimposed hemorrhage stable from CT last night allowing for difference in technique. Mild localized mass effect, stable.  2. Comparing the pre and postcontrast T1 images, there is no definite underlying enhancing mass in this region of the left temporal occipital area.  3. Mild generalized cerebral and cerebellar atrophy with presumed chronic small vessel ischemic change in the cerebral white matter. Small chronic lacunar infarcts along the base of the left cerebellum.          Endo/Other       GI/Hepatic/Renal       Other findings: Intracranial bleed (H)     HLD (hyperlipidemia)    Recurrent falls    Atrial fibrillation with rapid ventricular response (H)    Traumatic rhabdomyolysis, initial encounter (H)    Compression of brain (H)    Wrist fracture, right, closed, initial encounter    Intracranial atherosclerosis    Carotid stenosis, asymptomatic, bilateral    Deny-neglect of right side    Altered mental status, unspecified altered mental status type    Fracture of right distal radius    Cellulitis of left forearm        Patient confused, does not answer questions appropriately.  Per son there is a history of mental illness.    Pt is  alert and answering questions appropriately on the day of surgery.  Oriented.      Dental    (+) edentulous                       Anesthesia Plan  Planned anesthetic: peripheral nerve block  Discussed PNB and GA mask with patient's son.  He consents for her treatment tomorrow should she stabilize from a cardiac standpoint.    Plan for PNB for anesthesia.  Minimal sedation as tolerated.  Pt agrees.  ASA 4   Induction: intravenous   Anesthetic plan and risks discussed with: patient  Anesthesia plan special considerations: antiemetics,   Post-op plan: routine recovery

## 2021-06-21 NOTE — ANESTHESIA CARE TRANSFER NOTE
Last vitals:   Vitals:    10/19/18 1159   BP: 113/89   Pulse: 64   Resp: 16   Temp:    SpO2: 96%     Patient's level of consciousness is drowsy  Spontaneous respirations: yes  Maintains airway independently: yes  Dentition unchanged: yes  Oropharynx: oropharynx clear of all foreign objects    QCDR Measures:  ASA# 20 - Surgical Safety Checklist: WHO surgical safety checklist completed prior to induction  PQRS# 430 - Adult PONV Prevention: 4558F - Pt received => 2 anti-emetic agents (different classes) preop & intraop  ASA# 8 - Peds PONV Prevention: NA - Not pediatric patient, not GA or 2 or more risk factors NOT present  PQRS# 424 - Yadira-op Temp Management: 4559F - At least one body temp DOCUMENTED => 35.5C or 95.9F within required timeframe  PQRS# 426 - PACU Transfer Protocol: - Transfer of care checklist used  ASA# 14 - Acute Post-op Pain: ASA14B - Patient did NOT experience pain >= 7 out of 10

## 2021-06-21 NOTE — ANESTHESIA PROCEDURE NOTES
Peripheral Block    Patient location during procedure: pre-op  Start time: 10/19/2018 8:45 AM  End time: 10/19/2018 8:50 AM  post-op analgesia per surgeon order as noted in medical record  Staffing:  Performing  Anesthesiologist: ANAMARIA CHESTER  Preanesthetic Checklist  Completed: patient identified, site marked, risks, benefits, and alternatives discussed, timeout performed, consent obtained, airway assessed, oxygen available, suction available, emergency drugs available and hand hygiene performed  Peripheral Block  Block type: brachial plexus, infraclavicular  Prep: ChloraPrep  Patient position: supine  Patient monitoring: cardiac monitor, continuous pulse oximetry, heart rate and blood pressure  Laterality: right  Injection technique: ultrasound guided    Ultrasound used to visualize needle placement in proximity to nerve being blocked: yes   Permanent ultrasound image captured for medical record  Sterile gel and probe cover used for ultrasound.    Needle  Needle type: echogenic   Needle gauge: 20G  Needle length: 4 in  no peripheral nerve catheter placed  Assessment  Injection assessment: no difficulty with injection, negative aspiration for heme, no paresthesia on injection and incremental injection

## 2021-06-21 NOTE — ANESTHESIA POSTPROCEDURE EVALUATION
Patient: Citlalli Villarreal  OPEN REDUCTION INTERNAL FIXATION, FRACTURE, RADIUS RIGHT  Anesthesia type: regional    Patient location: ICU  Last vitals:   Vitals:    10/19/18 1159   BP: 113/89   Pulse: 64   Resp: 16   Temp:    SpO2: 96%     Post vital signs: stable  Level of consciousness: awake, alert and oriented  Post-anesthesia pain: pain controlled  Post-anesthesia nausea and vomiting: no  Pulmonary: unassisted, nasal cannula  Cardiovascular: stable and blood pressure at baseline  Hydration: adequate  Anesthetic events: no    QCDR Measures:  ASA# 11 - Yadira-op Cardiac Arrest: ASA11B - Patient did NOT experience unanticipated cardiac arrest  ASA# 12 - Yadira-op Mortality Rate: ASA12B - Patient did NOT die  ASA# 13 - PACU Re-Intubation Rate: NA - No ETT / LMA used for case  ASA# 10 - Composite Anes Safety: ASA10A - No serious adverse event    Additional Notes:

## 2021-06-24 NOTE — TELEPHONE ENCOUNTER
Neurosurgery    Received a call from Dr. Glass, [facility].  Patient there with ischemic stroke. They are wondering about anti coagulation as she had hemorrhagic convergence of ischemic stroke when here. She has been on a Heparin drip for 48 hours.     He imaging shows no evidence of hemorrhage so could anti coagulate from neurosurgery perspective. They should ask Neurology to make recommendations.     Xenia Coffman, NP